# Patient Record
Sex: MALE | Race: OTHER | Employment: OTHER | URBAN - METROPOLITAN AREA
[De-identification: names, ages, dates, MRNs, and addresses within clinical notes are randomized per-mention and may not be internally consistent; named-entity substitution may affect disease eponyms.]

---

## 2024-02-25 ENCOUNTER — LAB REQUISITION (OUTPATIENT)
Dept: LAB | Facility: HOSPITAL | Age: 74
End: 2024-02-25
Payer: MEDICARE

## 2024-02-26 ENCOUNTER — TELEPHONE (OUTPATIENT)
Dept: PRIMARY CARE | Facility: CLINIC | Age: 74
End: 2024-02-26
Payer: MEDICARE

## 2024-02-26 LAB
LABORATORY COMMENT REPORT: NORMAL
PATH REPORT.GROSS SPEC: NORMAL
PATH REPORT.TOTAL CANCER: NORMAL

## 2024-02-26 NOTE — TELEPHONE ENCOUNTER
Patient discharged on 2/24/24. Patient was discharged with all medications and does not need immediate attention of the attending. Patient is scheduled for 2/27/24 for hospital follow up.

## 2024-02-27 ENCOUNTER — OFFICE VISIT (OUTPATIENT)
Dept: PRIMARY CARE | Facility: CLINIC | Age: 74
End: 2024-02-27
Payer: MEDICARE

## 2024-02-27 VITALS
SYSTOLIC BLOOD PRESSURE: 130 MMHG | WEIGHT: 150 LBS | HEART RATE: 85 BPM | HEIGHT: 66 IN | DIASTOLIC BLOOD PRESSURE: 62 MMHG | BODY MASS INDEX: 24.11 KG/M2 | OXYGEN SATURATION: 99 %

## 2024-02-27 DIAGNOSIS — Z79.4 TYPE 2 DIABETES MELLITUS WITH CHRONIC KIDNEY DISEASE, WITH LONG-TERM CURRENT USE OF INSULIN, UNSPECIFIED CKD STAGE (MULTI): ICD-10-CM

## 2024-02-27 DIAGNOSIS — Z76.89 ENCOUNTER FOR SUPPORT AND COORDINATION OF TRANSITION OF CARE: Primary | ICD-10-CM

## 2024-02-27 DIAGNOSIS — I50.42 CHRONIC COMBINED SYSTOLIC AND DIASTOLIC CONGESTIVE HEART FAILURE (MULTI): ICD-10-CM

## 2024-02-27 DIAGNOSIS — E11.22 TYPE 2 DIABETES MELLITUS WITH CHRONIC KIDNEY DISEASE, WITH LONG-TERM CURRENT USE OF INSULIN, UNSPECIFIED CKD STAGE (MULTI): ICD-10-CM

## 2024-02-27 DIAGNOSIS — G47.00 INSOMNIA, UNSPECIFIED TYPE: ICD-10-CM

## 2024-02-27 PROBLEM — N18.30 STAGE 3 CHRONIC KIDNEY DISEASE (MULTI): Status: ACTIVE | Noted: 2017-04-24

## 2024-02-27 PROCEDURE — 1160F RVW MEDS BY RX/DR IN RCRD: CPT | Performed by: STUDENT IN AN ORGANIZED HEALTH CARE EDUCATION/TRAINING PROGRAM

## 2024-02-27 PROCEDURE — 3075F SYST BP GE 130 - 139MM HG: CPT | Performed by: STUDENT IN AN ORGANIZED HEALTH CARE EDUCATION/TRAINING PROGRAM

## 2024-02-27 PROCEDURE — 4010F ACE/ARB THERAPY RXD/TAKEN: CPT | Performed by: STUDENT IN AN ORGANIZED HEALTH CARE EDUCATION/TRAINING PROGRAM

## 2024-02-27 PROCEDURE — 1036F TOBACCO NON-USER: CPT | Performed by: STUDENT IN AN ORGANIZED HEALTH CARE EDUCATION/TRAINING PROGRAM

## 2024-02-27 PROCEDURE — 1159F MED LIST DOCD IN RCRD: CPT | Performed by: STUDENT IN AN ORGANIZED HEALTH CARE EDUCATION/TRAINING PROGRAM

## 2024-02-27 PROCEDURE — 3078F DIAST BP <80 MM HG: CPT | Performed by: STUDENT IN AN ORGANIZED HEALTH CARE EDUCATION/TRAINING PROGRAM

## 2024-02-27 PROCEDURE — 99214 OFFICE O/P EST MOD 30 MIN: CPT | Performed by: STUDENT IN AN ORGANIZED HEALTH CARE EDUCATION/TRAINING PROGRAM

## 2024-02-27 PROCEDURE — G2211 COMPLEX E/M VISIT ADD ON: HCPCS | Performed by: STUDENT IN AN ORGANIZED HEALTH CARE EDUCATION/TRAINING PROGRAM

## 2024-02-27 RX ORDER — ASPIRIN 81 MG/1
81 TABLET ORAL DAILY
COMMUNITY
Start: 2019-09-17

## 2024-02-27 RX ORDER — METOPROLOL SUCCINATE 100 MG/1
100 TABLET, EXTENDED RELEASE ORAL DAILY
COMMUNITY

## 2024-02-27 RX ORDER — INSULIN GLARGINE 100 [IU]/ML
12 INJECTION, SOLUTION SUBCUTANEOUS EVERY MORNING
COMMUNITY
Start: 2023-09-12

## 2024-02-27 RX ORDER — SPIRONOLACTONE 25 MG/1
25 TABLET ORAL DAILY
COMMUNITY
Start: 2024-02-24

## 2024-02-27 RX ORDER — ISOSORBIDE MONONITRATE 60 MG/1
60 TABLET, EXTENDED RELEASE ORAL DAILY
COMMUNITY
Start: 2021-06-24

## 2024-02-27 RX ORDER — LOSARTAN POTASSIUM 25 MG/1
25 TABLET ORAL DAILY
COMMUNITY
End: 2024-04-04 | Stop reason: ALTCHOICE

## 2024-02-27 RX ORDER — ATORVASTATIN CALCIUM 20 MG/1
20 TABLET, FILM COATED ORAL DAILY
COMMUNITY
End: 2024-04-11 | Stop reason: SDUPTHER

## 2024-02-27 RX ORDER — INSULIN ASPART 100 [IU]/ML
INJECTION, SOLUTION INTRAVENOUS; SUBCUTANEOUS
COMMUNITY
End: 2024-02-27 | Stop reason: WASHOUT

## 2024-02-27 RX ORDER — EMPAGLIFLOZIN 10 MG/1
10 TABLET, FILM COATED ORAL DAILY
COMMUNITY
End: 2024-03-12 | Stop reason: WASHOUT

## 2024-02-27 RX ORDER — FUROSEMIDE 20 MG/1
20 TABLET ORAL DAILY
COMMUNITY
Start: 2023-08-08 | End: 2024-03-12 | Stop reason: ALTCHOICE

## 2024-02-27 RX ORDER — INSULIN LISPRO 100 [IU]/ML
15 INJECTION, SOLUTION INTRAVENOUS; SUBCUTANEOUS
Qty: 9 ML | Refills: 11 | Status: SHIPPED | OUTPATIENT
Start: 2024-02-27 | End: 2024-04-04 | Stop reason: SDUPTHER

## 2024-02-27 RX ORDER — CLOPIDOGREL BISULFATE 75 MG/1
75 TABLET ORAL DAILY
COMMUNITY
Start: 2023-11-14

## 2024-02-27 RX ORDER — PANTOPRAZOLE SODIUM 40 MG/1
40 TABLET, DELAYED RELEASE ORAL
COMMUNITY
Start: 2024-01-16

## 2024-02-27 RX ORDER — TORSEMIDE 20 MG/1
40 TABLET ORAL DAILY
COMMUNITY
End: 2024-04-11 | Stop reason: SDUPTHER

## 2024-02-27 RX ORDER — DAPAGLIFLOZIN 5 MG/1
5 TABLET, FILM COATED ORAL DAILY
COMMUNITY
End: 2024-03-12 | Stop reason: SDUPTHER

## 2024-02-27 RX ORDER — ZOLPIDEM TARTRATE 5 MG/1
10 TABLET ORAL
COMMUNITY
Start: 2023-12-07 | End: 2024-03-27 | Stop reason: ENTERED-IN-ERROR

## 2024-02-27 NOTE — PROGRESS NOTES
Subjective   Patient ID: Fredrick De La Paz is a 73 y.o. male who presents for follow up after his recent hospital admission with CHF exacerbation    Preventative Visit to be done next visit   AMW to be done later in the year  Assessment/Plan   Transition of Care  CHF exacerbation/Ischemic cardiomyopathy  DM  CAD s/p recent stenting to LCX, planned for another PCI  CKD  Insomnia    -Patient was educated regarding his medications, he needs to continue Torsemide 40 mg daily and stop lasix.  He was counseled regarding Jardiance/Farxiga, he needs to take one at a time  -Regarding his DM, he is sent on Basalglar 10 units in the morning, Switched novolog to Humalog 15 units with each meal (as his insurance covers humalog but not novolog).  -Follow up with cardiology as  soon as possible for 2nd stage of PCI.   -Regarding his insomnia, a prescription is sent for Belsomra 10 mg nightly. Pt typically on ambien but has run out of rx. Will try AVERY and see effect.     OARRS Report Reviewed and appropriate.  UDS reviewed.  I have personally reviewed the OARRS report.  I have considered the risks of abuse, dependence, addiction and diversion. I believe that it is clinically appropriate for this patient to be prescribed this medication based on documented diagnosis.     Controlled Substance Agreement:   I have printed this form and reviewed each line item with the patient and the patient has verbalized understanding.   Date of the last Controlled Substance Agreement: 2/27/24  Date of UDS To be obtained    I discussed patient's OARRS report as well as the potential concern for overdose on prescribed opioid, sleep aid, gabapentin/Lyrica, and/or benzodiazepines. I explained that Overdose Risk Scores >460, require a Narcan prescription and places the patient at risk for potential death.      Patient understands that the risk of death can occur when using opioid, benzodiazepines, gabapentin, Lyrica, sleep aids, and illegal drugs. The risk  of death especially increases when using the above medications and or illegal drugs in combination. I have reviewed with the patient and the patient is in agreement with the terms of the  Controlled Substance Agreement.      At this point in time, patient is in compliance with the above, and has no signs of dependence or addiction to the above prescribed medications.          HPI  A pleasant 73 yr old male, with PMH of DM, HTNm ischemic cardiomyopathy(EF of 35%), CKD(cr on diccharge was 2.6), insomnia, CAD s/p LCX stenting on 2/5, planned for LAD PCI(was supposed to be done last week but deferred given his hypoxia and fever. He has multiple recent admissions with pneumonia/CHF exacerbation, lastly discharged on 2/24. During his last admission thoracentesis was done.  Today: Patient endorses feeling a lot better, as he is able to sleep well, no orthopnea, lower limb swelling has resolved.  He denies nausea, vomiting, chest pain, cough, fever, but endorses occasional dizziness.  He lives by himself, he needs some assistance with ADLs, his ex wife and 18 yr old daughter occasionally help.  Its noted that patient has been takeing Lasix along with Torsemide, Jardiance along with Farxiga, Basalglar 10 units twice daily, he reports hypoglycemia episodes at night where he dropes to 65, he has CGM placed and alerts him, He has lost around 40 Lb recently.  He also reported that he has been on Ambient for insomnia, but has been taking extra pills during his recent sickness and is running out of it.  He was discharged on Jardiance, but he can't afford it    Social hx: Non smoker, denies Etoh drinking, no drugs, he med school lecturer, and is still working online part time      Denies fevers, chills, weight loss, lightheadedness, dizziness, vision changes, sore throat, runny nose, CP, SOB, cough, palpitations, n/v/d, abd pain, black/bloody stools, arthralgias, or new numbness/weakness/tingling in arms/legs/face.        Visit  "Vitals  /62   Pulse 85   Ht 1.676 m (5' 6\")   Wt 68 kg (150 lb)   SpO2 99%   BMI 24.21 kg/m²   Smoking Status Never   BSA 1.78 m²     PHYSICAL EXAM     General Appearance Awake, alert & oriented to time place and person, not in pain, not in distress   HEENT Normocephalic, normal conjunctiva & sclera, No JVD   Heart Normal heart sounds, no murmur, regular rate & rhythm.       Chest Left upper long zone expiratory wheezes, no crackles, equal breath sounds   Abdomen Soft, no tenderness, no guarding, no rigidity       Extremities No swelling, no pitting edema, no asymmetry, no tenderness   Skin No rash.   Neurology No focal neurological deficits     REVIEW OF SYSTEMS     ROS in HPI   No Known Allergies    Current Outpatient Medications   Medication Sig Dispense Refill    aspirin 81 mg EC tablet Take 1 tablet (81 mg) by mouth once daily.      atorvastatin (Lipitor) 20 mg tablet Take 1 tablet (20 mg) by mouth once daily.      Basaglar KwikPen U-100 Insulin 100 unit/mL (3 mL) pen Inject 10 Units under the skin once daily in the morning.      clopidogrel (Plavix) 75 mg tablet Take 1 tablet (75 mg) by mouth once daily.      dapagliflozin propanediol (Farxiga) 5 mg Take 1 tablet (5 mg) by mouth once daily.      furosemide (Lasix) 20 mg tablet Take 1 tablet (20 mg) by mouth once daily.      isosorbide mononitrate ER (Imdur) 60 mg 24 hr tablet Take 1 tablet (60 mg) by mouth once daily.      Jardiance 10 mg Take 1 tablet (10 mg) by mouth once daily.      losartan (Cozaar) 25 mg tablet Take 1 tablet (25 mg) by mouth once daily.      metoprolol succinate XL (Toprol-XL) 100 mg 24 hr tablet Take 1 tablet (100 mg) by mouth once daily.      pantoprazole (ProtoNix) 40 mg EC tablet Take 1 tablet (40 mg) by mouth once daily in the morning. Take before meals.      spironolactone (Aldactone) 25 mg tablet 1 tablet (25 mg).      torsemide (Demadex) 20 mg tablet Take 1 tablet (20 mg) by mouth 2 times a day.      zolpidem (Ambien) 5 mg " tablet Take 2 tablets (10 mg) by mouth.      insulin lispro (HumaLOG U-100 Insulin) 100 unit/mL injection Inject 0.15 mL (15 Units) under the skin 2 times a day with meals. Take as directed per insulin instructions. 9 mL 11    suvorexant (Belsomra) 10 mg tablet Take 1 tablet (10 mg) by mouth as needed at bedtime for sleep for up to 14 days. 14 tablet 0     No current facility-administered medications for this visit.       Objective     Lab Requisition on 02/22/2024   Component Date Value Ref Range Status    Case Report 02/22/2024    Final                    Value:Medical Cytology Report                           Case: [id number]                                  Authorizing Provider:  Maxwell Cunningham, Collected:           02/22/2024 1200                                     MD                                                                           Ordering Location:     [facility]       Received:            02/25/2024 1852                                     Center                                                                       Specimen:    PLEURAL FLUID RIGHT SIDE                                                                   Gross Description 02/22/2024    Final                    Value:This result contains rich text formatting which cannot be displayed here.    Specimen Processing Detail 02/22/2024    Final                    Value:This result contains rich text formatting which cannot be displayed here.       Radiology: Reviewed imaging in powerchart.  No results found.    No family history on file.  Social History     Socioeconomic History    Marital status:      Spouse name: None    Number of children: None    Years of education: None    Highest education level: None   Occupational History    None   Tobacco Use    Smoking status: Never    Smokeless tobacco: Never   Substance and Sexual Activity    Alcohol use: Not Currently    Drug use: None    Sexual activity: None   Other  Topics Concern    None   Social History Narrative    None     Social Determinants of Health     Financial Resource Strain: Not on file   Food Insecurity: Not on file   Transportation Needs: Not on file   Physical Activity: Not on file   Stress: Not on file   Social Connections: Not on file   Intimate Partner Violence: Not on file   Housing Stability: Not on file     Past Medical History:   Diagnosis Date    Acute cystitis with hematuria 12/31/2016    Acute cystitis with hematuria    Personal history of other diseases of the circulatory system     History of hypertension    Personal history of other diseases of the circulatory system     History of cardiac disorder    Personal history of other endocrine, nutritional and metabolic disease     History of diabetic neuropathy    Personal history of other endocrine, nutritional and metabolic disease     History of hyperlipidemia    Personal history of other endocrine, nutritional and metabolic disease     History of type 2 diabetes mellitus    Personal history of other specified conditions     History of insomnia     Past Surgical History:   Procedure Laterality Date    OTHER SURGICAL HISTORY  09/17/2019    Bypass    OTHER SURGICAL HISTORY  09/17/2019    Arterial stent placement       Charting was completed using voice recognition technology and may include unintended errors.

## 2024-02-27 NOTE — PROGRESS NOTES
"Subjective   Patient ID: Fredrick De La Paz is a 73 y.o. male who presents for the following    Assessment/Plan       HPI      Visit Vitals  /62   Pulse 85   Ht 1.676 m (5' 6\")   Wt 68 kg (150 lb)   SpO2 99%   BMI 24.21 kg/m²   Smoking Status Never   BSA 1.78 m²     PHYSICAL EXAM       REVIEW OF SYSTEMS       No Known Allergies    Current Outpatient Medications   Medication Sig Dispense Refill   • aspirin 81 mg EC tablet Take 1 tablet (81 mg) by mouth once daily.     • atorvastatin (Lipitor) 20 mg tablet Take 1 tablet (20 mg) by mouth once daily.     • Basaglar KwikPen U-100 Insulin 100 unit/mL (3 mL) pen Inject 15 Units under the skin once daily in the morning.     • clopidogrel (Plavix) 75 mg tablet Take 1 tablet (75 mg) by mouth once daily.     • dapagliflozin propanediol (Farxiga) 5 mg Take 1 tablet (5 mg) by mouth once daily.     • furosemide (Lasix) 20 mg tablet Take 1 tablet (20 mg) by mouth once daily.     • isosorbide mononitrate ER (Imdur) 60 mg 24 hr tablet Take 1 tablet (60 mg) by mouth once daily.     • Jardiance 10 mg Take 1 tablet (10 mg) by mouth once daily.     • losartan (Cozaar) 25 mg tablet Take 1 tablet (25 mg) by mouth once daily.     • metoprolol succinate XL (Toprol-XL) 100 mg 24 hr tablet Take 1 tablet (100 mg) by mouth once daily.     • pantoprazole (ProtoNix) 40 mg EC tablet Take 1 tablet (40 mg) by mouth once daily in the morning. Take before meals.     • spironolactone (Aldactone) 25 mg tablet 1 tablet (25 mg).     • torsemide (Demadex) 20 mg tablet Take 1 tablet (20 mg) by mouth 2 times a day.     • zolpidem (Ambien) 5 mg tablet Take 2 tablets (10 mg) by mouth.       No current facility-administered medications for this visit.       Objective     Lab Requisition on 02/22/2024   Component Date Value Ref Range Status   • Case Report 02/22/2024    Final                    Value:Medical Cytology Report                           Case: [id number]                                  Authorizing " Provider:  Maxwell Cunningham, Collected:           02/22/2024 1200                                     MD                                                                           Ordering Location:     [facility]       Received:            02/25/2024 1852                                     Center                                                                       Specimen:    PLEURAL FLUID RIGHT SIDE                                                                  • Gross Description 02/22/2024    Final                    Value:This result contains rich text formatting which cannot be displayed here.   • Specimen Processing Detail 02/22/2024    Final                    Value:This result contains rich text formatting which cannot be displayed here.       Radiology: Reviewed imaging in powerchart.  No results found.    No family history on file.  Social History     Socioeconomic History   • Marital status:      Spouse name: None   • Number of children: None   • Years of education: None   • Highest education level: None   Occupational History   • None   Tobacco Use   • Smoking status: Never   • Smokeless tobacco: Never   Substance and Sexual Activity   • Alcohol use: Not Currently   • Drug use: None   • Sexual activity: None   Other Topics Concern   • None   Social History Narrative   • None     Social Determinants of Health     Financial Resource Strain: Not on file   Food Insecurity: Not on file   Transportation Needs: Not on file   Physical Activity: Not on file   Stress: Not on file   Social Connections: Not on file   Intimate Partner Violence: Not on file   Housing Stability: Not on file     Past Medical History:   Diagnosis Date   • Acute cystitis with hematuria 12/31/2016    Acute cystitis with hematuria   • Personal history of other diseases of the circulatory system     History of hypertension   • Personal history of other diseases of the circulatory system     History of cardiac  disorder   • Personal history of other endocrine, nutritional and metabolic disease     History of diabetic neuropathy   • Personal history of other endocrine, nutritional and metabolic disease     History of hyperlipidemia   • Personal history of other endocrine, nutritional and metabolic disease     History of type 2 diabetes mellitus   • Personal history of other specified conditions     History of insomnia     Past Surgical History:   Procedure Laterality Date   • OTHER SURGICAL HISTORY  09/17/2019    Bypass   • OTHER SURGICAL HISTORY  09/17/2019    Arterial stent placement       Charting was completed using voice recognition technology and may include unintended errors.

## 2024-03-07 ENCOUNTER — TELEPHONE (OUTPATIENT)
Dept: PRIMARY CARE | Facility: CLINIC | Age: 74
End: 2024-03-07
Payer: MEDICARE

## 2024-03-07 NOTE — TELEPHONE ENCOUNTER
PATIENT CALLED AND WANTS  TO KNOW THAT HIS PULSE OX DROPPED TO 77 LAST NIGHT  HE SAID HE USUALLY RUNS AT 88

## 2024-03-07 NOTE — TELEPHONE ENCOUNTER
Talked to pt and his oxygen came back and is at 95. Told him if it doesn't come back up and stays in the 70's to go to the er

## 2024-03-12 ENCOUNTER — OFFICE VISIT (OUTPATIENT)
Dept: PRIMARY CARE | Facility: CLINIC | Age: 74
End: 2024-03-12
Payer: MEDICARE

## 2024-03-12 VITALS
HEART RATE: 95 BPM | DIASTOLIC BLOOD PRESSURE: 82 MMHG | SYSTOLIC BLOOD PRESSURE: 154 MMHG | HEIGHT: 66 IN | BODY MASS INDEX: 24.11 KG/M2 | WEIGHT: 150 LBS | OXYGEN SATURATION: 99 %

## 2024-03-12 DIAGNOSIS — Z00.00 MEDICARE ANNUAL WELLNESS VISIT, SUBSEQUENT: Primary | ICD-10-CM

## 2024-03-12 DIAGNOSIS — I25.810 ATHEROSCLEROSIS OF CORONARY ARTERY BYPASS GRAFT OF NATIVE HEART WITHOUT ANGINA PECTORIS: ICD-10-CM

## 2024-03-12 DIAGNOSIS — E11.22 TYPE 2 DIABETES MELLITUS WITH CHRONIC KIDNEY DISEASE, WITH LONG-TERM CURRENT USE OF INSULIN, UNSPECIFIED CKD STAGE (MULTI): ICD-10-CM

## 2024-03-12 DIAGNOSIS — N18.30 STAGE 3 CHRONIC KIDNEY DISEASE, UNSPECIFIED WHETHER STAGE 3A OR 3B CKD (MULTI): ICD-10-CM

## 2024-03-12 DIAGNOSIS — I50.42 CHRONIC COMBINED SYSTOLIC AND DIASTOLIC CONGESTIVE HEART FAILURE (MULTI): ICD-10-CM

## 2024-03-12 DIAGNOSIS — Z79.4 TYPE 2 DIABETES MELLITUS WITH CHRONIC KIDNEY DISEASE, WITH LONG-TERM CURRENT USE OF INSULIN, UNSPECIFIED CKD STAGE (MULTI): ICD-10-CM

## 2024-03-12 PROCEDURE — 1170F FXNL STATUS ASSESSED: CPT | Performed by: STUDENT IN AN ORGANIZED HEALTH CARE EDUCATION/TRAINING PROGRAM

## 2024-03-12 PROCEDURE — 3077F SYST BP >= 140 MM HG: CPT | Performed by: STUDENT IN AN ORGANIZED HEALTH CARE EDUCATION/TRAINING PROGRAM

## 2024-03-12 PROCEDURE — G0439 PPPS, SUBSEQ VISIT: HCPCS | Performed by: STUDENT IN AN ORGANIZED HEALTH CARE EDUCATION/TRAINING PROGRAM

## 2024-03-12 PROCEDURE — 4010F ACE/ARB THERAPY RXD/TAKEN: CPT | Performed by: STUDENT IN AN ORGANIZED HEALTH CARE EDUCATION/TRAINING PROGRAM

## 2024-03-12 PROCEDURE — 99213 OFFICE O/P EST LOW 20 MIN: CPT | Performed by: STUDENT IN AN ORGANIZED HEALTH CARE EDUCATION/TRAINING PROGRAM

## 2024-03-12 PROCEDURE — 1159F MED LIST DOCD IN RCRD: CPT | Performed by: STUDENT IN AN ORGANIZED HEALTH CARE EDUCATION/TRAINING PROGRAM

## 2024-03-12 PROCEDURE — 3079F DIAST BP 80-89 MM HG: CPT | Performed by: STUDENT IN AN ORGANIZED HEALTH CARE EDUCATION/TRAINING PROGRAM

## 2024-03-12 PROCEDURE — G0442 ANNUAL ALCOHOL SCREEN 15 MIN: HCPCS | Performed by: STUDENT IN AN ORGANIZED HEALTH CARE EDUCATION/TRAINING PROGRAM

## 2024-03-12 PROCEDURE — 1036F TOBACCO NON-USER: CPT | Performed by: STUDENT IN AN ORGANIZED HEALTH CARE EDUCATION/TRAINING PROGRAM

## 2024-03-12 PROCEDURE — 1160F RVW MEDS BY RX/DR IN RCRD: CPT | Performed by: STUDENT IN AN ORGANIZED HEALTH CARE EDUCATION/TRAINING PROGRAM

## 2024-03-12 RX ORDER — AMLODIPINE BESYLATE 10 MG/1
10 TABLET ORAL DAILY
COMMUNITY
Start: 2023-12-07 | End: 2024-03-27 | Stop reason: ENTERED-IN-ERROR

## 2024-03-12 RX ORDER — BISMUTH SUBSALICYLATE 262 MG
1 TABLET,CHEWABLE ORAL DAILY
COMMUNITY

## 2024-03-12 ASSESSMENT — PATIENT HEALTH QUESTIONNAIRE - PHQ9
1. LITTLE INTEREST OR PLEASURE IN DOING THINGS: NOT AT ALL
SUM OF ALL RESPONSES TO PHQ9 QUESTIONS 1 AND 2: 0
2. FEELING DOWN, DEPRESSED OR HOPELESS: NOT AT ALL

## 2024-03-12 ASSESSMENT — ENCOUNTER SYMPTOMS
OCCASIONAL FEELINGS OF UNSTEADINESS: 0
DEPRESSION: 0
LOSS OF SENSATION IN FEET: 0

## 2024-03-12 ASSESSMENT — ACTIVITIES OF DAILY LIVING (ADL)
DRESSING: INDEPENDENT
GROCERY_SHOPPING: INDEPENDENT
DOING_HOUSEWORK: INDEPENDENT
BATHING: INDEPENDENT
MANAGING_FINANCES: INDEPENDENT
TAKING_MEDICATION: INDEPENDENT

## 2024-03-12 NOTE — PROGRESS NOTES
Subjective   Patient ID: Fredrick De La Paz is a 73 y.o. male who presents for follow up after his recent hospital admission with CHF exacerbation    AMW: 3/12/24; see chart   Annual Physical to be done in 3 months     Assessment/Plan   Preventative Medicine  -UTD on vaccines   -Had colonoscopy done recently per patient. Results to be brought  -PSA checked by previous PCP. States that it was normal but has a hx of BPH.   -Does not want labs today as he recently had labs done by his cardiology team.   -PHQ2: 0  -Alcohol screening done    CHF  CAD  T2DM  CKD-III  Insomnia   HTN  -BP improving. Has not taken BP meds yet today.   -PT still taking both jardiance and farxiga. Further stressed to only take Jardiance (Cheaper than farxiga). Patient is agreeable and will stop.   -Has cardiac cath scheduled this week  -Still has intermittent SOB and hypoxia which resolves with rest.   -Patient was educated regarding his medications, he needs to continue Torsemide 40 mg daily  -Regarding his DM, he is on Basalglar 10 units in the morning, Humalog 15 units with each meal (as his insurance covers humalog but not novolog).  -Ambien refilled by outside provider on 3/10. Belsomra discontinued.      OARRS Report Reviewed and appropriate.  UDS reviewed.  I have personally reviewed the OARRS report.  I have considered the risks of abuse, dependence, addiction and diversion. I believe that it is clinically appropriate for this patient to be prescribed this medication based on documented diagnosis.     Controlled Substance Agreement:   I have printed this form and reviewed each line item with the patient and the patient has verbalized understanding.   Date of the last Controlled Substance Agreement: 2/27/24  Date of UDS To be obtained    I discussed patient's OARRS report as well as the potential concern for overdose on prescribed opioid, sleep aid, gabapentin/Lyrica, and/or benzodiazepines. I explained that Overdose Risk Scores >460,  "require a Narcan prescription and places the patient at risk for potential death.      Patient understands that the risk of death can occur when using opioid, benzodiazepines, gabapentin, Lyrica, sleep aids, and illegal drugs. The risk of death especially increases when using the above medications and or illegal drugs in combination. I have reviewed with the patient and the patient is in agreement with the terms of the  Controlled Substance Agreement.      At this point in time, patient is in compliance with the above, and has no signs of dependence or addiction to the above prescribed medications.          HPI  73M presents for follow up visit.     PMH of DM, HTNm ischemic cardiomyopathy(EF of 35%), CKD(cr on discharge was 2.6), insomnia, CAD s/p LCX stenting on 2/5, planned for LAD PCI(was supposed to be done last week but deferred given his hypoxia and fever. He has multiple recent admissions with pneumonia/CHF exacerbation, lastly discharged on 2/24. During his last admission thoracentesis was done.    Currently doing well. SOB is minimal but he still has exacerbations with associated hypoxia into the 80s that corrects quickly. He is scheduled for cardiac cath this week and is hoping this will help with symptoms. Denies any CP, dizziness, syncope.     Counseling given in regards to medications. Patient continues to take jardiance and farxiga. Advised to stop one. He is agreeable.     No other complaints at this time.     Social hx: Non smoker, denies Etoh drinking, no drugs, he med school lecturer, and is still working online part time      Denies fevers, chills, weight loss, lightheadedness, dizziness, vision changes, sore throat, runny nose, CP, cough, palpitations, n/v/d, abd pain, black/bloody stools, arthralgias, or new numbness/weakness/tingling in arms/legs/face.        Visit Vitals  /82   Pulse 95   Ht 1.676 m (5' 6\")   Wt 68 kg (150 lb)   SpO2 99%   BMI 24.21 kg/m²   Smoking Status Never   BSA " 1.78 m²     PHYSICAL EXAM     General Appearance Awake, alert & oriented to time place and person, not in pain, not in distress   HEENT Normocephalic, normal conjunctiva & sclera, No JVD   Heart Normal heart sounds, no murmur, regular rate & rhythm.       Chest Left upper long zone expiratory wheezes, no crackles, equal breath sounds   Abdomen Soft, no tenderness, no guarding, no rigidity       Extremities No swelling, no pitting edema, no asymmetry, no tenderness   Skin No rash.   Neurology No focal neurological deficits     REVIEW OF SYSTEMS     ROS in HPI   No Known Allergies    Current Outpatient Medications   Medication Sig Dispense Refill    amLODIPine (Norvasc) 10 mg tablet Take 1 tablet (10 mg) by mouth once daily.      aspirin 81 mg EC tablet Take 1 tablet (81 mg) by mouth once daily.      atorvastatin (Lipitor) 20 mg tablet Take 1 tablet (20 mg) by mouth once daily.      Basaglar KwikPen U-100 Insulin 100 unit/mL (3 mL) pen Inject 10 Units under the skin once daily in the morning.      clopidogrel (Plavix) 75 mg tablet Take 1 tablet (75 mg) by mouth once daily.      dapagliflozin propanediol (Farxiga) 5 mg Take 1 tablet (5 mg) by mouth once daily.      insulin lispro (HumaLOG U-100 Insulin) 100 unit/mL injection Inject 0.15 mL (15 Units) under the skin 2 times a day with meals. Take as directed per insulin instructions. 9 mL 11    isosorbide mononitrate ER (Imdur) 60 mg 24 hr tablet Take 1 tablet (60 mg) by mouth once daily.      Jardiance 10 mg Take 1 tablet (10 mg) by mouth once daily.      losartan (Cozaar) 25 mg tablet Take 1 tablet (25 mg) by mouth once daily.      metoprolol succinate XL (Toprol-XL) 100 mg 24 hr tablet Take 1 tablet (100 mg) by mouth once daily.      multivitamin tablet Take 1 tablet by mouth once daily.      pantoprazole (ProtoNix) 40 mg EC tablet Take 1 tablet (40 mg) by mouth once daily in the morning. Take before meals.      spironolactone (Aldactone) 25 mg tablet 1 tablet (25  mg).      torsemide (Demadex) 20 mg tablet Take 1 tablet (20 mg) by mouth 2 times a day.      furosemide (Lasix) 20 mg tablet Take 1 tablet (20 mg) by mouth once daily.      suvorexant (Belsomra) 10 mg tablet Take 1 tablet (10 mg) by mouth as needed at bedtime for sleep for up to 14 days. 14 tablet 0    zolpidem (Ambien) 5 mg tablet Take 2 tablets (10 mg) by mouth.       No current facility-administered medications for this visit.       Objective     Lab Requisition on 02/22/2024   Component Date Value Ref Range Status    Case Report 02/22/2024    Final                    Value:Medical Cytology Report                           Case: [id number]                                  Authorizing Provider:  Maxwell Cunningham, Collected:           02/22/2024 1200                                     MD                                                                           Ordering Location:     [facility]       Received:            02/25/2024 1852                                     Center                                                                       Specimen:    PLEURAL FLUID RIGHT SIDE                                                                   Gross Description 02/22/2024    Final                    Value:This result contains rich text formatting which cannot be displayed here.    Specimen Processing Detail 02/22/2024    Final                    Value:This result contains rich text formatting which cannot be displayed here.       Radiology: Reviewed imaging in powerchart.  No results found.    No family history on file.  Social History     Socioeconomic History    Marital status:      Spouse name: None    Number of children: None    Years of education: None    Highest education level: None   Occupational History    None   Tobacco Use    Smoking status: Never    Smokeless tobacco: Never   Substance and Sexual Activity    Alcohol use: Not Currently    Drug use: None    Sexual  activity: None   Other Topics Concern    None   Social History Narrative    None     Social Determinants of Health     Financial Resource Strain: Not on file   Food Insecurity: Not on file   Transportation Needs: Not on file   Physical Activity: Not on file   Stress: Not on file   Social Connections: Not on file   Intimate Partner Violence: Not on file   Housing Stability: Not on file     Past Medical History:   Diagnosis Date    Acute cystitis with hematuria 12/31/2016    Acute cystitis with hematuria    Personal history of other diseases of the circulatory system     History of hypertension    Personal history of other diseases of the circulatory system     History of cardiac disorder    Personal history of other endocrine, nutritional and metabolic disease     History of diabetic neuropathy    Personal history of other endocrine, nutritional and metabolic disease     History of hyperlipidemia    Personal history of other endocrine, nutritional and metabolic disease     History of type 2 diabetes mellitus    Personal history of other specified conditions     History of insomnia     Past Surgical History:   Procedure Laterality Date    OTHER SURGICAL HISTORY  09/17/2019    Bypass    OTHER SURGICAL HISTORY  09/17/2019    Arterial stent placement       Charting was completed using voice recognition technology and may include unintended errors.

## 2024-03-20 ENCOUNTER — OFFICE VISIT (OUTPATIENT)
Dept: URGENT CARE | Facility: CLINIC | Age: 74
End: 2024-03-20
Payer: MEDICARE

## 2024-03-20 VITALS
DIASTOLIC BLOOD PRESSURE: 70 MMHG | OXYGEN SATURATION: 96 % | TEMPERATURE: 98.2 F | SYSTOLIC BLOOD PRESSURE: 115 MMHG | HEART RATE: 85 BPM | RESPIRATION RATE: 16 BRPM

## 2024-03-20 DIAGNOSIS — J06.9 VIRAL URI WITH COUGH: Primary | ICD-10-CM

## 2024-03-20 DIAGNOSIS — R50.9 FEVER, UNSPECIFIED FEVER CAUSE: ICD-10-CM

## 2024-03-20 LAB
POC TRIPLEX FLU A-AG: NORMAL
POC TRIPLEX FLU B-AG: NORMAL
POC TRIPLEX SARSCOV-2 AG: NORMAL

## 2024-03-20 PROCEDURE — 1160F RVW MEDS BY RX/DR IN RCRD: CPT | Performed by: PHYSICIAN ASSISTANT

## 2024-03-20 PROCEDURE — 4010F ACE/ARB THERAPY RXD/TAKEN: CPT | Performed by: PHYSICIAN ASSISTANT

## 2024-03-20 PROCEDURE — 1126F AMNT PAIN NOTED NONE PRSNT: CPT | Performed by: PHYSICIAN ASSISTANT

## 2024-03-20 PROCEDURE — 99213 OFFICE O/P EST LOW 20 MIN: CPT | Performed by: PHYSICIAN ASSISTANT

## 2024-03-20 PROCEDURE — 3078F DIAST BP <80 MM HG: CPT | Performed by: PHYSICIAN ASSISTANT

## 2024-03-20 PROCEDURE — 1159F MED LIST DOCD IN RCRD: CPT | Performed by: PHYSICIAN ASSISTANT

## 2024-03-20 PROCEDURE — 3074F SYST BP LT 130 MM HG: CPT | Performed by: PHYSICIAN ASSISTANT

## 2024-03-20 PROCEDURE — 87428 SARSCOV & INF VIR A&B AG IA: CPT | Performed by: PHYSICIAN ASSISTANT

## 2024-03-20 PROCEDURE — 1036F TOBACCO NON-USER: CPT | Performed by: PHYSICIAN ASSISTANT

## 2024-03-20 ASSESSMENT — ENCOUNTER SYMPTOMS
NEUROLOGICAL NEGATIVE: 1
ENDOCRINE NEGATIVE: 1
SHORTNESS OF BREATH: 1
HEMATOLOGIC/LYMPHATIC NEGATIVE: 1
PSYCHIATRIC NEGATIVE: 1
FEVER: 1
ALLERGIC/IMMUNOLOGIC NEGATIVE: 1
EYES NEGATIVE: 1
CARDIOVASCULAR NEGATIVE: 1
MYALGIAS: 1
SORE THROAT: 0
GASTROINTESTINAL NEGATIVE: 1
COUGH: 1

## 2024-03-20 ASSESSMENT — PAIN SCALES - GENERAL: PAINLEVEL: 0-NO PAIN

## 2024-03-20 NOTE — PROGRESS NOTES
Subjective   Patient ID: Fredrick De La Paz is a 73 y.o. male.      History provided by:  Patient   used: No    Fever   Associated symptoms include coughing. Pertinent negatives include no congestion, ear pain or sore throat.   Cough  Associated symptoms include a fever, myalgias and shortness of breath. Pertinent negatives include no ear pain or sore throat.   This is a 73 yr old male here for dry cough, myalgias, feeling feverish and dyspnea x 1 day.No URI sxs, sore throat or ear pain. COVID exposure.    Review of Systems   Constitutional:  Positive for fever.   HENT:  Negative for congestion, ear pain and sore throat.    Eyes: Negative.    Respiratory:  Positive for cough and shortness of breath.    Cardiovascular: Negative.    Gastrointestinal: Negative.    Endocrine: Negative.    Genitourinary: Negative.    Musculoskeletal:  Positive for myalgias.   Allergic/Immunologic: Negative.    Neurological: Negative.    Hematological: Negative.    Psychiatric/Behavioral: Negative.     All other systems reviewed and are negative.  /70 (BP Location: Left arm, Patient Position: Sitting, BP Cuff Size: Large adult)   Pulse 85   Temp 36.8 °C (98.2 °F) (Temporal)   Resp 16   SpO2 96%     Objective   Physical Exam  Vitals and nursing note reviewed.   Constitutional:       Appearance: Normal appearance.   HENT:      Head: Normocephalic and atraumatic.      Right Ear: Tympanic membrane and ear canal normal.      Left Ear: Tympanic membrane and ear canal normal.      Mouth/Throat:      Mouth: Mucous membranes are moist.      Pharynx: Oropharynx is clear.   Cardiovascular:      Rate and Rhythm: Normal rate and regular rhythm.   Pulmonary:      Effort: Pulmonary effort is normal.      Breath sounds: Normal breath sounds.   Musculoskeletal:      Cervical back: Neck supple.   Lymphadenopathy:      Cervical: No cervical adenopathy.   Skin:     General: Skin is warm and dry.   Neurological:      General: No  focal deficit present.      Mental Status: He is alert and oriented to person, place, and time.   Psychiatric:         Mood and Affect: Mood normal.         Behavior: Behavior normal.     Rapid COVID/flu-both negative    Assessment:  Viral URI with cough    Plan:  Sx tx, OTC meds as directed  Pcp follow up this week if not improving or worsening  ER visit anytime 24/7 for acute worsening or changing condition

## 2024-03-20 NOTE — PATIENT INSTRUCTIONS
OTC meds as directed for cough or fever  Fluids and rest  Pcp follow up this week if not improving or worsening  ER visit anytime 24/7 for acute worsening or changing condition

## 2024-03-26 ENCOUNTER — TELEPHONE (OUTPATIENT)
Dept: PRIMARY CARE | Facility: CLINIC | Age: 74
End: 2024-03-26
Payer: MEDICARE

## 2024-03-26 DIAGNOSIS — G47.00 INSOMNIA, UNSPECIFIED TYPE: Primary | ICD-10-CM

## 2024-03-26 RX ORDER — TRAZODONE HYDROCHLORIDE 50 MG/1
50 TABLET ORAL NIGHTLY PRN
Qty: 7 TABLET | Refills: 0 | Status: SHIPPED | OUTPATIENT
Start: 2024-03-26 | End: 2024-04-11

## 2024-03-26 NOTE — TELEPHONE ENCOUNTER
Patient called stated he cannot sleep, he stated Ambien is not helping. Patient requesting a different medication. If he needs to be seen he is aware and I will return call to schedule if needed. Please advise.

## 2024-03-26 NOTE — TELEPHONE ENCOUNTER
Spoke to pt - informed him of message. States he takes melatonin but doesn't help. Agreeable to taking trazodone and for it to be sent to pharmacy.

## 2024-03-26 NOTE — PROGRESS NOTES
Continues to have insomnia on ambien and melatonin. Will rx trazodone 50mg PO at bedtime PRN for sleep x 7 days.

## 2024-03-27 ENCOUNTER — APPOINTMENT (OUTPATIENT)
Dept: RADIOLOGY | Facility: HOSPITAL | Age: 74
DRG: 280 | End: 2024-03-27
Payer: MEDICARE

## 2024-03-27 ENCOUNTER — APPOINTMENT (OUTPATIENT)
Dept: CARDIOLOGY | Facility: HOSPITAL | Age: 74
DRG: 280 | End: 2024-03-27
Payer: MEDICARE

## 2024-03-27 ENCOUNTER — HOSPITAL ENCOUNTER (INPATIENT)
Facility: HOSPITAL | Age: 74
LOS: 3 days | Discharge: HOME | DRG: 280 | End: 2024-03-30
Attending: INTERNAL MEDICINE | Admitting: PHYSICIAN ASSISTANT
Payer: MEDICARE

## 2024-03-27 DIAGNOSIS — I21.4 NSTEMI (NON-ST ELEVATED MYOCARDIAL INFARCTION) (MULTI): ICD-10-CM

## 2024-03-27 DIAGNOSIS — R06.02 SHORTNESS OF BREATH: ICD-10-CM

## 2024-03-27 DIAGNOSIS — R79.89 ELEVATED TROPONIN: ICD-10-CM

## 2024-03-27 DIAGNOSIS — I50.9 ACUTE CONGESTIVE HEART FAILURE, UNSPECIFIED HEART FAILURE TYPE (MULTI): Primary | ICD-10-CM

## 2024-03-27 DIAGNOSIS — R09.02 HYPOXIA: ICD-10-CM

## 2024-03-27 LAB
ABO GROUP (TYPE) IN BLOOD: NORMAL
ALBUMIN SERPL BCP-MCNC: 4.2 G/DL (ref 3.4–5)
ALP SERPL-CCNC: 58 U/L (ref 33–136)
ALT SERPL W P-5'-P-CCNC: 14 U/L (ref 10–52)
ANION GAP SERPL CALC-SCNC: 18 MMOL/L (ref 10–20)
ANTIBODY SCREEN: NORMAL
AORTIC VALVE MEAN GRADIENT: 4 MMHG
AORTIC VALVE PEAK VELOCITY: 1.25 M/S
APPEARANCE UR: CLEAR
APTT PPP: 28 SECONDS (ref 27–38)
AST SERPL W P-5'-P-CCNC: 19 U/L (ref 9–39)
AV PEAK GRADIENT: 6.3 MMHG
AVA (PEAK VEL): 2 CM2
AVA (VTI): 2.11 CM2
BACTERIA #/AREA URNS AUTO: ABNORMAL /HPF
BASOPHILS # BLD AUTO: 0.07 X10*3/UL (ref 0–0.1)
BASOPHILS NFR BLD AUTO: 0.7 %
BILIRUB SERPL-MCNC: 0.7 MG/DL (ref 0–1.2)
BILIRUB UR STRIP.AUTO-MCNC: NEGATIVE MG/DL
BNP SERPL-MCNC: 2456 PG/ML (ref 0–99)
BUN SERPL-MCNC: 36 MG/DL (ref 6–23)
CALCIUM SERPL-MCNC: 9.7 MG/DL (ref 8.6–10.3)
CARDIAC TROPONIN I PNL SERPL HS: 1075 NG/L (ref 0–20)
CARDIAC TROPONIN I PNL SERPL HS: 134 NG/L (ref 0–20)
CARDIAC TROPONIN I PNL SERPL HS: 44 NG/L (ref 0–20)
CHLORIDE SERPL-SCNC: 103 MMOL/L (ref 98–107)
CO2 SERPL-SCNC: 21 MMOL/L (ref 21–32)
COLOR UR: ABNORMAL
CREAT SERPL-MCNC: 2.61 MG/DL (ref 0.5–1.3)
EGFRCR SERPLBLD CKD-EPI 2021: 25 ML/MIN/1.73M*2
EJECTION FRACTION APICAL 4 CHAMBER: 36
EJECTION FRACTION: 34 %
EOSINOPHIL # BLD AUTO: 0.2 X10*3/UL (ref 0–0.4)
EOSINOPHIL NFR BLD AUTO: 2 %
ERYTHROCYTE [DISTWIDTH] IN BLOOD BY AUTOMATED COUNT: 16.6 % (ref 11.5–14.5)
GLUCOSE BLD MANUAL STRIP-MCNC: 169 MG/DL (ref 74–99)
GLUCOSE BLD MANUAL STRIP-MCNC: 173 MG/DL (ref 74–99)
GLUCOSE BLD MANUAL STRIP-MCNC: 184 MG/DL (ref 74–99)
GLUCOSE SERPL-MCNC: 262 MG/DL (ref 74–99)
GLUCOSE UR STRIP.AUTO-MCNC: ABNORMAL MG/DL
HCT VFR BLD AUTO: 40.7 % (ref 41–52)
HGB BLD-MCNC: 12.7 G/DL (ref 13.5–17.5)
HYALINE CASTS #/AREA URNS AUTO: ABNORMAL /LPF
IMM GRANULOCYTES # BLD AUTO: 0.03 X10*3/UL (ref 0–0.5)
IMM GRANULOCYTES NFR BLD AUTO: 0.3 % (ref 0–0.9)
INR PPP: 1.1 (ref 0.9–1.1)
KETONES UR STRIP.AUTO-MCNC: NEGATIVE MG/DL
LEFT ATRIUM VOLUME AREA LENGTH INDEX BSA: 34.2 ML/M2
LEFT VENTRICLE INTERNAL DIMENSION DIASTOLE: 5.08 CM (ref 3.5–6)
LEFT VENTRICULAR OUTFLOW TRACT DIAMETER: 1.8 CM
LEUKOCYTE ESTERASE UR QL STRIP.AUTO: NEGATIVE
LYMPHOCYTES # BLD AUTO: 3.1 X10*3/UL (ref 0.8–3)
LYMPHOCYTES NFR BLD AUTO: 31.2 %
MAGNESIUM SERPL-MCNC: 1.81 MG/DL (ref 1.6–2.4)
MCH RBC QN AUTO: 30 PG (ref 26–34)
MCHC RBC AUTO-ENTMCNC: 31.2 G/DL (ref 32–36)
MCV RBC AUTO: 96 FL (ref 80–100)
MITRAL VALVE E/A RATIO: 1.63
MONOCYTES # BLD AUTO: 0.51 X10*3/UL (ref 0.05–0.8)
MONOCYTES NFR BLD AUTO: 5.1 %
NEUTROPHILS # BLD AUTO: 6.03 X10*3/UL (ref 1.6–5.5)
NEUTROPHILS NFR BLD AUTO: 60.7 %
NITRITE UR QL STRIP.AUTO: NEGATIVE
NRBC BLD-RTO: 0 /100 WBCS (ref 0–0)
PH UR STRIP.AUTO: 5 [PH]
PHOSPHATE SERPL-MCNC: 4.3 MG/DL (ref 2.5–4.9)
PLATELET # BLD AUTO: 246 X10*3/UL (ref 150–450)
POTASSIUM SERPL-SCNC: 4.8 MMOL/L (ref 3.5–5.3)
PROT SERPL-MCNC: 7.8 G/DL (ref 6.4–8.2)
PROT UR STRIP.AUTO-MCNC: ABNORMAL MG/DL
PROTHROMBIN TIME: 12.1 SECONDS (ref 9.8–12.8)
RBC # BLD AUTO: 4.23 X10*6/UL (ref 4.5–5.9)
RBC # UR STRIP.AUTO: ABNORMAL /UL
RBC #/AREA URNS AUTO: ABNORMAL /HPF
RH FACTOR (ANTIGEN D): NORMAL
RIGHT VENTRICLE FREE WALL PEAK S': 9.25 CM/S
RIGHT VENTRICLE PEAK SYSTOLIC PRESSURE: 41.4 MMHG
SODIUM SERPL-SCNC: 137 MMOL/L (ref 136–145)
SP GR UR STRIP.AUTO: 1.01
TRICUSPID ANNULAR PLANE SYSTOLIC EXCURSION: 1 CM
UROBILINOGEN UR STRIP.AUTO-MCNC: <2 MG/DL
WBC # BLD AUTO: 9.9 X10*3/UL (ref 4.4–11.3)
WBC #/AREA URNS AUTO: ABNORMAL /HPF

## 2024-03-27 PROCEDURE — 85025 COMPLETE CBC W/AUTO DIFF WBC: CPT | Performed by: INTERNAL MEDICINE

## 2024-03-27 PROCEDURE — 80053 COMPREHEN METABOLIC PANEL: CPT | Performed by: INTERNAL MEDICINE

## 2024-03-27 PROCEDURE — 2500000005 HC RX 250 GENERAL PHARMACY W/O HCPCS: Performed by: INTERNAL MEDICINE

## 2024-03-27 PROCEDURE — 81001 URINALYSIS AUTO W/SCOPE: CPT | Performed by: PHYSICIAN ASSISTANT

## 2024-03-27 PROCEDURE — 93306 TTE W/DOPPLER COMPLETE: CPT

## 2024-03-27 PROCEDURE — 99291 CRITICAL CARE FIRST HOUR: CPT | Performed by: INTERNAL MEDICINE

## 2024-03-27 PROCEDURE — 94660 CPAP INITIATION&MGMT: CPT

## 2024-03-27 PROCEDURE — 84484 ASSAY OF TROPONIN QUANT: CPT | Performed by: INTERNAL MEDICINE

## 2024-03-27 PROCEDURE — 2500000001 HC RX 250 WO HCPCS SELF ADMINISTERED DRUGS (ALT 637 FOR MEDICARE OP): Performed by: INTERNAL MEDICINE

## 2024-03-27 PROCEDURE — 2500000004 HC RX 250 GENERAL PHARMACY W/ HCPCS (ALT 636 FOR OP/ED): Performed by: INTERNAL MEDICINE

## 2024-03-27 PROCEDURE — 86901 BLOOD TYPING SEROLOGIC RH(D): CPT | Performed by: INTERNAL MEDICINE

## 2024-03-27 PROCEDURE — 71045 X-RAY EXAM CHEST 1 VIEW: CPT | Performed by: RADIOLOGY

## 2024-03-27 PROCEDURE — 93010 ELECTROCARDIOGRAM REPORT: CPT | Performed by: INTERNAL MEDICINE

## 2024-03-27 PROCEDURE — 93005 ELECTROCARDIOGRAM TRACING: CPT

## 2024-03-27 PROCEDURE — 82947 ASSAY GLUCOSE BLOOD QUANT: CPT

## 2024-03-27 PROCEDURE — 96374 THER/PROPH/DIAG INJ IV PUSH: CPT

## 2024-03-27 PROCEDURE — 2060000001 HC INTERMEDIATE ICU ROOM DAILY

## 2024-03-27 PROCEDURE — 2500000004 HC RX 250 GENERAL PHARMACY W/ HCPCS (ALT 636 FOR OP/ED): Performed by: PHYSICIAN ASSISTANT

## 2024-03-27 PROCEDURE — 2500000001 HC RX 250 WO HCPCS SELF ADMINISTERED DRUGS (ALT 637 FOR MEDICARE OP): Performed by: PHYSICIAN ASSISTANT

## 2024-03-27 PROCEDURE — 84100 ASSAY OF PHOSPHORUS: CPT | Performed by: INTERNAL MEDICINE

## 2024-03-27 PROCEDURE — 71045 X-RAY EXAM CHEST 1 VIEW: CPT

## 2024-03-27 PROCEDURE — 2500000002 HC RX 250 W HCPCS SELF ADMINISTERED DRUGS (ALT 637 FOR MEDICARE OP, ALT 636 FOR OP/ED): Performed by: PHYSICIAN ASSISTANT

## 2024-03-27 PROCEDURE — 84484 ASSAY OF TROPONIN QUANT: CPT | Performed by: PHYSICIAN ASSISTANT

## 2024-03-27 PROCEDURE — 83735 ASSAY OF MAGNESIUM: CPT | Performed by: INTERNAL MEDICINE

## 2024-03-27 PROCEDURE — 36415 COLL VENOUS BLD VENIPUNCTURE: CPT | Performed by: INTERNAL MEDICINE

## 2024-03-27 PROCEDURE — 85730 THROMBOPLASTIN TIME PARTIAL: CPT | Performed by: INTERNAL MEDICINE

## 2024-03-27 PROCEDURE — 5A09357 ASSISTANCE WITH RESPIRATORY VENTILATION, LESS THAN 24 CONSECUTIVE HOURS, CONTINUOUS POSITIVE AIRWAY PRESSURE: ICD-10-PCS | Performed by: INTERNAL MEDICINE

## 2024-03-27 PROCEDURE — 85610 PROTHROMBIN TIME: CPT | Performed by: INTERNAL MEDICINE

## 2024-03-27 PROCEDURE — 83880 ASSAY OF NATRIURETIC PEPTIDE: CPT | Performed by: INTERNAL MEDICINE

## 2024-03-27 PROCEDURE — 99223 1ST HOSP IP/OBS HIGH 75: CPT | Performed by: PHYSICIAN ASSISTANT

## 2024-03-27 RX ORDER — LOSARTAN POTASSIUM 25 MG/1
25 TABLET ORAL DAILY
Status: DISCONTINUED | OUTPATIENT
Start: 2024-03-27 | End: 2024-03-30 | Stop reason: HOSPADM

## 2024-03-27 RX ORDER — ISOSORBIDE MONONITRATE 60 MG/1
60 TABLET, EXTENDED RELEASE ORAL DAILY
Status: DISCONTINUED | OUTPATIENT
Start: 2024-03-27 | End: 2024-03-30 | Stop reason: HOSPADM

## 2024-03-27 RX ORDER — ATORVASTATIN CALCIUM 20 MG/1
20 TABLET, FILM COATED ORAL DAILY
Status: DISCONTINUED | OUTPATIENT
Start: 2024-03-27 | End: 2024-03-30 | Stop reason: HOSPADM

## 2024-03-27 RX ORDER — ALLOPURINOL 100 MG/1
100 TABLET ORAL DAILY
Status: DISCONTINUED | OUTPATIENT
Start: 2024-03-27 | End: 2024-03-30 | Stop reason: HOSPADM

## 2024-03-27 RX ORDER — NAPROXEN SODIUM 220 MG/1
324 TABLET, FILM COATED ORAL ONCE
Status: COMPLETED | OUTPATIENT
Start: 2024-03-27 | End: 2024-03-27

## 2024-03-27 RX ORDER — ACETAMINOPHEN 325 MG/1
650 TABLET ORAL EVERY 4 HOURS PRN
Status: DISCONTINUED | OUTPATIENT
Start: 2024-03-27 | End: 2024-03-30 | Stop reason: HOSPADM

## 2024-03-27 RX ORDER — ONDANSETRON 4 MG/1
4 TABLET, FILM COATED ORAL EVERY 6 HOURS PRN
Status: DISCONTINUED | OUTPATIENT
Start: 2024-03-27 | End: 2024-03-30 | Stop reason: HOSPADM

## 2024-03-27 RX ORDER — ZOLPIDEM TARTRATE 5 MG/1
10 TABLET ORAL NIGHTLY
Status: DISCONTINUED | OUTPATIENT
Start: 2024-03-27 | End: 2024-03-30 | Stop reason: HOSPADM

## 2024-03-27 RX ORDER — ONDANSETRON HYDROCHLORIDE 2 MG/ML
4 INJECTION, SOLUTION INTRAVENOUS EVERY 6 HOURS PRN
Status: DISCONTINUED | OUTPATIENT
Start: 2024-03-27 | End: 2024-03-30 | Stop reason: HOSPADM

## 2024-03-27 RX ORDER — ACETAMINOPHEN 160 MG/5ML
650 SOLUTION ORAL EVERY 4 HOURS PRN
Status: DISCONTINUED | OUTPATIENT
Start: 2024-03-27 | End: 2024-03-30 | Stop reason: HOSPADM

## 2024-03-27 RX ORDER — ASPIRIN 81 MG/1
81 TABLET ORAL DAILY
Status: DISCONTINUED | OUTPATIENT
Start: 2024-03-27 | End: 2024-03-27

## 2024-03-27 RX ORDER — ALLOPURINOL 100 MG/1
100 TABLET ORAL DAILY
COMMUNITY
Start: 2023-12-07

## 2024-03-27 RX ORDER — INSULIN GLARGINE 100 [IU]/ML
10 INJECTION, SOLUTION SUBCUTANEOUS EVERY MORNING
Status: DISCONTINUED | OUTPATIENT
Start: 2024-03-27 | End: 2024-03-30 | Stop reason: HOSPADM

## 2024-03-27 RX ORDER — CLOPIDOGREL BISULFATE 75 MG/1
75 TABLET ORAL DAILY
Status: DISCONTINUED | OUTPATIENT
Start: 2024-03-27 | End: 2024-03-30 | Stop reason: HOSPADM

## 2024-03-27 RX ORDER — FUROSEMIDE 10 MG/ML
40 INJECTION INTRAMUSCULAR; INTRAVENOUS ONCE
Status: COMPLETED | OUTPATIENT
Start: 2024-03-27 | End: 2024-03-27

## 2024-03-27 RX ORDER — HEPARIN SODIUM 5000 [USP'U]/ML
INJECTION, SOLUTION INTRAVENOUS; SUBCUTANEOUS EVERY 4 HOURS PRN
Status: DISCONTINUED | OUTPATIENT
Start: 2024-03-27 | End: 2024-03-27

## 2024-03-27 RX ORDER — DEXTROSE 50 % IN WATER (D50W) INTRAVENOUS SYRINGE
25
Status: DISCONTINUED | OUTPATIENT
Start: 2024-03-27 | End: 2024-03-30 | Stop reason: HOSPADM

## 2024-03-27 RX ORDER — ASPIRIN 81 MG/1
81 TABLET ORAL DAILY
Status: DISCONTINUED | OUTPATIENT
Start: 2024-03-28 | End: 2024-03-30 | Stop reason: HOSPADM

## 2024-03-27 RX ORDER — HEPARIN SODIUM 5000 [USP'U]/ML
4000 INJECTION, SOLUTION INTRAVENOUS; SUBCUTANEOUS ONCE
Status: COMPLETED | OUTPATIENT
Start: 2024-03-27 | End: 2024-03-27

## 2024-03-27 RX ORDER — HEPARIN SODIUM 10000 [USP'U]/100ML
0-4000 INJECTION, SOLUTION INTRAVENOUS CONTINUOUS
Status: DISCONTINUED | OUTPATIENT
Start: 2024-03-27 | End: 2024-03-27

## 2024-03-27 RX ORDER — SPIRONOLACTONE 25 MG/1
25 TABLET ORAL DAILY
Status: DISCONTINUED | OUTPATIENT
Start: 2024-03-27 | End: 2024-03-30 | Stop reason: HOSPADM

## 2024-03-27 RX ORDER — METOPROLOL SUCCINATE 50 MG/1
100 TABLET, EXTENDED RELEASE ORAL DAILY
Status: DISCONTINUED | OUTPATIENT
Start: 2024-03-27 | End: 2024-03-30 | Stop reason: HOSPADM

## 2024-03-27 RX ORDER — FUROSEMIDE 10 MG/ML
40 INJECTION INTRAMUSCULAR; INTRAVENOUS DAILY
Status: DISCONTINUED | OUTPATIENT
Start: 2024-03-28 | End: 2024-03-27

## 2024-03-27 RX ORDER — FUROSEMIDE 10 MG/ML
40 INJECTION INTRAMUSCULAR; INTRAVENOUS EVERY 12 HOURS
Status: DISCONTINUED | OUTPATIENT
Start: 2024-03-27 | End: 2024-03-28

## 2024-03-27 RX ORDER — DEXTROSE 50 % IN WATER (D50W) INTRAVENOUS SYRINGE
12.5
Status: DISCONTINUED | OUTPATIENT
Start: 2024-03-27 | End: 2024-03-30 | Stop reason: HOSPADM

## 2024-03-27 RX ORDER — MULTIVIT-MIN/IRON FUM/FOLIC AC 7.5 MG-4
1 TABLET ORAL DAILY
Status: DISCONTINUED | OUTPATIENT
Start: 2024-03-27 | End: 2024-03-30 | Stop reason: HOSPADM

## 2024-03-27 RX ORDER — INSULIN LISPRO 100 [IU]/ML
0-10 INJECTION, SOLUTION INTRAVENOUS; SUBCUTANEOUS
Status: DISCONTINUED | OUTPATIENT
Start: 2024-03-27 | End: 2024-03-30 | Stop reason: HOSPADM

## 2024-03-27 RX ORDER — NITROGLYCERIN 0.4 MG/1
0.4 TABLET SUBLINGUAL EVERY 5 MIN PRN
Status: DISCONTINUED | OUTPATIENT
Start: 2024-03-27 | End: 2024-03-30 | Stop reason: HOSPADM

## 2024-03-27 RX ORDER — HEPARIN SODIUM 5000 [USP'U]/ML
5000 INJECTION, SOLUTION INTRAVENOUS; SUBCUTANEOUS EVERY 8 HOURS SCHEDULED
Status: DISCONTINUED | OUTPATIENT
Start: 2024-03-27 | End: 2024-03-27

## 2024-03-27 RX ORDER — ZOLPIDEM TARTRATE 10 MG/1
10 TABLET ORAL NIGHTLY
COMMUNITY
Start: 2024-03-10 | End: 2024-04-11

## 2024-03-27 RX ORDER — ACETAMINOPHEN 650 MG/1
650 SUPPOSITORY RECTAL EVERY 4 HOURS PRN
Status: DISCONTINUED | OUTPATIENT
Start: 2024-03-27 | End: 2024-03-30 | Stop reason: HOSPADM

## 2024-03-27 RX ORDER — PANTOPRAZOLE SODIUM 40 MG/1
40 TABLET, DELAYED RELEASE ORAL
Status: DISCONTINUED | OUTPATIENT
Start: 2024-03-28 | End: 2024-03-30 | Stop reason: HOSPADM

## 2024-03-27 RX ADMIN — ISOSORBIDE MONONITRATE 60 MG: 60 TABLET, EXTENDED RELEASE ORAL at 12:49

## 2024-03-27 RX ADMIN — PERFLUTREN 2 ML OF DILUTION: 6.52 INJECTION, SUSPENSION INTRAVENOUS at 15:04

## 2024-03-27 RX ADMIN — ZOLPIDEM TARTRATE 10 MG: 5 TABLET, COATED ORAL at 21:12

## 2024-03-27 RX ADMIN — HEPARIN SODIUM 1200 UNITS/HR: 10000 INJECTION, SOLUTION INTRAVENOUS at 13:19

## 2024-03-27 RX ADMIN — INSULIN GLARGINE 10 UNITS: 100 INJECTION, SOLUTION SUBCUTANEOUS at 12:52

## 2024-03-27 RX ADMIN — LOSARTAN POTASSIUM 25 MG: 25 TABLET, FILM COATED ORAL at 12:50

## 2024-03-27 RX ADMIN — ATORVASTATIN CALCIUM 20 MG: 20 TABLET, FILM COATED ORAL at 12:50

## 2024-03-27 RX ADMIN — METOPROLOL SUCCINATE 100 MG: 50 TABLET, EXTENDED RELEASE ORAL at 12:50

## 2024-03-27 RX ADMIN — ASPIRIN 81 MG CHEWABLE TABLET 324 MG: 81 TABLET CHEWABLE at 12:50

## 2024-03-27 RX ADMIN — FUROSEMIDE 40 MG: 10 INJECTION, SOLUTION INTRAMUSCULAR; INTRAVENOUS at 10:21

## 2024-03-27 RX ADMIN — SPIRONOLACTONE 25 MG: 25 TABLET, FILM COATED ORAL at 12:50

## 2024-03-27 RX ADMIN — Medication 1 TABLET: at 12:50

## 2024-03-27 RX ADMIN — INSULIN LISPRO 2 UNITS: 100 INJECTION, SOLUTION INTRAVENOUS; SUBCUTANEOUS at 16:28

## 2024-03-27 RX ADMIN — NITROGLYCERIN 1 INCH: 20 OINTMENT TOPICAL at 09:12

## 2024-03-27 RX ADMIN — HEPARIN SODIUM 4000 UNITS: 5000 INJECTION INTRAVENOUS; SUBCUTANEOUS at 13:15

## 2024-03-27 RX ADMIN — Medication: at 08:18

## 2024-03-27 RX ADMIN — CLOPIDOGREL BISULFATE 75 MG: 75 TABLET ORAL at 12:50

## 2024-03-27 RX ADMIN — FUROSEMIDE 40 MG: 10 INJECTION, SOLUTION INTRAMUSCULAR; INTRAVENOUS at 22:30

## 2024-03-27 RX ADMIN — Medication: at 20:00

## 2024-03-27 SDOH — SOCIAL STABILITY: SOCIAL INSECURITY: DOES ANYONE TRY TO KEEP YOU FROM HAVING/CONTACTING OTHER FRIENDS OR DOING THINGS OUTSIDE YOUR HOME?: NO

## 2024-03-27 SDOH — SOCIAL STABILITY: SOCIAL INSECURITY: DO YOU FEEL ANYONE HAS EXPLOITED OR TAKEN ADVANTAGE OF YOU FINANCIALLY OR OF YOUR PERSONAL PROPERTY?: NO

## 2024-03-27 SDOH — SOCIAL STABILITY: SOCIAL INSECURITY: ARE THERE ANY APPARENT SIGNS OF INJURIES/BEHAVIORS THAT COULD BE RELATED TO ABUSE/NEGLECT?: NO

## 2024-03-27 SDOH — SOCIAL STABILITY: SOCIAL INSECURITY: HAS ANYONE EVER THREATENED TO HURT YOUR FAMILY OR YOUR PETS?: NO

## 2024-03-27 SDOH — SOCIAL STABILITY: SOCIAL INSECURITY: WERE YOU ABLE TO COMPLETE ALL THE BEHAVIORAL HEALTH SCREENINGS?: YES

## 2024-03-27 SDOH — SOCIAL STABILITY: SOCIAL INSECURITY: HAVE YOU HAD THOUGHTS OF HARMING ANYONE ELSE?: NO

## 2024-03-27 SDOH — SOCIAL STABILITY: SOCIAL INSECURITY: ARE YOU OR HAVE YOU BEEN THREATENED OR ABUSED PHYSICALLY, EMOTIONALLY, OR SEXUALLY BY ANYONE?: NO

## 2024-03-27 SDOH — SOCIAL STABILITY: SOCIAL INSECURITY: ABUSE: ADULT

## 2024-03-27 SDOH — SOCIAL STABILITY: SOCIAL INSECURITY: DO YOU FEEL UNSAFE GOING BACK TO THE PLACE WHERE YOU ARE LIVING?: NO

## 2024-03-27 ASSESSMENT — COGNITIVE AND FUNCTIONAL STATUS - GENERAL
STANDING UP FROM CHAIR USING ARMS: A LITTLE
MOVING TO AND FROM BED TO CHAIR: A LITTLE
WALKING IN HOSPITAL ROOM: A LITTLE
DAILY ACTIVITIY SCORE: 23
PATIENT BASELINE BEDBOUND: NO
TOILETING: A LITTLE
MOBILITY SCORE: 20
CLIMB 3 TO 5 STEPS WITH RAILING: A LITTLE
DAILY ACTIVITIY SCORE: 23
TOILETING: A LITTLE
CLIMB 3 TO 5 STEPS WITH RAILING: A LITTLE
WALKING IN HOSPITAL ROOM: A LITTLE
STANDING UP FROM CHAIR USING ARMS: A LITTLE
MOBILITY SCORE: 20
MOVING TO AND FROM BED TO CHAIR: A LITTLE

## 2024-03-27 ASSESSMENT — LIFESTYLE VARIABLES
TOTAL SCORE: 0
SKIP TO QUESTIONS 9-10: 1
HOW MANY STANDARD DRINKS CONTAINING ALCOHOL DO YOU HAVE ON A TYPICAL DAY: PATIENT DOES NOT DRINK
EVER HAD A DRINK FIRST THING IN THE MORNING TO STEADY YOUR NERVES TO GET RID OF A HANGOVER: NO
HOW OFTEN DO YOU HAVE A DRINK CONTAINING ALCOHOL: NEVER
HAVE PEOPLE ANNOYED YOU BY CRITICIZING YOUR DRINKING: NO
SUBSTANCE_ABUSE_PAST_12_MONTHS: NO
AUDIT-C TOTAL SCORE: 0
HOW OFTEN DO YOU HAVE 6 OR MORE DRINKS ON ONE OCCASION: NEVER
EVER FELT BAD OR GUILTY ABOUT YOUR DRINKING: NO
AUDIT-C TOTAL SCORE: 0
HAVE YOU EVER FELT YOU SHOULD CUT DOWN ON YOUR DRINKING: NO
PRESCIPTION_ABUSE_PAST_12_MONTHS: NO

## 2024-03-27 ASSESSMENT — COLUMBIA-SUICIDE SEVERITY RATING SCALE - C-SSRS
6. HAVE YOU EVER DONE ANYTHING, STARTED TO DO ANYTHING, OR PREPARED TO DO ANYTHING TO END YOUR LIFE?: NO
1. IN THE PAST MONTH, HAVE YOU WISHED YOU WERE DEAD OR WISHED YOU COULD GO TO SLEEP AND NOT WAKE UP?: NO
2. HAVE YOU ACTUALLY HAD ANY THOUGHTS OF KILLING YOURSELF?: NO
2. HAVE YOU ACTUALLY HAD ANY THOUGHTS OF KILLING YOURSELF?: NO
1. IN THE PAST MONTH, HAVE YOU WISHED YOU WERE DEAD OR WISHED YOU COULD GO TO SLEEP AND NOT WAKE UP?: NO
6. HAVE YOU EVER DONE ANYTHING, STARTED TO DO ANYTHING, OR PREPARED TO DO ANYTHING TO END YOUR LIFE?: NO

## 2024-03-27 ASSESSMENT — ACTIVITIES OF DAILY LIVING (ADL)
HEARING - LEFT EAR: FUNCTIONAL
HEARING - RIGHT EAR: FUNCTIONAL
GROOMING: INDEPENDENT
DRESSING YOURSELF: INDEPENDENT
ADEQUATE_TO_COMPLETE_ADL: YES
WALKS IN HOME: NEEDS ASSISTANCE
BATHING: INDEPENDENT
LACK_OF_TRANSPORTATION: NO
FEEDING YOURSELF: INDEPENDENT
PATIENT'S MEMORY ADEQUATE TO SAFELY COMPLETE DAILY ACTIVITIES?: YES
TOILETING: INDEPENDENT
JUDGMENT_ADEQUATE_SAFELY_COMPLETE_DAILY_ACTIVITIES: YES

## 2024-03-27 ASSESSMENT — PAIN SCALES - GENERAL
PAINLEVEL_OUTOF10: 0 - NO PAIN

## 2024-03-27 ASSESSMENT — PAIN - FUNCTIONAL ASSESSMENT
PAIN_FUNCTIONAL_ASSESSMENT: 0-10
PAIN_FUNCTIONAL_ASSESSMENT: 0-10

## 2024-03-27 ASSESSMENT — PATIENT HEALTH QUESTIONNAIRE - PHQ9
1. LITTLE INTEREST OR PLEASURE IN DOING THINGS: NOT AT ALL
2. FEELING DOWN, DEPRESSED OR HOPELESS: NOT AT ALL
SUM OF ALL RESPONSES TO PHQ9 QUESTIONS 1 & 2: 0

## 2024-03-27 NOTE — PROGRESS NOTES
Pharmacy Medication History Review    Fredrick De La Paz is a 73 y.o. male admitted for No Principal Problem: There is no principal problem currently on the Problem List. Please update the Problem List and refresh.. Pharmacy reviewed the patient's [id number] medications and allergies for accuracy.    The list below reflectives the updated PTA list. Please review each medication in order reconciliation for additional clarification and justification.  Prior to Admission medications    Medication Sig Start Date End Date Taking? Authorizing Provider   allopurinol (Zyloprim) 100 mg tablet Take 1 tablet (100 mg) by mouth once daily. 12/7/23  Yes Historical Provider, MD   zolpidem (Ambien) 10 mg tablet Take 1 tablet (10 mg) by mouth once daily at bedtime. 3/10/24 4/9/24 Yes Historical Provider, MD   aspirin 81 mg EC tablet Take 1 tablet (81 mg) by mouth once daily. 9/17/19   Historical Provider, MD   atorvastatin (Lipitor) 20 mg tablet Take 1 tablet (20 mg) by mouth once daily.    Historical Provider, MD Cr Garrett U-100 Insulin 100 unit/mL (3 mL) pen Inject 10 Units under the skin once daily in the morning. 9/12/23   Historical Provider, MD   clopidogrel (Plavix) 75 mg tablet Take 1 tablet (75 mg) by mouth once daily. 11/14/23   Historical Provider, MD   empagliflozin (Jardiance) 10 mg Take 1 tablet (10 mg) by mouth once daily. 3/12/24 3/12/25  Nydia De La Paz MD   insulin lispro (HumaLOG U-100 Insulin) 100 unit/mL injection Inject 0.15 mL (15 Units) under the skin 2 times a day with meals. Take as directed per insulin instructions. 2/27/24 2/26/25  Nydia De La Paz MD   isosorbide mononitrate ER (Imdur) 60 mg 24 hr tablet Take 1 tablet (60 mg) by mouth once daily. 6/24/21   Historical Provider, MD   losartan (Cozaar) 25 mg tablet Take 1 tablet (25 mg) by mouth once daily.    Historical Provider, MD   metoprolol succinate XL (Toprol-XL) 100 mg 24 hr tablet Take 1 tablet (100 mg) by mouth once daily.     Historical Provider, MD   multivitamin tablet Take 1 tablet by mouth once daily.    Historical Provider, MD   pantoprazole (ProtoNix) 40 mg EC tablet Take 1 tablet (40 mg) by mouth once daily in the morning. Take before meals. 1/16/24   Historical Provider, MD   spironolactone (Aldactone) 25 mg tablet Take 1 tablet (25 mg) by mouth once daily. 2/24/24   Historical Provider, MD   torsemide (Demadex) 20 mg tablet Take 1 tablet (20 mg) by mouth 2 times a day.    Historical Provider, MD   traZODone (Desyrel) 50 mg tablet Take 1 tablet (50 mg) by mouth as needed at bedtime for sleep for up to 7 days. NEW - did not start yet 3/26/24 4/2/24  Nydia De La Paz MD        The list below reflectives the updated allergy list. Please review each documented allergy for additional clarification and justification.  Allergies  Reviewed by Tomeka Ely RN on 3/27/2024   No Known Allergies         Below are additional concerns with the patient's PTA list.      Roula Rodriguez

## 2024-03-27 NOTE — ED TRIAGE NOTES
Pt presents to ED for report of difficulty breathing. Pt has hx of fluid in the lungs. Pt SPO2 on arrival  76% on RA with increased work of breathing. Pt repots mild CP.

## 2024-03-27 NOTE — CARE PLAN
The patient's goals for the shift include Maintain pulse ox > 92%    The clinical goals for the shift include maintain pulse ox >92%

## 2024-03-27 NOTE — CONSULTS
Pulmonology Consult Note:      Fredrick De La Paz is a 73 y.o. male with a PMHx of complex CAD with ischemic cardiomyopathy (remote CABG, PCI left main to LCx 2/5/2024, PCI VERONIQUE to LAD 3/13/2024), insomnia, HTN, HLD, advanced CKD, type 2 diabetes mellitus, and diabetic neuropathy who presents to the ED today with complaints of shortness of breath.    Subjective   Patient states that he started feeling worsening shortness of breath yesterday compared to his baseline.  States he had to sleep sitting up last night due to difficulty breathing.  Says the shortness of breath has been worse when he is laying down and also when he is exerting himself.  States the shortness of breath became progressively worse this morning which is why he went to come to the ED for evaluation.  Also reports mild chest pain and nausea, denies any vomiting.  Patient states he was recently at [facility] with the same issues and required a cardiac catheterization, stenting, and bilateral pleurocentesis.  Patient was placed on BiPAP in the ED, states his breathing feels much better now and his chest pain has almost resolved.  Admits to occasional dizziness when he becomes very short of breath.  Denies headaches, abdominal pain, vomiting, urinary changes, fevers or chills, or new weakness.  Does not wear oxygen at home.  Uses a cane to help with ambulation.     ED course: On arrival to the ED, patient was afebrile, heart rate 86, tachypneic with heart rate 24, blood pressure 151/75, and hypoxic with SpO2 78% on room air.  Patient was placed on BiPAP and most recent SpO2 at 92%.  Glucose 262, BUN 36/creatinine 2.61, BNP 2456, troponin at 0821 was 44, repeat at 0931 was 134.  WBC 9.9 with slight left shift.  Hemoglobin 12.7/hematocrit 40.7.  Chest x-ray shows cardiomegaly with perihilar vascular congestion (R>L) infiltrates and/or pleural effusions.  ECG demonstrated sinus tachycardia with old anterior MI, unchanged from previous ECG..   "Echocardiogram, 12/3/23 ([facility]): Focally abnormal LV systolic function, LVEF is 55%.  Since then, echo today demonstrated EF of 25%, moderately dilated left atrium, mild to moderate mitral valve regurgitation, mild aortic valve regurgitation, mildly elevated RVSP.    ED Course as of 03/27/24 1019   Wed Mar 27, 2024   1009 Reevaluated patient.  Patient is feeling much better on BiPAP.  Discussed plan of admission.  Patient agrees with plan.  Discussed with Dr. Kuo and accepts patient to his service.  Discussed with admitting NP.  Will admit patient to stepdown. [JA]       ED Course User Index  [JA] Thomas HASKINS Heaton,            Diagnoses as of 03/27/24 1019   Shortness of breath   Hypoxia   Acute congestive heart failure, unspecified heart failure type (CMS/HCC)        Past pulmonology history:  -PCP Nydia De La Paz MD  -PFTs: No results found for: \"FEV1\", \"FVC\", \"BXR8TVF\", \"TLC\", \"DLCO\"    A 10 point ROS was performed with the patient denying any complaint at this time aside from those listed in the HPI above.     Current Outpatient Medications   Medication Instructions    allopurinol (ZYLOPRIM) 100 mg, oral, Daily    aspirin 81 mg, oral, Daily    atorvastatin (LIPITOR) 20 mg, oral, Daily    Basaglar KwikPen U-100 Insulin 100 unit/mL (3 mL) pen Inject 10 Units under the skin once daily in the morning.    clopidogrel (PLAVIX) 75 mg, oral, Daily    empagliflozin (JARDIANCE) 10 mg, oral, Daily    insulin lispro (HUMALOG U-100 INSULIN) 15 Units, subcutaneous, 2 times daily with meals, Take as directed per insulin instructions.    isosorbide mononitrate ER (IMDUR) 60 mg, oral, Daily    losartan (COZAAR) 25 mg, oral, Daily    metoprolol succinate XL (TOPROL-XL) 100 mg, oral, Daily    multivitamin tablet 1 tablet, oral, Daily    pantoprazole (PROTONIX) 40 mg, oral, Daily before breakfast    spironolactone (ALDACTONE) 25 mg, oral, Daily    torsemide (DEMADEX) 20 mg, oral, 2 times daily    traZODone (DESYREL) 50 mg, " oral, Nightly PRN    zolpidem (AMBIEN) 10 mg, oral, Nightly     Past Medical History:   Diagnosis Date    Acute cystitis with hematuria 12/31/2016    Acute cystitis with hematuria    Personal history of other diseases of the circulatory system     History of hypertension    Personal history of other diseases of the circulatory system     History of cardiac disorder    Personal history of other endocrine, nutritional and metabolic disease     History of diabetic neuropathy    Personal history of other endocrine, nutritional and metabolic disease     History of hyperlipidemia    Personal history of other endocrine, nutritional and metabolic disease     History of type 2 diabetes mellitus    Personal history of other specified conditions     History of insomnia      Past Surgical History:   Procedure Laterality Date    OTHER SURGICAL HISTORY  09/17/2019    Bypass    OTHER SURGICAL HISTORY  09/17/2019    Arterial stent placement     No family history on file.  Social History     Socioeconomic History    Marital status:      Spouse name: Not on file    Number of children: Not on file    Years of education: Not on file    Highest education level: Not on file   Occupational History    Not on file   Tobacco Use    Smoking status: Never    Smokeless tobacco: Never   Substance and Sexual Activity    Alcohol use: Not Currently    Drug use: Not on file    Sexual activity: Not on file   Other Topics Concern    Not on file   Social History Narrative    Not on file     Social Determinants of Health     Financial Resource Strain: Low Risk  (3/27/2024)    Overall Financial Resource Strain (CARDIA)     Difficulty of Paying Living Expenses: Not hard at all   Food Insecurity: Not on file   Transportation Needs: No Transportation Needs (3/27/2024)    PRAPARE - Transportation     Lack of Transportation (Medical): No     Lack of Transportation (Non-Medical): No   Physical Activity: Not on file   Stress: Not on file   Social  "Connections: Not on file   Intimate Partner Violence: Not on file   Housing Stability: Low Risk  (3/27/2024)    Housing Stability Vital Sign     Unable to Pay for Housing in the Last Year: No     Number of Places Lived in the Last Year: 1     Unstable Housing in the Last Year: No      No Known Allergies   Code Status: Full Code    Objective   /71   Pulse 88   Temp 35.9 °C (96.6 °F)   Resp (!) 28   Ht 1.676 m (5' 5.98\")   Wt 68.5 kg (151 lb 0.2 oz)   SpO2 95%   BMI 24.39 kg/m²     Labs, radiological imaging and cardiac work up were personally reviewed    Input/Output:  No intake or output data in the 24 hours ending 03/27/24 1926    Ventilator/Oxygen Supply:  FiO2 (%):  [35 %-50 %] 50 %  Oxygen Therapy/Pulse Ox  Medical Gas Therapy: Supplemental oxygen  O2 Delivery Method: CPAP/Bi-PAP mask  FiO2 (%): 50 %  SpO2: 95 %  Patient Activity During SpO2 Measurement: At rest  Temp: 35.9 °C (96.6 °F)    Labs:   Results from last 7 days   Lab Units 03/27/24  0821   SODIUM mmol/L 137   POTASSIUM mmol/L 4.8   CHLORIDE mmol/L 103   CO2 mmol/L 21   BUN mg/dL 36*   CREATININE mg/dL 2.61*   GLUCOSE mg/dL 262*   CALCIUM mg/dL 9.7     Results from last 7 days   Lab Units 03/27/24  0821   WBC AUTO x10*3/uL 9.9   HEMOGLOBIN g/dL 12.7*   HEMATOCRIT % 40.7*   PLATELETS AUTO x10*3/uL 246     Imaging:  Transthoracic Echo (TTE) Complete    Result Date: 3/27/2024    [facility], [address] [place] [address] [id number] [phone] and                                 Fax [phone] TRANSTHORACIC ECHOCARDIOGRAM REPORT  Patient Name:      CACHORRO Pickett Physician:    [id number] Efrain Dempsey MD Study Date:        3/27/2024            Ordering Provider:    [id number] LARY BURK MRN/PID:           [id number]             Fellow: Accession#:        [id number]         Nurse: Date of Birth/Age: " 1950 / 73      Sonographer:          Julio Glass ACS,                    years                                      RDCS, FASE Gender:            M                    Additional Staff: Height:            167.64 cm            Admit Date:           3/27/2024 Weight:            68.49 kg             Admission Status:     Inpatient - STAT BSA / BMI:         1.77 m2 / 24.37      Encounter#:           [id number]                    kg/m2                                         Department Location:  [facility] Blood Pressure: 130 /73 mmHg Study Type:    TRANSTHORACIC ECHO (TTE) COMPLETE Diagnosis/ICD: Shortness of breath-R06.02 Indication:    Dyspnea CPT Code:      Echo Complete w Full Doppler-[id number] Patient History: Pertinent History: Dyspnea, CAD and CHF. Hx of CABG, CKD, DM. Study Detail: The following Echo studies were performed: 2D, M-Mode, Doppler and               color flow. Technically challenging study due to body habitus,               Very SOB and coughing throughout exam. and prominent lung               artifact. Definity used as a contrast agent for endocardial border               definition. Total contrast used for this procedure was 2 mL via IV               push.  Critical Event Critical Event: Test was completed as per department protocol. Critical Finding: Severe LV dysfunction. Time Test was Completed: 3:22:00 PM Notified: Dr. Dempsey. Attending notification time: 3:14:22 PM  PHYSICIAN INTERPRETATION: Left Ventricle: The left ventricular systolic function is severely decreased, with an estimated ejection fraction of 25%. There are multiple wall motion abnormalities. The left ventricular cavity size is normal. Left ventricular diastolic filling was not assessed. Left Atrium: The left atrium is moderately dilated. Right Ventricle: The right ventricle is normal in size. There is normal right ventricular global systolic function. Right Atrium: The right atrium is normal in size. Aortic Valve: The  aortic valve is trileaflet. There is mild aortic valve cusp calcification. There is mild aortic valve regurgitation. The peak instantaneous gradient of the aortic valve is 6.2 mmHg. The mean gradient of the aortic valve is 4.0 mmHg. Mitral Valve: The mitral valve is mildly thickened. There is mild to moderate mitral annular calcification. There is mild to moderate mitral valve regurgitation. Tricuspid Valve: The tricuspid valve is structurally normal. There is mild tricuspid regurgitation. The Doppler estimated RVSP is mildly elevated at 41.4 mmHg. Pulmonic Valve: The pulmonic valve is not well visualized. The pulmonic valve regurgitation was not well visualized. Pericardium: There is no pericardial effusion noted. Aorta: The aortic root is normal.  CONCLUSIONS:  1. Left ventricular systolic function is severely decreased with a 25% estimated ejection fraction.  2. The left atrium is moderately dilated.  3. Mild to moderate mitral valve regurgitation.  4. Mild aortic valve regurgitation.  5. Mildly elevated RVSP. QUANTITATIVE DATA SUMMARY: 2D MEASUREMENTS:                          Normal Ranges: Ao Root d:     3.00 cm   (2.0-3.7cm) LAs:           4.90 cm   (2.7-4.0cm) IVSd:          0.76 cm   (0.6-1.1cm) LVPWd:         0.84 cm   (0.6-1.1cm) LVIDd:         5.08 cm   (3.9-5.9cm) LVIDs:         3.86 cm LV Mass Index: 78.5 g/m2 LV % FS        24.0 % LA VOLUME:                               Normal Ranges: LA Vol A4C:        49.2 ml    (22+/-6mL/m2) LA Vol A2C:        70.7 ml LA Vol BP:         60.6 ml LA Vol Index A4C:  27.7ml/m2 LA Vol Index A2C:  39.8 ml/m2 LA Vol Index BP:   34.2 ml/m2 LA Area A4C:       18.0 cm2 LA Area A2C:       21.0 cm2 LA Major Axis A4C: 5.6 cm LA Major Axis A2C: 5.3 cm LA Volume Index:   33.0 ml/m2 RA VOLUME BY A/L METHOD:                      Normal Ranges: RA Area A4C: 8.3 cm2 M-MODE MEASUREMENTS:                  Normal Ranges: Ao Root: 3.20 cm (2.0-3.7cm) LAs:     4.70 cm (2.7-4.0cm) AORTA  MEASUREMENTS:                    Normal Ranges: Asc Ao, d: 3.20 cm (2.1-3.4cm) LV SYSTOLIC FUNCTION BY 2D PLANIMETRY (MOD):                     Normal Ranges: EF-A4C View: 36.0 % (>=55%) EF-A2C View: 36.6 % EF-Biplane:  33.9 % LV DIASTOLIC FUNCTION:                        Normal Ranges: MV Peak E:    1.24 m/s (0.7-1.2 m/s) MV Peak A:    0.76 m/s (0.42-0.7 m/s) E/A Ratio:    1.63     (1.0-2.2) MV lateral e' 0.10 m/s MV medial e'  0.07 m/s MITRAL VALVE:                Normal Ranges: MV DT: 90 msec (150-240msec) MITRAL INSUFFICIENCY:                          Normal Ranges: PISA Radius:  0.3 cm MR Alias Jonathan: 38.5 cm/s MR Flow Rt:   21.77 ml/s AORTIC VALVE:                                   Normal Ranges: AoV Vmax:                1.25 m/s (<=1.7m/s) AoV Peak P.2 mmHg (<20mmHg) AoV Mean P.0 mmHg (1.7-11.5mmHg) LVOT Max Jonathan:            0.98 m/s (<=1.1m/s) AoV VTI:                 23.40 cm (18-25cm) LVOT VTI:                19.40 cm LVOT Diameter:           1.80 cm  (1.8-2.4cm) AoV Area, VTI:           2.11 cm2 (2.5-5.5cm2) AoV Area,Vmax:           2.00 cm2 (2.5-4.5cm2) AoV Dimensionless Index: 0.83  RIGHT VENTRICLE: RV Basal 2.42 cm TAPSE:   9.6 mm RV s'    0.09 m/s TRICUSPID VALVE/RVSP:                             Normal Ranges: Peak TR Velocity: 3.10 m/s RV Syst Pressure: 41.4 mmHg (< 30mmHg) IVC Diam:         1.60 cm PULMONIC VALVE:                      Normal Ranges: PV Max Jonathan: 0.8 m/s  (0.6-0.9m/s) PV Max P.9 mmHg  [id number] Efrain Dempsey MD Electronically signed on 3/27/2024 at 3:46:36 PM  ** Final **     XR chest 1 view    Result Date: 3/27/2024  Interpreted By:  Gerda Falcon, STUDY: XR CHEST 1 VIEW;  3/27/2024 8:35 am   INDICATION: Signs/Symptoms:sob.   COMPARISON: None.   ACCESSION NUMBER(S): [id number]   ORDERING CLINICIAN: NAYAN FAN   FINDINGS: Artifact from overlying monitoring leads noted. Perihilar vascular congestion. Bibasilar opacities with blunting of the  right-greater-than-left costophrenic angles. Cardiac silhouette is partially obscured but likely enlarged, status post sternotomy.       Cardiomegaly with perihilar vascular congestion and right-greater-than-left infiltrates and/or pleural effusions. Follow-up to ensure resolution after appropriate treatment recommended.   MACRO: None.   Signed by: Gerda Falcon 3/27/2024 8:39 AM Dictation workstation:   [id number]    Medications:  Inpatient scheduled medications:  [Held by provider] allopurinol, 100 mg, oral, Daily  [START ON 3/28/2024] aspirin, 81 mg, oral, Daily  atorvastatin, 20 mg, oral, Daily  clopidogrel, 75 mg, oral, Daily  furosemide, 40 mg, intravenous, q12h  insulin glargine, 10 Units, subcutaneous, q AM  insulin lispro, 0-10 Units, subcutaneous, TID with meals  isosorbide mononitrate ER, 60 mg, oral, Daily  losartan, 25 mg, oral, Daily  metoprolol succinate XL, 100 mg, oral, Daily  multivitamin with minerals, 1 tablet, oral, Daily  oxygen, , inhalation, Continuous - Inhalation  [START ON 3/28/2024] pantoprazole, 40 mg, oral, Daily before breakfast  spironolactone, 25 mg, oral, Daily  zolpidem, 10 mg, oral, Nightly    Inpatient PRN medications:  PRN medications: acetaminophen **OR** acetaminophen **OR** acetaminophen, dextrose, dextrose, glucagon, glucagon, nitroglycerin, ondansetron **OR** ondansetron    Physical Exam:   GENERAL: Awake/alert, cooperative, ill-appearing, poor insight  HEAD:  Normocephalic, atraumatic.  ENT:  No nasal discharge, no coryza. Mucous membranes moist and pink.  NECK:  Atraumatic, no cervical lymphadenopathy bilaterally.  Apparent JVP bilaterally.  CARDIOVASCULAR: RRR, positive S1 & S2.  Soft apical systolic murmur auscultated.  RESPIRATORY: Tachypneic, Rales auscultated in the middle lobes bilaterally, diminished breath sounds in the bases bilaterally.  ABDOMEN:  Hypoactive bowel sounds.  EXTREMITIES:  No peripheral edema, no calf tenderness. Peripheral pulses  intact.    Assessment/Plan   #HFrEF decompensation  #Pulmonary edema  #Moderate right pleural effusion  #Small left pleural effusion  #Acute hypoxic respiratory failure 2/2 above  -Diuresis and GDMT optimization per primary and cardiology  -CHF protocol with salt and fluid restrictions  -Continue BiPAP to maintain SpO2 >94%, wean as tolerated.  Anticipating home-going O2 needs  -Symptomatic respiratory treatments as needed  -Repeat CXR after some diuresis to assess for thoracentesis        Bill Shaikh, DO   Internal Medicine PGY-1     This is a preliminary note written by the resident. Please wait for attending addendum for finalization of note and recommendations.

## 2024-03-27 NOTE — NURSING NOTE
HF Clinical Nurse Navigator Documentation     Congestive Heart Failure disease education was attempted by the Clinical Nurse Navigator. Patient is not appropriate for education at this time.  asleep

## 2024-03-27 NOTE — ED PROVIDER NOTES
HPI   Chief Complaint   Patient presents with    Shortness of Breath       Patient presenting for evaluation of shortness of breath.  Patient states he started feeling worsening shortness of breath over his baseline yesterday.  Patient states he could not sleep all night.  Patient states he had to sleep sitting up.  Patient notes mild chest pain.  Patient is having nausea but not vomiting.  Patient denies diarrhea.  Denies fever or chills.  Patient notes he was recently in [facility] for the same issues and he required a cardiac catheterization and stenting at that time.  Patient also underwent pleurocentesis bilaterally.      History provided by:  Patient                      [place] Coma Scale Score: 15                     Patient History   Past Medical History:   Diagnosis Date    Acute cystitis with hematuria 12/31/2016    Acute cystitis with hematuria    Personal history of other diseases of the circulatory system     History of hypertension    Personal history of other diseases of the circulatory system     History of cardiac disorder    Personal history of other endocrine, nutritional and metabolic disease     History of diabetic neuropathy    Personal history of other endocrine, nutritional and metabolic disease     History of hyperlipidemia    Personal history of other endocrine, nutritional and metabolic disease     History of type 2 diabetes mellitus    Personal history of other specified conditions     History of insomnia     Past Surgical History:   Procedure Laterality Date    OTHER SURGICAL HISTORY  09/17/2019    Bypass    OTHER SURGICAL HISTORY  09/17/2019    Arterial stent placement     No family history on file.  Social History     Tobacco Use    Smoking status: Never    Smokeless tobacco: Never   Substance Use Topics    Alcohol use: Not Currently    Drug use: Not on file       Physical Exam   ED Triage Vitals   Temp Pulse Resp BP   -- -- -- --      SpO2 Temp src Heart Rate Source Patient  Position   -- -- -- --      BP Location FiO2 (%)     -- --       Physical Exam  Vitals and nursing note reviewed.   Constitutional:       Appearance: Normal appearance. He is ill-appearing.      Interventions: Nasal cannula in place.   HENT:      Head: Atraumatic.      Right Ear: External ear normal.      Left Ear: External ear normal.      Nose: Nose normal.      Mouth/Throat:      Mouth: Mucous membranes are moist.      Pharynx: Oropharynx is clear.   Eyes:      Extraocular Movements: Extraocular movements intact.      Pupils: Pupils are equal, round, and reactive to light.   Cardiovascular:      Rate and Rhythm: Normal rate and regular rhythm.      Pulses: Normal pulses.   Pulmonary:      Effort: Tachypnea and accessory muscle usage present.      Breath sounds: Examination of the right-middle field reveals rales. Examination of the left-middle field reveals rales. Examination of the right-lower field reveals decreased breath sounds. Examination of the left-lower field reveals decreased breath sounds. Decreased breath sounds and rales present.   Abdominal:      Palpations: Abdomen is soft.      Tenderness: There is no abdominal tenderness.   Musculoskeletal:         General: No signs of injury. Normal range of motion.      Cervical back: Normal range of motion and neck supple. No rigidity or tenderness.      Right lower leg: Tenderness present.      Left lower leg: Tenderness present.   Skin:     General: Skin is warm and dry.   Neurological:      General: No focal deficit present.      Mental Status: He is alert and oriented to person, place, and time. Mental status is at baseline.   Psychiatric:         Mood and Affect: Mood normal.         Behavior: Behavior normal.         ED Course & MDM   ED Course as of 03/27/24 1019   Wed Mar 27, 2024   1009 Reevaluated patient.  Patient is feeling much better on BiPAP.  Discussed plan of admission.  Patient agrees with plan.  Discussed with Dr. Kuo and accepts patient  to his service.  Discussed with admitting NP.  Will admit patient to stepdown. [JA]      ED Course User Index  [JA] Thomas Heaton DO         Diagnoses as of 03/27/24 1019   Shortness of breath   Hypoxia   Acute congestive heart failure, unspecified heart failure type (CMS/HCC)       Medical Decision Making  Differential diagnosis: Acute CHF exacerbation, pulmonary edema, pneumonia, MI, PE, other    Patient presented for shortness of breath.  Patient notes similar to previous episodes.  Rales on exam.  Patient does have a history of CHF.  JVD present on exam.  Given hypoxia and work of breathing patient started on BiPAP.  Chest x-ray showing congestive changes.  Elevated BNP.  Patient does have renal dysfunction as well.  Mild elevation of troponin.  EKG does not show STEMI.  Pulses equal.  Reevaluated patient is now resting comfortably.  Discussed with plan of admission.  Will start diuresis.  Discussed with admitting team.        Procedure  ECG 12 lead    Performed by: Thomas Heaton DO  Authorized by: Thomas Heaton DO    ECG interpreted by ED Physician in the absence of a cardiologist: yes    Previous ECG:     Previous ECG:  Compared to current    Similarity:  Changes noted    Comparison ECG info:  10/27/1997 no motion artifact  Comments:      3/27/2024 07: 59 sinus rhythm with first-degree AV block, rate 98, normal axis, ST elevation V1, T wave flattening in inferior lateral leads, motion artifact, abnormal EKG.  Critical Care    Performed by: Thomas Heaton DO  Authorized by: Thomas Heaton DO    Critical care provider statement:     Critical care time (minutes):  35    Critical care time was exclusive of:  Separately billable procedures and treating other patients    Critical care was necessary to treat or prevent imminent or life-threatening deterioration of the following conditions:  Respiratory failure    Critical care was time spent personally by me on the following activities:  Discussions with  consultants, discussions with primary provider, ordering and performing treatments and interventions, ordering and review of laboratory studies, ordering and review of radiographic studies, pulse oximetry, re-evaluation of patient's condition, review of old charts, evaluation of patient's response to treatment and development of treatment plan with patient or surrogate       Thomas Heaton DO  03/27/24 1015

## 2024-03-27 NOTE — CONSULTS
Cardiology Consult    Impression:  HFrEF.  Acute on chronic decompensated.  Ischemic cardiomyopathy.  EF 25%.  Non-STEMI  CAD.  Remote CABG.  PCI left main to LCx 2/5/2024.  PCI VERONIQUE to LAD 3/13/2024.  Advanced renal disease.  Baseline creatinine 2.0-2.5.  Hypertension  Hyperlipidemia  Diabetes  Plan:  Medical therapy for CAD; Continue DAPT, statin, nitrates, beta-blocker.  Discontinue IV heparin  No plans for cardiac catheterization at this time  IV Lasix.  Continue ARB, Aldactone.  Depending on response to diuretic, may benefit from repeat thoracentesis.  No SGLT2 inhibitor due to advanced renal disease  Follow labs  CC  Shortness of breath  HPI:  73-year-old man presenting to ER for evaluation of shortness of breath.  Patient has complex coronary artery disease with ischemic cardiomyopathy and multiple admissions to [facility] in the last couple of months with decompensated heart failure in the setting of advanced renal disease.  He had high risk PCI to his left main into the circumflex 2/5/2024.  He had a staged PCI from the VERONIQUE into the LAD 3/13/2024.  These were very complex interventions.  He has chronic NYHA class III shortness of breath.  He can walk typically 25 feet without getting out of breath.  He thinks his breathing was a little bit better after the last PCI.  He now comes in with shortness of breath at rest and orthopnea.  Found to be hypoxic.  No chest pain.  No palpitations, presyncope or syncope.  EKG shows sinus tachycardia with an old anterior MI, unchanged from previous.  Chest x-ray showed pulmonary edema with a moderate right-sided pleural effusion and a small left pleural effusion.  Troponin trended from .  BNP 2500.  Started on BiPAP.  He was given some IV Lasix.  Breathing is better today.  Meds:  Scheduled medications  [Held by provider] allopurinol, 100 mg, oral, Daily  [START ON 3/28/2024] aspirin, 81 mg, oral, Daily  atorvastatin, 20 mg, oral, Daily  clopidogrel, 75 mg,  oral, Daily  furosemide, 40 mg, intravenous, q12h  insulin glargine, 10 Units, subcutaneous, q AM  insulin lispro, 0-10 Units, subcutaneous, TID with meals  isosorbide mononitrate ER, 60 mg, oral, Daily  losartan, 25 mg, oral, Daily  metoprolol succinate XL, 100 mg, oral, Daily  multivitamin with minerals, 1 tablet, oral, Daily  oxygen, , inhalation, Continuous - Inhalation  [START ON 3/28/2024] pantoprazole, 40 mg, oral, Daily before breakfast  spironolactone, 25 mg, oral, Daily  zolpidem, 10 mg, oral, Nightly      Continuous medications     PRN medications  PRN medications: acetaminophen **OR** acetaminophen **OR** acetaminophen, dextrose, dextrose, glucagon, glucagon, nitroglycerin, ondansetron **OR** ondansetron    PMHx:  CHF  CAD  CABG  Hypertension  Hyperlipidemia  Diabetes  CKD  Social history:  Lives in the community.  No cigarettes or alcohol.  Family history:  Noncontributory  Review of systems:  See HPI  Physical exam:  Vitals:    03/27/24 1537   BP: 124/71   Pulse:    Resp:    Temp: 35.9 °C (96.6 °F)   SpO2:       JVP +10.  Carotid upstroke normal.  Heart sounds normal.  Soft apical systolic murmur.  Chest exam reveals markedly decreased breath sounds bilaterally.  Abdomen soft.  No masses.  No edema.  EKG:  Sinus tachycardia with an old anterior MI.  No change from previous.  Echo:  EF 25%.  Labs:  Lab Results   Component Value Date    WBC 9.9 03/27/2024    HGB 12.7 (L) 03/27/2024    HCT 40.7 (L) 03/27/2024     03/27/2024    CHOL 184 09/18/2019    TRIG 176 (H) 09/18/2019    HDL 47.1 09/18/2019    ALT 14 03/27/2024    AST 19 03/27/2024     03/27/2024    K 4.8 03/27/2024     03/27/2024    CREATININE 2.61 (H) 03/27/2024    BUN 36 (H) 03/27/2024    CO2 21 03/27/2024    TSH 1.90 09/18/2019    INR 1.1 03/27/2024    HGBA1C 8.0 (H) 12/03/2023     par

## 2024-03-27 NOTE — H&P
History Of Present Illness  Fredrick De La Paz is a 73 y.o. male with past medical history significant for insomnia, HTN, HLD, CKD, type 2 diabetes mellitus, and diabetic neuropathy who presents to the ED today with complaints of shortness of breath.  States he started feeling worsening shortness of breath yesterday compared to his baseline.  States he had to sleep sitting up last night due to difficulty breathing.  Says the shortness of breath has been worse when he is laying down and also when he is exerting himself.  States the shortness of breath became progressively worse this morning which is why he went to come to the ED for evaluation.  Also reports mild chest pain and nausea, denies any vomiting.  Patient states he was recently at [facility] with the same issues and required a cardiac catheterization, stenting, and bilateral pleurocentesis.  Patient was placed on BiPAP in the ED, states his breathing feels much better now and his chest pain has almost resolved.  Admits to occasional dizziness when he becomes very short of breath.  Denies headaches, abdominal pain, vomiting, urinary changes, fevers or chills, or new weakness.  Does not wear oxygen at home.  Uses a cane to help with ambulation.    ED course: On arrival to the ED, patient was afebrile, heart rate 86, tachypneic with heart rate 24, blood pressure 151/75, and hypoxic with SpO2 78% on room air.  Patient was placed on BiPAP and most recent SpO2 at 92%.  Glucose 262, BUN 36/creatinine 2.61, BNP 2456, troponin at 0821 was 44, repeat at 0931 was 134.  WBC 9.9 with slight left shift.  Hemoglobin 12.7/hematocrit 40.7.  Chest x-ray shows cardiomegaly with perihilar vascular congestion (R>L) infiltrates and/or pleural effusions.    EKG (Interpreted by ED physician): sinus rhythm with first-degree AV block, rate 98, normal axis, ST elevation V1, T wave flattening in inferior lateral leads, motion artifact, abnormal EKG.     Echocardiogram, 12/3/23  ([facility]): Focally abnormal LV systolic function, LVEF is 55%, results in patient's chart.     Admitting provider is Dr. Kuo      Past Medical History  Past Medical History:   Diagnosis Date    Acute cystitis with hematuria 12/31/2016    Acute cystitis with hematuria    Personal history of other diseases of the circulatory system     History of hypertension    Personal history of other diseases of the circulatory system     History of cardiac disorder    Personal history of other endocrine, nutritional and metabolic disease     History of diabetic neuropathy    Personal history of other endocrine, nutritional and metabolic disease     History of hyperlipidemia    Personal history of other endocrine, nutritional and metabolic disease     History of type 2 diabetes mellitus    Personal history of other specified conditions     History of insomnia     Surgical History  Past Surgical History:   Procedure Laterality Date    OTHER SURGICAL HISTORY  09/17/2019    Bypass    OTHER SURGICAL HISTORY  09/17/2019    Arterial stent placement     Social History  Denies smoking, alcohol, drug use.    Family History  No family history on file.     Allergies  Patient has no known allergies.    Review of Systems  10 point review system negative except as noted above in HPI    Physical Exam  Constitutional:       Appearance: Normal appearance.   HENT:      Head: Normocephalic and atraumatic.      Mouth/Throat:      Mouth: Mucous membranes are moist.      Pharynx: Oropharynx is clear.   Eyes:      Extraocular Movements: Extraocular movements intact.      Conjunctiva/sclera: Conjunctivae normal.      Pupils: Pupils are equal, round, and reactive to light.   Cardiovascular:      Rate and Rhythm: Normal rate and regular rhythm.      Pulses: Normal pulses.      Heart sounds: Normal heart sounds.   Pulmonary:      Breath sounds: Normal breath sounds. No wheezing.      Comments: Tachypneic.  Accessory muscle use.  Patient currently on  BiPAP, SpO2 93%.  Abdominal:      General: Bowel sounds are normal.      Palpations: Abdomen is soft.      Tenderness: There is no abdominal tenderness.   Musculoskeletal:         General: No swelling. Normal range of motion.      Right lower leg: No edema.      Left lower leg: No edema.   Skin:     General: Skin is warm and dry.   Neurological:      General: No focal deficit present.      Mental Status: He is alert and oriented to person, place, and time.   Psychiatric:         Mood and Affect: Mood normal.         Behavior: Behavior normal.       Last Recorded Vitals  Blood pressure 141/79, pulse 75, temperature 36.1 °C (97 °F), temperature source Tympanic, resp. rate (!) 28, SpO2 (!) 92 %.    Relevant Results  Results for orders placed or performed during the hospital encounter of 03/27/24 (from the past 24 hour(s))   CBC and Auto Differential   Result Value Ref Range    WBC 9.9 4.4 - 11.3 x10*3/uL    nRBC 0.0 0.0 - 0.0 /100 WBCs    RBC 4.23 (L) 4.50 - 5.90 x10*6/uL    Hemoglobin 12.7 (L) 13.5 - 17.5 g/dL    Hematocrit 40.7 (L) 41.0 - 52.0 %    MCV 96 80 - 100 fL    MCH 30.0 26.0 - 34.0 pg    MCHC 31.2 (L) 32.0 - 36.0 g/dL    RDW 16.6 (H) 11.5 - 14.5 %    Platelets 246 150 - 450 x10*3/uL    Neutrophils % 60.7 40.0 - 80.0 %    Immature Granulocytes %, Automated 0.3 0.0 - 0.9 %    Lymphocytes % 31.2 13.0 - 44.0 %    Monocytes % 5.1 2.0 - 10.0 %    Eosinophils % 2.0 0.0 - 6.0 %    Basophils % 0.7 0.0 - 2.0 %    Neutrophils Absolute 6.03 (H) 1.60 - 5.50 x10*3/uL    Immature Granulocytes Absolute, Automated 0.03 0.00 - 0.50 x10*3/uL    Lymphocytes Absolute 3.10 (H) 0.80 - 3.00 x10*3/uL    Monocytes Absolute 0.51 0.05 - 0.80 x10*3/uL    Eosinophils Absolute 0.20 0.00 - 0.40 x10*3/uL    Basophils Absolute 0.07 0.00 - 0.10 x10*3/uL   Magnesium   Result Value Ref Range    Magnesium 1.81 1.60 - 2.40 mg/dL   Comprehensive metabolic panel   Result Value Ref Range    Glucose 262 (H) 74 - 99 mg/dL    Sodium 137 136 - 145  mmol/L    Potassium 4.8 3.5 - 5.3 mmol/L    Chloride 103 98 - 107 mmol/L    Bicarbonate 21 21 - 32 mmol/L    Anion Gap 18 10 - 20 mmol/L    Urea Nitrogen 36 (H) 6 - 23 mg/dL    Creatinine 2.61 (H) 0.50 - 1.30 mg/dL    eGFR 25 (L) >60 mL/min/1.73m*2    Calcium 9.7 8.6 - 10.3 mg/dL    Albumin 4.2 3.4 - 5.0 g/dL    Alkaline Phosphatase 58 33 - 136 U/L    Total Protein 7.8 6.4 - 8.2 g/dL    AST 19 9 - 39 U/L    Bilirubin, Total 0.7 0.0 - 1.2 mg/dL    ALT 14 10 - 52 U/L   Phosphorus   Result Value Ref Range    Phosphorus 4.3 2.5 - 4.9 mg/dL   Protime-INR   Result Value Ref Range    Protime 12.1 9.8 - 12.8 seconds    INR 1.1 0.9 - 1.1   aPTT   Result Value Ref Range    aPTT 28 27 - 38 seconds   B-Type Natriuretic Peptide   Result Value Ref Range    BNP 2,456 (H) 0 - 99 pg/mL   Type And Screen   Result Value Ref Range    ABO TYPE B     Rh TYPE POS     ANTIBODY SCREEN NEG    Troponin I, High Sensitivity, Initial   Result Value Ref Range    Troponin I, High Sensitivity 44 (H) 0 - 20 ng/L   Troponin, High Sensitivity, 1 Hour   Result Value Ref Range    Troponin I, High Sensitivity 134 (HH) 0 - 20 ng/L   POCT GLUCOSE   Result Value Ref Range    POCT Glucose 169 (H) 74 - 99 mg/dL      XR chest 1 view    Result Date: 3/27/2024  Interpreted By:  Gerda Falcon, STUDY: XR CHEST 1 VIEW;  3/27/2024 8:35 am   INDICATION: Signs/Symptoms:sob.   COMPARISON: None.   ACCESSION NUMBER(S): [id number]   ORDERING CLINICIAN: NAYAN FAN   FINDINGS: Artifact from overlying monitoring leads noted. Perihilar vascular congestion. Bibasilar opacities with blunting of the right-greater-than-left costophrenic angles. Cardiac silhouette is partially obscured but likely enlarged, status post sternotomy.       Cardiomegaly with perihilar vascular congestion and right-greater-than-left infiltrates and/or pleural effusions. Follow-up to ensure resolution after appropriate treatment recommended.   MACRO: None.   Signed by: Gerda Falcon 3/27/2024 8:39 AM  Dictation workstation:   [id number]       Assessment/Plan   Principal Problem:    Acute congestive heart failure, unspecified heart failure type (CMS/HCC)    Fredrick De La Paz is a 73 y.o. male presents to the ED today with complaints of shortness of breath. Says the shortness of breath has been worse when he is laying down and also when he is exerting himself.  States the shortness of breath became progressively worse this morning which is why he went to come to the ED for evaluation. Also reports mild chest pain and nausea.  Patient states he was recently at [facility] with the same issues and required a cardiac catheterization, stenting, and bilateral pleurocentesis.  Patient was placed on BiPAP in the ED, states his breathing feels much better now and his chest pain has almost resolved.     #Congestive heart failure  #Shortness of breath  #Hypoxia  #Elevated BNP  #Pulmonary edema/pleural effusions on chest x-ray  Admit as inpatient to stepdown unit with telemetry monitoring  Cardiology consult  Pulmonology consult, patient needed a recent thoracentesis at [facility]  Continue BiPAP, transition to oxygen via nasal cannula  IV Lasix daily  Pain medicine as needed  Zofran as needed  Cardiac/diabetic diet (no pork/beef due to Mu-ism)  CBC, BMP in a.m.  Q 4 vitals  PT/OT for evaluation and treatment  UA ordered   Echocardiogram ordered    Recent echo from 12/3/2023 from [facility], EF 55%.  Reaching out to cardiology about starting heparin drip for abnormal EKG and uptrending troponin.- Dr. Gross recommended starting patient on Heparin and will continue to follow.     #Elevated troponin  Troponin at 0821 was 44, repeat at 0931 was 134, repeat ordered-trend  Cardiology consult  Heparin started   324 mg aspirin, continue aspirin 81 mg daily  Continue home Plavix and Lipitor    #Diabetes mellitus  ACHS Accu-Checks  Sliding scale insulin  Lantus 10 units in the morning    #CKD  Monitor labs in the  a.m.    Continue at home medications as appropriate    Chronic conditions:  #Insomnia, HTN, HLD, CKD, type II DM, diabetic neuropathy    #DVT prophylaxis  Ambulation as tolerated, SCDs at night  Heparin, continuous        I spent 45 minutes in the professional and overall care of this patient.    Mike Ford PA-C    Addendum patient seen and evaluated agree with all the above management seen by cardiology and continue per the recommendations of Dr. Dempsey as well.  Will review his labs for the morning continue with the current above therapy and management thank you

## 2024-03-28 LAB
ANION GAP SERPL CALC-SCNC: 15 MMOL/L (ref 10–20)
BUN SERPL-MCNC: 41 MG/DL (ref 6–23)
CALCIUM SERPL-MCNC: 9.3 MG/DL (ref 8.6–10.3)
CHLORIDE SERPL-SCNC: 105 MMOL/L (ref 98–107)
CO2 SERPL-SCNC: 22 MMOL/L (ref 21–32)
CREAT SERPL-MCNC: 2.79 MG/DL (ref 0.5–1.3)
EGFRCR SERPLBLD CKD-EPI 2021: 23 ML/MIN/1.73M*2
ERYTHROCYTE [DISTWIDTH] IN BLOOD BY AUTOMATED COUNT: 16.6 % (ref 11.5–14.5)
GLUCOSE BLD MANUAL STRIP-MCNC: 124 MG/DL (ref 74–99)
GLUCOSE BLD MANUAL STRIP-MCNC: 156 MG/DL (ref 74–99)
GLUCOSE BLD MANUAL STRIP-MCNC: 158 MG/DL (ref 74–99)
GLUCOSE BLD MANUAL STRIP-MCNC: 177 MG/DL (ref 74–99)
GLUCOSE SERPL-MCNC: 150 MG/DL (ref 74–99)
HCT VFR BLD AUTO: 35.8 % (ref 41–52)
HGB BLD-MCNC: 11.7 G/DL (ref 13.5–17.5)
HOLD SPECIMEN: NORMAL
MCH RBC QN AUTO: 30.9 PG (ref 26–34)
MCHC RBC AUTO-ENTMCNC: 32.7 G/DL (ref 32–36)
MCV RBC AUTO: 95 FL (ref 80–100)
NRBC BLD-RTO: 0 /100 WBCS (ref 0–0)
PLATELET # BLD AUTO: 204 X10*3/UL (ref 150–450)
POTASSIUM SERPL-SCNC: 4.4 MMOL/L (ref 3.5–5.3)
RBC # BLD AUTO: 3.79 X10*6/UL (ref 4.5–5.9)
SODIUM SERPL-SCNC: 138 MMOL/L (ref 136–145)
WBC # BLD AUTO: 7.8 X10*3/UL (ref 4.4–11.3)

## 2024-03-28 PROCEDURE — 97165 OT EVAL LOW COMPLEX 30 MIN: CPT | Mod: GO

## 2024-03-28 PROCEDURE — 80048 BASIC METABOLIC PNL TOTAL CA: CPT | Performed by: PHYSICIAN ASSISTANT

## 2024-03-28 PROCEDURE — 2500000001 HC RX 250 WO HCPCS SELF ADMINISTERED DRUGS (ALT 637 FOR MEDICARE OP): Performed by: PHYSICIAN ASSISTANT

## 2024-03-28 PROCEDURE — 2500000002 HC RX 250 W HCPCS SELF ADMINISTERED DRUGS (ALT 637 FOR MEDICARE OP, ALT 636 FOR OP/ED): Performed by: PHYSICIAN ASSISTANT

## 2024-03-28 PROCEDURE — 97161 PT EVAL LOW COMPLEX 20 MIN: CPT | Mod: GP

## 2024-03-28 PROCEDURE — 2500000004 HC RX 250 GENERAL PHARMACY W/ HCPCS (ALT 636 FOR OP/ED): Performed by: INTERNAL MEDICINE

## 2024-03-28 PROCEDURE — 85027 COMPLETE CBC AUTOMATED: CPT | Performed by: PHYSICIAN ASSISTANT

## 2024-03-28 PROCEDURE — 2500000005 HC RX 250 GENERAL PHARMACY W/O HCPCS: Performed by: INTERNAL MEDICINE

## 2024-03-28 PROCEDURE — 94660 CPAP INITIATION&MGMT: CPT

## 2024-03-28 PROCEDURE — 82947 ASSAY GLUCOSE BLOOD QUANT: CPT

## 2024-03-28 PROCEDURE — 36415 COLL VENOUS BLD VENIPUNCTURE: CPT | Performed by: PHYSICIAN ASSISTANT

## 2024-03-28 PROCEDURE — 2060000001 HC INTERMEDIATE ICU ROOM DAILY

## 2024-03-28 RX ORDER — FUROSEMIDE 10 MG/ML
20 INJECTION INTRAMUSCULAR; INTRAVENOUS EVERY 12 HOURS
Status: DISCONTINUED | OUTPATIENT
Start: 2024-03-28 | End: 2024-03-30 | Stop reason: HOSPADM

## 2024-03-28 RX ADMIN — ATORVASTATIN CALCIUM 20 MG: 20 TABLET, FILM COATED ORAL at 08:08

## 2024-03-28 RX ADMIN — ISOSORBIDE MONONITRATE 60 MG: 60 TABLET, EXTENDED RELEASE ORAL at 08:08

## 2024-03-28 RX ADMIN — LOSARTAN POTASSIUM 25 MG: 25 TABLET, FILM COATED ORAL at 08:08

## 2024-03-28 RX ADMIN — INSULIN GLARGINE 10 UNITS: 100 INJECTION, SOLUTION SUBCUTANEOUS at 08:09

## 2024-03-28 RX ADMIN — CLOPIDOGREL BISULFATE 75 MG: 75 TABLET ORAL at 08:08

## 2024-03-28 RX ADMIN — METOPROLOL SUCCINATE 100 MG: 50 TABLET, EXTENDED RELEASE ORAL at 08:09

## 2024-03-28 RX ADMIN — Medication 1 TABLET: at 08:08

## 2024-03-28 RX ADMIN — ASPIRIN 81 MG: 81 TABLET, COATED ORAL at 08:08

## 2024-03-28 RX ADMIN — ZOLPIDEM TARTRATE 10 MG: 5 TABLET, COATED ORAL at 21:16

## 2024-03-28 RX ADMIN — PANTOPRAZOLE SODIUM 40 MG: 40 TABLET, DELAYED RELEASE ORAL at 08:08

## 2024-03-28 RX ADMIN — FUROSEMIDE 20 MG: 10 INJECTION, SOLUTION INTRAMUSCULAR; INTRAVENOUS at 21:16

## 2024-03-28 RX ADMIN — SPIRONOLACTONE 25 MG: 25 TABLET, FILM COATED ORAL at 08:08

## 2024-03-28 RX ADMIN — Medication: at 20:00

## 2024-03-28 RX ADMIN — FUROSEMIDE 20 MG: 10 INJECTION, SOLUTION INTRAMUSCULAR; INTRAVENOUS at 08:08

## 2024-03-28 RX ADMIN — INSULIN LISPRO 2 UNITS: 100 INJECTION, SOLUTION INTRAVENOUS; SUBCUTANEOUS at 14:02

## 2024-03-28 RX ADMIN — Medication: at 08:00

## 2024-03-28 ASSESSMENT — COGNITIVE AND FUNCTIONAL STATUS - GENERAL
WALKING IN HOSPITAL ROOM: A LITTLE
DRESSING REGULAR UPPER BODY CLOTHING: A LITTLE
WALKING IN HOSPITAL ROOM: A LITTLE
MOVING FROM LYING ON BACK TO SITTING ON SIDE OF FLAT BED WITH BEDRAILS: A LITTLE
MOVING TO AND FROM BED TO CHAIR: A LITTLE
DAILY ACTIVITIY SCORE: 20
MOVING TO AND FROM BED TO CHAIR: A LITTLE
CLIMB 3 TO 5 STEPS WITH RAILING: TOTAL
MOVING TO AND FROM BED TO CHAIR: A LITTLE
TOILETING: A LITTLE
DRESSING REGULAR LOWER BODY CLOTHING: A LITTLE
STANDING UP FROM CHAIR USING ARMS: A LITTLE
TOILETING: A LITTLE
MOBILITY SCORE: 20
HELP NEEDED FOR BATHING: A LITTLE
DAILY ACTIVITIY SCORE: 23
TOILETING: A LITTLE
DAILY ACTIVITIY SCORE: 23
WALKING IN HOSPITAL ROOM: A LITTLE
CLIMB 3 TO 5 STEPS WITH RAILING: A LITTLE
CLIMB 3 TO 5 STEPS WITH RAILING: A LITTLE
MOBILITY SCORE: 16
TURNING FROM BACK TO SIDE WHILE IN FLAT BAD: A LITTLE
MOBILITY SCORE: 20

## 2024-03-28 ASSESSMENT — PAIN SCALES - GENERAL
PAINLEVEL_OUTOF10: 0 - NO PAIN

## 2024-03-28 ASSESSMENT — PAIN - FUNCTIONAL ASSESSMENT: PAIN_FUNCTIONAL_ASSESSMENT: 0-10

## 2024-03-28 NOTE — NURSING NOTE
CHF Clinical Nurse Navigator Documentation    Congestive Heart Failure disease education was performed by the Clinical Nurse Navigator with a good understanding. Yes    CHF signs and symptoms discussed and when to call cardiologist?  Yes  Living With Heart Failure Education booklet?  No  Controlling Heart Failure at Home Education? Yes  CHF Education Teaching Tool? Yes  Home medication usage?  Yes  Nutrition Education? Yes, Low Sodium Diet  Fluid Restriction Education? Yes  Daily Weight Education? Yes, Daily Weight Log given  Cardiovascular Rehab Referral ordered?  No  Follow-up with Cardiologist after discharge education? Yes    Comments: Patient sees Dr. Varghese for all of his cardiac needs. Patient is very familiar with CHF and the signs and symptoms to monitor at home. Patient states he weighs himself daily and writes it down. Discussed Healthy at Home program and the benefits of 24/7 nursing support. Patient is very interested in program and Healthy at Home referral sent. Also discussed the benefits of seeing a heart failure specialist to manage symptoms in addition to Dr. Varghese and patient stated he was interested. Patient verbalized understanding.

## 2024-03-28 NOTE — CARE PLAN
The patient's goals for the shift include Maintain pulse ox > 92%    The clinical goals for the shift include To fell better

## 2024-03-28 NOTE — DISCHARGE INSTRUCTIONS
HEART FAILURE EDUCATION:  1. Weigh yourself daily and record on your weight log.  2. If you gain more than 2 or 3 pounds overnight, call your cardiologist.  3. Follow a low sodium diet. No more than 2000 mg in one day, or more than 650 mg per meal.  4. Limit total fluids to no more than 8 cups (or 2 liters) per day - this includes all fluids (water, coffee, juice, milk, tea, etc.)  5. Monitor your blood pressure daily and record on your weight log.  6. Call to schedule your follow-up appointments when you get home if they were not already scheduled for you.  7. Keep your follow-up appointments! Bring your weight log with you so the doctors can see your weight trend and blood pressure readings.  8. Be sure to  any new prescriptions and take them as directed. If unsure of the medications, be sure to call your cardiologist.  9. Stay as active as you can tolerate.   10. If you notice subtle change of symptoms (slight increase in swelling, slight shortness of breath, a new intolerance to laying flat, a new cough), be sure to call your cardiologist.

## 2024-03-28 NOTE — PROGRESS NOTES
03/28/24 1005   Discharge Planning   Living Arrangements Spouse/significant other   Support Systems Family members   Assistance Needed Independent, patient does drive. uses cane for ambulation   Type of Residence Private residence  (1st floor apartment)   Number of Stairs to Enter Residence 0   Patient expects to be discharged to: Home     Met with patient at bedside, introduced self and role on care transitions team. Admission assessment completed with patient. Address, insurance and contact information verified with patient. Patient states he is diabetic, checks his blood sugar at home, has working glucometer/supplies. PCP is Dr. Nydia De La Paz, last visit three weeks ago. PT/OT evaluation is pending.

## 2024-03-28 NOTE — CONSULTS
NEPHROLOGY CONSULTATION NOTE    DATE OF CONSULTATION:  03/28/24     Referring Physician: Shiela Gomez MD    REASON FOR CONSULT: CKD stage IV    HISTORY OF PRESENT ILLNESS:   Fredrick De La Paz is a 73 y.o. male presenting with shortness of breath of sudden onset.  Patient has history of heart failure with an EF of 25%.  He has remote history of CABG nearly 30 years ago.  He also had PCI to the left main to left circumflex in February of this year with PCI to VERONIQUE to LAD in March of this year.  His creatinine had been around 2-2.5 but according to the patient he sees Dr. Roa and he has had a creatinine in the 2.6 range before.  A nephrology consultation was requested because of his underlying CKD.    In regards to his CKD he has longstanding history of diabetes for nearly 30 years.  He denies any retinopathy.  He recently has had cardiac procedures when stents had to be placed and he was exposed to contrast.  He has had decompensated heart failure when he was admitted to [facility] and had to have thoracentesis.  He has heart back in December of this year his creatinine was in the 3.3-3.5 range.  He last saw Dr. Roa in December after recent episode of acute kidney injury when his creatinine peaked around 4.16.  Patient has longstanding history of hypertension.       PAST MEDICAL HISTORY:   1.  CKD stage IV  2.  Heart failure with low ejection fraction  3.  Hypertension  4.  Diabetes mellitus type 2  5.  Coronary artery disease  6.  Osteoarthritis  7.  History of peripheral arterial disease  8.  Dyslipidemia  9.  Peripheral neuropathy  10.  Carotid artery stenosis on the right side  11.  History of renal artery stenosis      SURGICAL HISTORY:   1.  Coronary artery bypass graft in 1997  2.  Coronary stent  3.  Renal artery stent  4.  Right femoropopliteal angioplasty in 2018  5.  Left knee arthroscopic surgery       SOCIAL HISTORY:   He reports that he has never smoked. He has never used smokeless tobacco.  He reports that he does not currently use alcohol. No history on file for drug use.  He currently lives alone but states that his 19-year-old daughter from his ex-wife sometimes lives with him.  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  Currently he does do online tutoring for anatomy students.    FAMILY HISTORY:  Brother with mitral valve replacement mother with diabetes    ALLERGIES:  Patient has no known allergies.    MEDICATIONS:     No current facility-administered medications on file prior to encounter.     Current Outpatient Medications on File Prior to Encounter   Medication Sig Dispense Refill    allopurinol (Zyloprim) 100 mg tablet Take 1 tablet (100 mg) by mouth once daily.      zolpidem (Ambien) 10 mg tablet Take 1 tablet (10 mg) by mouth once daily at bedtime.      aspirin 81 mg EC tablet Take 1 tablet (81 mg) by mouth once daily.      atorvastatin (Lipitor) 20 mg tablet Take 1 tablet (20 mg) by mouth once daily.      Basaglar KwikPen U-100 Insulin 100 unit/mL (3 mL) pen Inject 10 Units under the skin once daily in the morning.      clopidogrel (Plavix) 75 mg tablet Take 1 tablet (75 mg) by mouth once daily.      empagliflozin (Jardiance) 10 mg Take 1 tablet (10 mg) by mouth once daily. 30 tablet 2    insulin lispro (HumaLOG U-100 Insulin) 100 unit/mL injection Inject 0.15 mL (15 Units) under the skin 2 times a day with meals. Take as directed per insulin instructions. 9 mL 11    isosorbide mononitrate ER (Imdur) 60 mg 24 hr tablet Take 1 tablet (60 mg) by mouth once daily.      losartan (Cozaar) 25 mg tablet Take 1 tablet (25 mg) by mouth once daily.      metoprolol succinate XL (Toprol-XL) 100 mg 24 hr tablet Take 1 tablet (100 mg) by mouth once daily.      multivitamin tablet Take 1 tablet by mouth once daily.      pantoprazole (ProtoNix) 40 mg EC tablet Take 1 tablet (40 mg) by mouth once daily in the morning. Take before meals.      spironolactone  (Aldactone) 25 mg tablet Take 1 tablet (25 mg) by mouth once daily.      torsemide (Demadex) 20 mg tablet Take 1 tablet (20 mg) by mouth 2 times a day.      traZODone (Desyrel) 50 mg tablet Take 1 tablet (50 mg) by mouth as needed at bedtime for sleep for up to 7 days. 7 tablet 0    [DISCONTINUED] amLODIPine (Norvasc) 10 mg tablet Take 1 tablet (10 mg) by mouth once daily.      [DISCONTINUED] zolpidem (Ambien) 5 mg tablet Take 2 tablets (10 mg) by mouth.          Scheduled medications  [Held by provider] allopurinol, 100 mg, oral, Daily  aspirin, 81 mg, oral, Daily  atorvastatin, 20 mg, oral, Daily  clopidogrel, 75 mg, oral, Daily  furosemide, 20 mg, intravenous, q12h  insulin glargine, 10 Units, subcutaneous, q AM  insulin lispro, 0-10 Units, subcutaneous, TID with meals  isosorbide mononitrate ER, 60 mg, oral, Daily  losartan, 25 mg, oral, Daily  metoprolol succinate XL, 100 mg, oral, Daily  multivitamin with minerals, 1 tablet, oral, Daily  oxygen, , inhalation, Continuous - Inhalation  pantoprazole, 40 mg, oral, Daily before breakfast  spironolactone, 25 mg, oral, Daily  zolpidem, 10 mg, oral, Nightly      Continuous medications     PRN medications  PRN medications: acetaminophen **OR** acetaminophen **OR** acetaminophen, dextrose, dextrose, glucagon, glucagon, nitroglycerin, ondansetron **OR** ondansetron     REVIEW OF SYSTEMS:    No headache, no blurry vision, no fever or chills,  no chest pain, complains of shortness of breath, no nausea, no vomiting, no diarrhea, no constipation , no focal weakness, no burning urination, no leg swelling. Rest of the 10 point ROS is negative     PHYSICAL EXAM:    Visit Vitals  /61   Pulse 75   Temp 36.6 °C (97.9 °F)   Resp (!) 30        GENERAL: Awake and Alert, in no distress, Cooperative  EYES: EOMI,  no Pallor noted  HEENT: oral mucosa moist  NECK: supple  CHEST: no tachypnea, bilateral basal crackles, no wheeze  CVS: Sternotomy scar noted, S1, S2 heard, Regular Rate  and Rhythm, no murmurs, no rubs  ABDOMEN: soft, non tender, bowel sounds present, no distention  MSK: No joint effusion, no tenderness  NEURO: No focal deficit, moving all extremities, no tremor  EXT: no leg edema  PSYCH: normal affect         I&O 24HR    Intake/Output Summary (Last 24 hours) at 3/28/2024 1503  Last data filed at 3/27/2024 2127  Gross per 24 hour   Intake 0 ml   Output --   Net 0 ml       RESULTS:         Lab Results   Component Value Date    WBC 7.8 03/28/2024    HGB 11.7 (L) 03/28/2024    HCT 35.8 (L) 03/28/2024    MCV 95 03/28/2024     03/28/2024      Lab Results   Component Value Date    GLUCOSE 150 (H) 03/28/2024    CALCIUM 9.3 03/28/2024     03/28/2024    K 4.4 03/28/2024    CO2 22 03/28/2024     03/28/2024    BUN 41 (H) 03/28/2024    CREATININE 2.79 (H) 03/28/2024         === 03/27/24 ===    XR CHEST 1 VIEW    - Impression -  Cardiomegaly with perihilar vascular congestion and  right-greater-than-left infiltrates and/or pleural effusions.  Follow-up to ensure resolution after appropriate treatment  recommended.    MACRO:  None.    Signed by: Gerda Falcon 3/27/2024 8:39 AM  Dictation workstation:   [id number]     ASSESSMENT/ PLAN:     This is a 73 y.o. year old male who presents with shortness of breath and noted to have decompensated heart failure    1.  Mild acute kidney injury: Patient's creatinine currently is at 2.79.  Baseline creatinine appears to be around 2.5.  He has had several exposure to contrast in the recent past requiring stents to his coronaries.  He has heart failure with low EF.  He is on diuretics.  Came in with pulmonary edema and fluid overload.  Received IV diuresis.  Creatinine has gone up slightly to 2.79.  He is currently on Lasix 20 mg IV every 12 hours we can continue the same.    2.  CKD stage IV: Baseline creatinine around 2.5.  He has heart failure which has been progressively getting worse.  At home he takes Jardiance Aldactone losartan and  torsemide.  In December he had worsening of kidney function with creatinine climbing up to 4.  He has cardiorenal physiology but also may have underlying diabetic nephropathy.  His UA shows 2+ proteinuria.  He gets his care with Dr. Roa.    3.  Fluid overload: Patient with heart failure with low EF of 20 to 25%.  He is being challenged with IV diuresis.  We will see if his creatinine stays stable we may need to be aggressive with diuresis however he currently feels a lot better.      Thank you very much for allowing me to participate in the care of this patient.     This note was created using dragon dictation and may contain unintended errors    XIMENA OH MD    03/28/24

## 2024-03-28 NOTE — PROGRESS NOTES
Occupational Therapy    Evaluation    Patient Name: Fredrick De La Paz  MRN: [id number]  Today's Date: 3/28/2024  Time Calculation  Start Time: 0933  Stop Time: 0950  Time Calculation (min): 17 min    Assessment  IP OT Assessment  OT Assessment: Patient presents with a decline in functional status and would benefit from O.T. services at a moderate intensity to improve independence with ADLs, transfers & mobility.  Prognosis: Good  End of Session Patient Position: Up in chair, Alarm on (call light in reach)    Plan:  Treatment Interventions: ADL retraining, Functional transfer training, Endurance training, Neuromuscular reeducation, Compensatory technique education  OT Frequency: 3 times per week  OT Discharge Recommendations: Moderate intensity level of continued care  OT - OK to Discharge: Yes (from an O.T. standpoint)    Subjective     Current Problem:  1. Acute congestive heart failure, unspecified heart failure type (CMS/HCC)        2. Shortness of breath  Transthoracic Echo (TTE) Complete    Transthoracic Echo (TTE) Complete      3. Hypoxia        4. Elevated troponin  CANCELED: Case Request Cath Lab: Left Heart Cath    CANCELED: Case Request Cath Lab: Left Heart Cath    CANCELED: Cardiac Catheterization Procedure    CANCELED: Cardiac Catheterization Procedure      5. NSTEMI (non-ST elevated myocardial infarction) (CMS/HCC)  CANCELED: Case Request Cath Lab: Left Heart Cath    CANCELED: Case Request Cath Lab: Left Heart Cath    CANCELED: Cardiac Catheterization Procedure    CANCELED: Cardiac Catheterization Procedure          General:  General  Reason for Referral: OT eval & treat for ADLs/safety (Acute CHF, pulmonary edema/effusions)  Referred By: Mike Ford PA-C  Past Medical History Relevant to Rehab: 3/27/24: SOB   PMH: PCI VERONIQUE to LAD 3/13/2024, PCI left main to LCx 2/5/2024, ischemic cardiomyopathy, CABG, insomnia, HTN, HLD, CKD, type 2 diabetes mellitus, and diabetic neuropathy  Prior to Session  Communication: Bedside nurse  Patient Position Received: Bed, 2 rail up, Alarm on  General Comment: Per conference with RN, patient is medically stable for therapy eval    Precautions:  Precautions Comment: Activity: ambulate with assist as tolerated, cardiac, 4 L O2 via nasal cannula      Pain:  Pain Assessment  Pain Assessment: 0-10  Pain Score: 0 - No pain    Objective     Cognition:  Overall Cognitive Status:  (A & O x 2-3, stated [facility], cues for correct location. Questionable historian, patient giving inconsistent responses with regards to DME at home. Requires min verbal and tactile cues for safety/hand placement.)         Home Living:  Home Living Comments: Patient reports lives alone in apartment with elevator, goes to ex-wife's home daily which is one story home; Independent ADLs, transfers & mobility with cane. Has high toilet, stall shower with grab bar. May own wheeled walker and w/c; denies using O2 at home. Patient reports he drives and manages all IADLs independently.       ADL:  Grooming Assistance: Stand by  UE Dressing Assistance: Minimal  LE Dressing Assistance: Minimal  Toileting Assistance with Device: Minimal    Activity Tolerance:  Endurance: Decreased tolerance for upright activites (SOB with activity while on 4 L O2; SPO2 91-92%.)    Bed Mobility/Transfers:   Bed Mobility  Bed Mobility:  (SBA supine to sit)  Transfers  Transfer:  (CGA sit to stand from edge of bed)    Ambulation/Gait Training:  Functional Mobility  Functional Mobility Performed:  (min-mod assist for balance during mobility with wheeled walker; LOB during turn; fall risk. Limited distance secondary to impaired activity tolerance)    Sitting Balance:  Dynamic Sitting Balance  Dynamic Sitting-Balance:  (SBA with UE support)      Sensation:  Sensation Comment: Patient denies numbness/tingling    Coordination:  Movements are Fluid and Coordinated: Yes       Extremities:   RUE :  (AROM grossly 100 degrees shoulder  flexion, WFL elbow-wrist-hand;  MMT grossly 3-/5 proximally; 4-/5 distally)   LUE:  (AROM grossly 75 degrees shoulder flexion, WFL elbow-wrist-hand;  MMT grossly 3-/5 proximally, 4-/5 distally)    Outcome Measures: [facility] Daily Activity  Putting on and taking off regular lower body clothing: A little  Bathing (including washing, rinsing, drying): A little  Putting on and taking off regular upper body clothing: A little  Toileting, which includes using toilet, bedpan or urinal: A little  Taking care of personal grooming such as brushing teeth: None  Eating Meals: None  Daily Activity - Total Score: 20           EDUCATION:     Education Documentation  ADL Training, taught by Patti Solis, OT at 3/28/2024 12:17 PM.  Learner: Patient  Readiness: Acceptance  Method: Explanation, Demonstration  Response: Verbalizes Understanding, Needs Reinforcement    Education Comments  No comments found.        Goals:   Encounter Problems       Encounter Problems (Active)       OT Goals       Increase LE bathing dressing & toileting to supervision with adaptive equipment as needed.  (Progressing)       Start:  03/28/24    Expected End:  04/11/24            Tolerate 10-15 minutes light UE ROM/therex without resistance to promote increased activity tolerance for ADLs (Progressing)       Start:  03/28/24    Expected End:  04/11/24            Increase functional transfers to/from bed, chair & commode to supervision with DME for safety  (Progressing)       Start:  03/28/24    Expected End:  04/11/24            Increase dynamic stand balance to supervision with UE support to promote increased independence with ADL & functional transfers (Progressing)       Start:  03/28/24    Expected End:  04/11/24

## 2024-03-28 NOTE — PROGRESS NOTES
Fredrick De La Pza is a 73 y.o. male on day 1 of admission presenting with Acute congestive heart failure, unspecified heart failure type (CMS/HCC).      Subjective   Seen today using BiPAP from yesterday through the night.  Feels very good now cardiology recommendations much appreciated.  We will continue the Lasix dose is going to be reduced.  He does have infiltrates and pleural effusions bilaterally.  Going to consult with pulmonary regarding this result and see if there is anything further we have to do       Objective     Last Recorded Vitals  /61   Pulse 75   Temp 36.6 °C (97.9 °F)   Resp (!) 30   Wt 68.5 kg (151 lb 0.2 oz)   SpO2 93%   Intake/Output last 3 Shifts:    Intake/Output Summary (Last 24 hours) at 3/28/2024 1209  Last data filed at 3/27/2024 212  Gross per 24 hour   Intake 0 ml   Output --   Net 0 ml       Admission Weight  Weight: 68.5 kg (151 lb 0.2 oz) (03/27/24 1136)    Daily Weight  03/27/24 : 68.5 kg (151 lb 0.2 oz)    Image Results  ECG 12 lead  Sinus rhythm with 1st degree AV block with occasional Premature ventricular complexes  Left axis deviation  Anteroseptal infarct (cited on or before 27-MAR-2024)  Inferior injury pattern  Prolonged QT  ** ** ACUTE MI / STEMI ** **  Consider right ventricular involvement in acute inferior infarct  Abnormal ECG  When compared with ECG of 27-MAR-2024 16:27, (unconfirmed)  Premature ventricular complexes are now Present  Serial changes of evolving Anteroseptal infarct Present  ECG 12 lead  Sinus rhythm  Prolonged [place] interval  Probable anteroseptal infarct, recent  Review of systems he gets shortness of breath when he walks but is not short of breath at rest no chest pain no fever    Physical Exam  His lungs sound clear down in the bases his heart has a regular rate and rhythm his abdomen is soft is not distended or firm and he has no ankle edema his skin is all warm and dry he is awake and alert and oriented x 3  Relevant Results                Assessment/Plan   This patient currently has cardiac telemetry ordered; if you would like to modify or discontinue the telemetry order, click here to go to the orders activity to modify/discontinue the order.              Principal Problem:    Acute congestive heart failure, unspecified heart failure type (CMS/[facility])  Active Problems:    Elevated troponin    NSTEMI (non-ST elevated myocardial infarction) (CMS/[facility])    Pleural effusions and infiltrates    Continue with diuretics his creatinine now is up to 2.7, I am also going to ask nephrology to look at him because his creatinine is significantly rising.    May need a CT of his chest but I did put a pulmonary consult then we will see what pulmonary determines when they look at this continuing maintain his oxygen saturations he is BiPAP as needed continue with all other present care and therapy thank you              Jason Kuo, DO

## 2024-03-28 NOTE — PROGRESS NOTES
Physical Therapy    Physical Therapy Evaluation    Patient Name: Fredrick De La Paz  MRN: [id number]  Today's Date: 3/28/2024   Time Calculation  Start Time: 0934  Stop Time: 0949  Time Calculation (min): 15 min    Assessment/Plan   PT Assessment  End of Session Communication: Bedside nurse, Care Coordinator  End of Session Patient Position: Up in chair, Alarm on  IP OR SWING BED PT PLAN  Inpatient or Swing Bed: Inpatient  PT Plan  PT Frequency: 3 times per week  PT Discharge Recommendations: Low intensity level of continued care  PT Recommended Transfer Status: Assist x1  PT - OK to Discharge: Yes      Subjective   General Visit Information:  General  Reason for Referral: acute CHF,pulm edema/effusion  Referred By: JESUS Ford PA-C  Past Medical History Relevant to Rehab: htn,hld,dm w/ neuropathy,ckd,EF 25%,pci,cabg,3L home O2  Prior to Session Communication: Bedside nurse  Patient Position Received: Bed, 2 rail up, Alarm on  Home Living:  Home Living  Home Living Comments: Alternates between his apt(level entry/elevator)/senior apt and ex-wifes one story house/no entry stairs  Prior Level of Function:  Prior Function Per Pt/Caregiver Report  Level of [place]: Independent with ADLs and functional transfers  Ambulatory Assistance:  (Ind std cane)  Precautions:  Precautions  Medical Precautions: Cardiac precautions, Fall precautions, Oxygen therapy device and L/min (4L O2 NC)  Vital Signs:       Objective   Pain:  Pain Assessment  Pain Score: 0 - No pain  Cognition:  Cognition  Overall Cognitive Status: Within Functional Limits    General Assessments:    Functional Assessments:  Bed Mobility  Bed Mobility: Yes (SUP)    Transfers  Transfer: Yes (CGA)    Ambulation/Gait Training  Ambulation/Gait Training Performed: Yes (20 ft/wh walker, min-mod assist x 1)  Extremity/Trunk Assessments:    Outcome Measures:  [facility] Basic Mobility  Turning from your back to your side while in a flat bed without using bedrails: A  little  Moving from lying on your back to sitting on the side of a flat bed without using bedrails: A little  Moving to and from bed to chair (including a wheelchair): A little  Standing up from a chair using your arms (e.g. wheelchair or bedside chair): A little  To walk in hospital room: A little  Climbing 3-5 steps with railing: Total  Basic Mobility - Total Score: 16    Encounter Problems       Encounter Problems (Active)       PT Problem       bed mob       Start:  03/28/24    Expected End:  04/18/24       Ind bed mob         huff       Start:  03/28/24    Expected End:  04/18/24       Ind huff         gait       Start:  03/28/24    Expected End:  04/18/24       Ind approp device         There ex       Start:  03/28/24    Expected End:  04/18/24       10-30 reps LE's                Education Documentation  Mobility Training, taught by Manolo Jaimes, PT at 3/28/2024 12:22 PM.  Learner: Patient  Readiness: Acceptance  Method: Explanation  Response: Verbalizes Understanding    ADL Training, taught by Manolo Jaiems, PT at 3/28/2024 12:22 PM.  Learner: Patient  Readiness: Acceptance  Method: Explanation  Response: Verbalizes Understanding    Education Comments  No comments found.

## 2024-03-29 ENCOUNTER — APPOINTMENT (OUTPATIENT)
Dept: RADIOLOGY | Facility: HOSPITAL | Age: 74
DRG: 280 | End: 2024-03-29
Payer: MEDICARE

## 2024-03-29 LAB
ANION GAP SERPL CALC-SCNC: 13 MMOL/L (ref 10–20)
ATRIAL RATE: 98 BPM
BUN SERPL-MCNC: 44 MG/DL (ref 6–23)
CALCIUM SERPL-MCNC: 8.7 MG/DL (ref 8.6–10.3)
CHLORIDE SERPL-SCNC: 105 MMOL/L (ref 98–107)
CO2 SERPL-SCNC: 25 MMOL/L (ref 21–32)
CREAT SERPL-MCNC: 2.74 MG/DL (ref 0.5–1.3)
EGFRCR SERPLBLD CKD-EPI 2021: 23 ML/MIN/1.73M*2
GLUCOSE BLD MANUAL STRIP-MCNC: 119 MG/DL (ref 74–99)
GLUCOSE BLD MANUAL STRIP-MCNC: 155 MG/DL (ref 74–99)
GLUCOSE BLD MANUAL STRIP-MCNC: 169 MG/DL (ref 74–99)
GLUCOSE BLD MANUAL STRIP-MCNC: 187 MG/DL (ref 74–99)
GLUCOSE SERPL-MCNC: 105 MG/DL (ref 74–99)
HOLD SPECIMEN: NORMAL
P AXIS: 60 DEGREES
POTASSIUM SERPL-SCNC: 4 MMOL/L (ref 3.5–5.3)
PR INTERVAL: 219 MS
Q ONSET: 253 MS
QRS COUNT: 15 BEATS
QRS DURATION: 108 MS
QT INTERVAL: 372 MS
QTC CALCULATION(BAZETT): 476 MS
QTC FREDERICIA: 438 MS
R AXIS: 30 DEGREES
SODIUM SERPL-SCNC: 139 MMOL/L (ref 136–145)
T AXIS: 134 DEGREES
T OFFSET: 439 MS
VENTRICULAR RATE: 98 BPM

## 2024-03-29 PROCEDURE — 2500000004 HC RX 250 GENERAL PHARMACY W/ HCPCS (ALT 636 FOR OP/ED): Performed by: INTERNAL MEDICINE

## 2024-03-29 PROCEDURE — 71250 CT THORAX DX C-: CPT

## 2024-03-29 PROCEDURE — 82947 ASSAY GLUCOSE BLOOD QUANT: CPT

## 2024-03-29 PROCEDURE — 94660 CPAP INITIATION&MGMT: CPT

## 2024-03-29 PROCEDURE — 80048 BASIC METABOLIC PNL TOTAL CA: CPT | Performed by: INTERNAL MEDICINE

## 2024-03-29 PROCEDURE — 2500000002 HC RX 250 W HCPCS SELF ADMINISTERED DRUGS (ALT 637 FOR MEDICARE OP, ALT 636 FOR OP/ED): Performed by: PHYSICIAN ASSISTANT

## 2024-03-29 PROCEDURE — 2060000001 HC INTERMEDIATE ICU ROOM DAILY

## 2024-03-29 PROCEDURE — 71250 CT THORAX DX C-: CPT | Performed by: STUDENT IN AN ORGANIZED HEALTH CARE EDUCATION/TRAINING PROGRAM

## 2024-03-29 PROCEDURE — 2500000005 HC RX 250 GENERAL PHARMACY W/O HCPCS: Performed by: INTERNAL MEDICINE

## 2024-03-29 PROCEDURE — 36415 COLL VENOUS BLD VENIPUNCTURE: CPT | Performed by: INTERNAL MEDICINE

## 2024-03-29 PROCEDURE — 2500000001 HC RX 250 WO HCPCS SELF ADMINISTERED DRUGS (ALT 637 FOR MEDICARE OP): Performed by: PHYSICIAN ASSISTANT

## 2024-03-29 RX ADMIN — Medication: at 08:00

## 2024-03-29 RX ADMIN — PANTOPRAZOLE SODIUM 40 MG: 40 TABLET, DELAYED RELEASE ORAL at 08:35

## 2024-03-29 RX ADMIN — Medication 1 TABLET: at 08:35

## 2024-03-29 RX ADMIN — FUROSEMIDE 20 MG: 10 INJECTION, SOLUTION INTRAMUSCULAR; INTRAVENOUS at 20:16

## 2024-03-29 RX ADMIN — INSULIN GLARGINE 10 UNITS: 100 INJECTION, SOLUTION SUBCUTANEOUS at 08:36

## 2024-03-29 RX ADMIN — ASPIRIN 81 MG: 81 TABLET, COATED ORAL at 08:34

## 2024-03-29 RX ADMIN — ZOLPIDEM TARTRATE 10 MG: 5 TABLET, COATED ORAL at 22:26

## 2024-03-29 RX ADMIN — METOPROLOL SUCCINATE 100 MG: 50 TABLET, EXTENDED RELEASE ORAL at 08:36

## 2024-03-29 RX ADMIN — ATORVASTATIN CALCIUM 20 MG: 20 TABLET, FILM COATED ORAL at 08:35

## 2024-03-29 RX ADMIN — LOSARTAN POTASSIUM 25 MG: 25 TABLET, FILM COATED ORAL at 08:35

## 2024-03-29 RX ADMIN — FUROSEMIDE 20 MG: 10 INJECTION, SOLUTION INTRAMUSCULAR; INTRAVENOUS at 08:36

## 2024-03-29 RX ADMIN — ISOSORBIDE MONONITRATE 60 MG: 60 TABLET, EXTENDED RELEASE ORAL at 08:35

## 2024-03-29 RX ADMIN — INSULIN LISPRO 2 UNITS: 100 INJECTION, SOLUTION INTRAVENOUS; SUBCUTANEOUS at 12:12

## 2024-03-29 RX ADMIN — CLOPIDOGREL BISULFATE 75 MG: 75 TABLET ORAL at 08:35

## 2024-03-29 RX ADMIN — SPIRONOLACTONE 25 MG: 25 TABLET, FILM COATED ORAL at 08:34

## 2024-03-29 ASSESSMENT — COGNITIVE AND FUNCTIONAL STATUS - GENERAL
WALKING IN HOSPITAL ROOM: A LITTLE
MOBILITY SCORE: 20
MOVING TO AND FROM BED TO CHAIR: A LITTLE
TOILETING: A LITTLE
TOILETING: A LITTLE
HELP NEEDED FOR BATHING: A LITTLE
CLIMB 3 TO 5 STEPS WITH RAILING: A LITTLE
STANDING UP FROM CHAIR USING ARMS: A LITTLE
DAILY ACTIVITIY SCORE: 22
STANDING UP FROM CHAIR USING ARMS: A LITTLE
WALKING IN HOSPITAL ROOM: A LITTLE
DAILY ACTIVITIY SCORE: 23
MOVING TO AND FROM BED TO CHAIR: A LITTLE
CLIMB 3 TO 5 STEPS WITH RAILING: A LITTLE
MOBILITY SCORE: 20

## 2024-03-29 ASSESSMENT — PAIN SCALES - GENERAL
PAINLEVEL_OUTOF10: 0 - NO PAIN
PAINLEVEL_OUTOF10: 0 - NO PAIN

## 2024-03-29 ASSESSMENT — PAIN - FUNCTIONAL ASSESSMENT
PAIN_FUNCTIONAL_ASSESSMENT: 0-10
PAIN_FUNCTIONAL_ASSESSMENT: 0-10

## 2024-03-29 NOTE — PROGRESS NOTES
NEPHROLOGY PROGRESS NOTE    REASON FOR CONSULT: CKD stage IV with fluid overload    SUBJECTIVE:  Patient states that he is breathing better.  He is on 2-1/2 L of oxygen.  He was about to start walking with the help of an aide.  He denies any chest pain.  He has no leg swelling.  Appetite is good.  He would like to go home.      OBJECTIVE:    Visit Vitals  /66   Pulse 64   Temp 36.5 °C (97.7 °F)   Resp 25          Intake/Output Summary (Last 24 hours) at 3/29/2024 1143  Last data filed at 3/28/2024 1930  Gross per 24 hour   Intake --   Output 400 ml   Net -400 ml        General: Awake and Alert, In no distress, Cooperative  HEENT: Oral mucosa moist, EOMI, nasal cannula noted  NECK: Supple  CHEST: No crackles, no wheeze, no tachypnea  CVS: S1,S2 heard, no rubs, RRR  ABD: Soft, Non Tender, BS present  EXT: no edema    MEDICATION:    Scheduled medications  [Held by provider] allopurinol, 100 mg, oral, Daily  aspirin, 81 mg, oral, Daily  atorvastatin, 20 mg, oral, Daily  clopidogrel, 75 mg, oral, Daily  furosemide, 20 mg, intravenous, q12h  insulin glargine, 10 Units, subcutaneous, q AM  insulin lispro, 0-10 Units, subcutaneous, TID with meals  isosorbide mononitrate ER, 60 mg, oral, Daily  losartan, 25 mg, oral, Daily  metoprolol succinate XL, 100 mg, oral, Daily  multivitamin with minerals, 1 tablet, oral, Daily  oxygen, , inhalation, Continuous - Inhalation  pantoprazole, 40 mg, oral, Daily before breakfast  spironolactone, 25 mg, oral, Daily  zolpidem, 10 mg, oral, Nightly      Continuous medications     PRN medications  PRN medications: acetaminophen **OR** acetaminophen **OR** acetaminophen, dextrose, dextrose, glucagon, glucagon, nitroglycerin, ondansetron **OR** ondansetron     RESULTS:    Lab Results   Component Value Date    WBC 7.8 03/28/2024    HGB 11.7 (L) 03/28/2024    HCT 35.8 (L) 03/28/2024    MCV 95 03/28/2024     03/28/2024        Lab Results   Component Value Date    CREATININE 2.74 (H)  03/29/2024    BUN 44 (H) 03/29/2024     03/29/2024    K 4.0 03/29/2024     03/29/2024    CO2 25 03/29/2024        Radiology Imaging reviewed      ASSESSMENT/PLAN:     1.  USMAN: Mild.  Baseline creatinine around 2.5.  Current creatinine at 2.74.  This is likely due to necessary diuresis.  Overall volume status is improving.  He is on 2 and half liters of oxygen.  He has heart failure with low EF.  Continue current IV diuresis.  If he is going to be discharged he can be discharged on torsemide 40 mg daily.    2.  CKD stage IV: Patient has a baseline creatinine around 2.5.  Cardiorenal syndrome.  Has history of diabetic nephropathy.  Sees Dr. Roa.    3.  Fluid overload: Patient with heart failure with low EF.  He is on IV Lasix with improvement.        Thank You very much for allowing me to participate in the care of this Patient    This document was created using dragon dictation and may contain unintended error    Erik Rider MD   03/29/24

## 2024-03-29 NOTE — CARE PLAN
The patient's goals for the shift include safety and comfort    The clinical goals for the shift include safety and comfort

## 2024-03-29 NOTE — PROGRESS NOTES
Fredrick De La Paz is a 73 y.o. male on day 2 of admission presenting with Acute congestive heart failure, unspecified heart failure type (CMS/HCC).      Subjective   Seen today feeling better currently on 20 mg of Lasix every 12 hours IV monitoring his creatinine and his renal function very closely I did put nephrology on consult to monitor that and cardiology is managing his heart failure which is significant.       Objective     Last Recorded Vitals  /66   Pulse 72   Temp 36.5 °C (97.7 °F) (Temporal)   Resp 22   Wt 68.5 kg (151 lb 0.2 oz)   SpO2 100%   Intake/Output last 3 Shifts:    Intake/Output Summary (Last 24 hours) at 3/29/2024 1351  Last data filed at 3/28/2024 1930  Gross per 24 hour   Intake --   Output 400 ml   Net -400 ml       Admission Weight  Weight: 68.5 kg (151 lb 0.2 oz) (03/27/24 1136)    Daily Weight  03/27/24 : 68.5 kg (151 lb 0.2 oz)    Image Results  ECG 12 lead  Sinus rhythm  Prolonged NH interval  Probable anteroseptal infarct, recent    See ED provider note for full interpretation and clinical correlation  Confirmed by Criss Drummond ([id number]) on 3/29/2024 10:40:03 AM    Results from last 7 days   Lab Units 03/28/24  0520   WBC AUTO x10*3/uL 7.8   HEMOGLOBIN g/dL 11.7*   HEMATOCRIT % 35.8*   PLATELETS AUTO x10*3/uL 204      Results from last 7 days   Lab Units 03/29/24  0549   SODIUM mmol/L 139   POTASSIUM mmol/L 4.0   CHLORIDE mmol/L 105   CO2 mmol/L 25   BUN mg/dL 44*   CREATININE mg/dL 2.74*   GLUCOSE mg/dL 105*   CALCIUM mg/dL 8.7      Review of systems has shortness of breath only upon exertion no chest pain no fever at this time no swelling in his ankles    Physical Exam  Awake and alert and oriented x 3 skin is warm and dry lungs are diminished but clear down in his bases.  Heart has a regular rate and rhythm no murmurs gallops or rubs abdomen is soft is not distended or firm no clubbing or peripheral cyanosis nailbeds are pink no palmar erythema  Relevant  Results               Assessment/Plan   This patient currently has cardiac telemetry ordered; if you would like to modify or discontinue the telemetry order, click here to go to the orders activity to modify/discontinue the order.              Principal Problem:    Acute congestive heart failure, unspecified heart failure type (CMS/[facility])  Active Problems:    Elevated troponin    NSTEMI (non-ST elevated myocardial infarction) (CMS/[facility])    So far acute congestive heart failure on Lasix 20 mg IV every 12 hours continue with diuresis cardiology wants to challenge him for about 24 more hours into tomorrow I am also can order a CAT scan with no contrast of his chest he keeps insisting on a thoracentesis I do not know if he has enough pleural fluid to do that get a better idea with a CT of his chest no contrast I am not can use any contrast at this time obviously because of his renal function or dysfunction.    Very low EF monitoring his acute kidney injury on chronic kidney disease and he is feeling better may not need a thoracentesis medial send him home he can follow-up with his cardiologist and nephrologist after the holiday so we will discontinue we will get the CAT scan nevertheless thank you              Jason Kuo DO

## 2024-03-29 NOTE — PROGRESS NOTES
Cardiology Progress    Impression:  HFrEF.  Acute on chronic decompensated.  Ischemic cardiomyopathy.  EF 25%.  Non-STEMI  CAD.  Remote CABG.  PCI left main to LCx 2/5/2024.  PCI VERONIQUE to LAD 3/13/2024.  Advanced renal disease.  Baseline creatinine 2.0-2.5.  Hypertension  Hyperlipidemia  Uncontrolled diabetes  Plan:  Medical therapy for CAD.  Wean oxygen as tolerated  Continue IV Lasix another 24 hours  Can change to torsemide 40 daily at discharge.  Anticipate discharge over the weekend.  HPI:  Breathing better.  Ambulating on the unit.  No angina.  Still on some oxygen.  Meds:  Scheduled medications  [Held by provider] allopurinol, 100 mg, oral, Daily  aspirin, 81 mg, oral, Daily  atorvastatin, 20 mg, oral, Daily  clopidogrel, 75 mg, oral, Daily  furosemide, 20 mg, intravenous, q12h  insulin glargine, 10 Units, subcutaneous, q AM  insulin lispro, 0-10 Units, subcutaneous, TID with meals  isosorbide mononitrate ER, 60 mg, oral, Daily  losartan, 25 mg, oral, Daily  metoprolol succinate XL, 100 mg, oral, Daily  multivitamin with minerals, 1 tablet, oral, Daily  oxygen, , inhalation, Continuous - Inhalation  pantoprazole, 40 mg, oral, Daily before breakfast  spironolactone, 25 mg, oral, Daily  zolpidem, 10 mg, oral, Nightly      Continuous medications     PRN medications  PRN medications: acetaminophen **OR** acetaminophen **OR** acetaminophen, dextrose, dextrose, glucagon, glucagon, nitroglycerin, ondansetron **OR** ondansetron    Physical exam:  Vitals:    03/29/24 1138   BP: 112/66   Pulse: 72   Resp: 22   Temp: 36.5 °C (97.7 °F)   SpO2: 100%      No JVD.  Decreased breath sounds right base more than left.  No edema.  EKG:  Telemetry shows sinus rhythm.  Echo:  EF 25%.  Labs:  Lab Results   Component Value Date    WBC 7.8 03/28/2024    HGB 11.7 (L) 03/28/2024    HCT 35.8 (L) 03/28/2024     03/28/2024    CHOL 184 09/18/2019    TRIG 176 (H) 09/18/2019    HDL 47.1 09/18/2019    ALT 14 03/27/2024    AST 19  03/27/2024     03/29/2024    K 4.0 03/29/2024     03/29/2024    CREATININE 2.74 (H) 03/29/2024    BUN 44 (H) 03/29/2024    CO2 25 03/29/2024    TSH 1.90 09/18/2019    INR 1.1 03/27/2024    HGBA1C 8.0 (H) 12/03/2023     par

## 2024-03-29 NOTE — PROGRESS NOTES
03/29/24 0940   Patient Choice   Provider Choice list and CMS website (https://medicare.gov/care-compare#search) for post-acute Quality and Resource Measure Data were provided and reviewed with: Patient  (home health care list provided 3/29)     Met with patient at bedside to follow up on discharge plan. Spoke with patient regarding home health care. Patient agreeable to home health care at discharge. Patient provided with home health care list to review. Care transitions team will continue to follow for discharge planning.    Addendum 1445: Met with patient at bedside to follow up on home health care preference. Patient provided preference of [facility]. Referral sent to [facility] in [facility].

## 2024-03-30 VITALS
BODY MASS INDEX: 24.27 KG/M2 | DIASTOLIC BLOOD PRESSURE: 69 MMHG | SYSTOLIC BLOOD PRESSURE: 136 MMHG | OXYGEN SATURATION: 96 % | HEIGHT: 66 IN | TEMPERATURE: 96.8 F | WEIGHT: 151.01 LBS | RESPIRATION RATE: 20 BRPM | HEART RATE: 74 BPM

## 2024-03-30 LAB
ANION GAP SERPL CALC-SCNC: 14 MMOL/L (ref 10–20)
BUN SERPL-MCNC: 46 MG/DL (ref 6–23)
CALCIUM SERPL-MCNC: 8.4 MG/DL (ref 8.6–10.3)
CHLORIDE SERPL-SCNC: 102 MMOL/L (ref 98–107)
CO2 SERPL-SCNC: 25 MMOL/L (ref 21–32)
CREAT SERPL-MCNC: 2.61 MG/DL (ref 0.5–1.3)
EGFRCR SERPLBLD CKD-EPI 2021: 25 ML/MIN/1.73M*2
GLUCOSE BLD MANUAL STRIP-MCNC: 131 MG/DL (ref 74–99)
GLUCOSE BLD MANUAL STRIP-MCNC: 181 MG/DL (ref 74–99)
GLUCOSE SERPL-MCNC: 137 MG/DL (ref 74–99)
HOLD SPECIMEN: NORMAL
POTASSIUM SERPL-SCNC: 4.1 MMOL/L (ref 3.5–5.3)
SODIUM SERPL-SCNC: 137 MMOL/L (ref 136–145)

## 2024-03-30 PROCEDURE — 36415 COLL VENOUS BLD VENIPUNCTURE: CPT | Performed by: INTERNAL MEDICINE

## 2024-03-30 PROCEDURE — 2500000004 HC RX 250 GENERAL PHARMACY W/ HCPCS (ALT 636 FOR OP/ED): Performed by: INTERNAL MEDICINE

## 2024-03-30 PROCEDURE — 82947 ASSAY GLUCOSE BLOOD QUANT: CPT

## 2024-03-30 PROCEDURE — 94660 CPAP INITIATION&MGMT: CPT

## 2024-03-30 PROCEDURE — 2500000001 HC RX 250 WO HCPCS SELF ADMINISTERED DRUGS (ALT 637 FOR MEDICARE OP): Performed by: PHYSICIAN ASSISTANT

## 2024-03-30 PROCEDURE — 2500000002 HC RX 250 W HCPCS SELF ADMINISTERED DRUGS (ALT 637 FOR MEDICARE OP, ALT 636 FOR OP/ED): Performed by: PHYSICIAN ASSISTANT

## 2024-03-30 PROCEDURE — 82374 ASSAY BLOOD CARBON DIOXIDE: CPT | Performed by: INTERNAL MEDICINE

## 2024-03-30 PROCEDURE — 99233 SBSQ HOSP IP/OBS HIGH 50: CPT | Performed by: INTERNAL MEDICINE

## 2024-03-30 RX ADMIN — ATORVASTATIN CALCIUM 20 MG: 20 TABLET, FILM COATED ORAL at 09:04

## 2024-03-30 RX ADMIN — ISOSORBIDE MONONITRATE 60 MG: 60 TABLET, EXTENDED RELEASE ORAL at 09:04

## 2024-03-30 RX ADMIN — METOPROLOL SUCCINATE 100 MG: 50 TABLET, EXTENDED RELEASE ORAL at 09:03

## 2024-03-30 RX ADMIN — CLOPIDOGREL BISULFATE 75 MG: 75 TABLET ORAL at 09:03

## 2024-03-30 RX ADMIN — INSULIN GLARGINE 10 UNITS: 100 INJECTION, SOLUTION SUBCUTANEOUS at 08:55

## 2024-03-30 RX ADMIN — SPIRONOLACTONE 25 MG: 25 TABLET, FILM COATED ORAL at 09:04

## 2024-03-30 RX ADMIN — LOSARTAN POTASSIUM 25 MG: 25 TABLET, FILM COATED ORAL at 09:03

## 2024-03-30 RX ADMIN — PANTOPRAZOLE SODIUM 40 MG: 40 TABLET, DELAYED RELEASE ORAL at 09:04

## 2024-03-30 RX ADMIN — Medication 1 TABLET: at 09:04

## 2024-03-30 RX ADMIN — ASPIRIN 81 MG: 81 TABLET, COATED ORAL at 09:03

## 2024-03-30 RX ADMIN — FUROSEMIDE 20 MG: 10 INJECTION, SOLUTION INTRAMUSCULAR; INTRAVENOUS at 09:04

## 2024-03-30 ASSESSMENT — COGNITIVE AND FUNCTIONAL STATUS - GENERAL
MOVING TO AND FROM BED TO CHAIR: A LITTLE
DAILY ACTIVITIY SCORE: 23
MOBILITY SCORE: 20
HELP NEEDED FOR BATHING: A LITTLE
CLIMB 3 TO 5 STEPS WITH RAILING: A LITTLE
WALKING IN HOSPITAL ROOM: A LITTLE
STANDING UP FROM CHAIR USING ARMS: A LITTLE

## 2024-03-30 ASSESSMENT — PAIN - FUNCTIONAL ASSESSMENT: PAIN_FUNCTIONAL_ASSESSMENT: 0-10

## 2024-03-30 ASSESSMENT — PAIN SCALES - GENERAL: PAINLEVEL_OUTOF10: 0 - NO PAIN

## 2024-03-30 NOTE — PROGRESS NOTES
NEPHROLOGY PROGRESS NOTE    REASON FOR CONSULT: CKD stage IV with fluid overload    SUBJECTIVE:  Patient is sitting comfortably.  He had the CT chest which shows the moderate right pleural effusion.  He is currently oxygenating 100% on room air.  He is likely to be discharged home today.      OBJECTIVE:    Visit Vitals  /69 (BP Location: Right arm, Patient Position: Lying)   Pulse 74   Temp 36 °C (96.8 °F) (Temporal)   Resp 20          Intake/Output Summary (Last 24 hours) at 3/30/2024 1527  Last data filed at 3/30/2024 0816  Gross per 24 hour   Intake --   Output 1100 ml   Net -1100 ml          General: Awake and Alert, In no distress, Cooperative  HEENT: Oral mucosa moist, EOMI  NECK: Supple  CHEST: No crackles, no wheeze, no tachypnea  CVS: S1,S2 heard, no rubs, RRR  ABD: Soft, Non Tender, BS present  EXT: no edema    MEDICATION:    Scheduled medications  [Held by provider] allopurinol, 100 mg, oral, Daily  aspirin, 81 mg, oral, Daily  atorvastatin, 20 mg, oral, Daily  clopidogrel, 75 mg, oral, Daily  furosemide, 20 mg, intravenous, q12h  insulin glargine, 10 Units, subcutaneous, q AM  insulin lispro, 0-10 Units, subcutaneous, TID with meals  isosorbide mononitrate ER, 60 mg, oral, Daily  losartan, 25 mg, oral, Daily  metoprolol succinate XL, 100 mg, oral, Daily  multivitamin with minerals, 1 tablet, oral, Daily  oxygen, , inhalation, Continuous - Inhalation  pantoprazole, 40 mg, oral, Daily before breakfast  spironolactone, 25 mg, oral, Daily  zolpidem, 10 mg, oral, Nightly      Continuous medications     PRN medications  PRN medications: acetaminophen **OR** acetaminophen **OR** acetaminophen, dextrose, dextrose, glucagon, glucagon, nitroglycerin, ondansetron **OR** ondansetron     RESULTS:    Lab Results   Component Value Date    WBC 7.8 03/28/2024    HGB 11.7 (L) 03/28/2024    HCT 35.8 (L) 03/28/2024    MCV 95 03/28/2024     03/28/2024        Lab Results   Component Value Date    CREATININE 2.61  (H) 03/30/2024    BUN 46 (H) 03/30/2024     03/30/2024    K 4.1 03/30/2024     03/30/2024    CO2 25 03/30/2024        Radiology Imaging reviewed      ASSESSMENT/PLAN:     1.  USMAN: Mild.  Creatinine has improved today at 2.61.  Baseline creatinine around 2.5.  He has heart failure with low EF.  Stable for discharge.  Can be discharged on torsemide 40 mg daily.    2.  CKD stage IV: Patient has a baseline creatinine around 2.5.  Cardiorenal syndrome.  Has history of diabetic nephropathy.  Sees Dr. Roa.    3.  Fluid overload: Patient with heart failure with low EF.  Overall improvement on IV diuresis.  Still has a right pleural effusion which is moderate.  May need thoracentesis.  Can follow-up with cardiology.  Restart oral torsemide.      Thank You very much for allowing me to participate in the care of this Patient    This document was created using dragon dictation and may contain unintended error    Erik Rider MD   03/30/24

## 2024-03-30 NOTE — CARE PLAN
Problem: Skin  Goal: Prevent/minimize sheer/friction injuries  Outcome: Progressing  Flowsheets (Taken 3/30/2024 6131)  Prevent/minimize sheer/friction injuries:   HOB 30 degrees or less   Use pull sheet     Problem: Fall/Injury  Goal: Not fall by end of shift  Outcome: Progressing  Goal: Be free from injury by end of the shift  Outcome: Progressing     Problem: Respiratory  Goal: Minimal/no exertional discomfort or dyspnea this shift  Outcome: Progressing  Goal: No signs of respiratory distress (eg. Use of accessory muscles. Peds grunting)  Outcome: Progressing  Goal: Verbalize decreased shortness of breath this shift  Outcome: Progressing  Goal: Wean oxygen to maintain O2 saturation per order/standard this shift  Outcome: Progressing     The clinical goals for the shift include comfort & rest.  Uneventful night.

## 2024-03-30 NOTE — PROGRESS NOTES
Subjective Data:  Feels better with breathing improved    Overnight Events:    None eported     Objective Data:  Last Recorded Vitals:  Vitals:    03/30/24 0100 03/30/24 0319 03/30/24 0816 03/30/24 1226   BP:  144/67 150/70 136/69   BP Location:   Right arm    Patient Position:   Lying    Pulse:  74 83    Resp: 21 20 20    Temp:  36.2 °C (97.2 °F) 36.2 °C (97.2 °F)    TempSrc:   Temporal    SpO2:  95% 94%    Weight:       Height:           Last Labs:  CBC - 3/28/2024:  5:20 AM  7.8 11.7 204    35.8      CMP - 3/30/2024:  5:27 AM  8.4 7.8 19 --- 0.7   4.3 4.2 14 58      PTT - 3/27/2024:  8:21 AM  1.1   12.1 28     TROPHS   Date/Time Value Ref Range Status   03/27/2024 12:14 PM 1,075 0 - 20 ng/L Final     Comment:     Previous result verified on 3/27/2024 1015 on specimen/case [id number] called with component [facility] for procedure Troponin, High Sensitivity, 1 Hour with value 134 ng/L.   03/27/2024 09:31  0 - 20 ng/L Final   03/27/2024 08:21 AM 44 0 - 20 ng/L Final     BNP   Date/Time Value Ref Range Status   03/27/2024 08:21 AM 2,456 0 - 99 pg/mL Final     HGBA1C   Date/Time Value Ref Range Status   12/03/2023 12:47 PM 8.0 4.0 - 5.6 % Final   09/18/2019 01:17 PM 6.9 % Final     Comment:          Diagnosis of Diabetes-Adults   Non-Diabetic: < or = 5.6%   Increased risk for developing diabetes: 5.7-6.4%   Diagnostic of diabetes: > or = 6.5%  .       Monitoring of Diabetes                Age (y)     Therapeutic Goal (%)   Adults:          >18           <7.0   Pediatrics:    13-18           <7.5                   7-12           <8.0                   0- 6            7.5-8.5   American Diabetes Association. Diabetes Care 33(S1), Jan 2010.       VLDL   Date/Time Value Ref Range Status   09/18/2019 01:17 PM 35 0 - 40 mg/dL Final      Last I/O:  I/O last 3 completed shifts:  In: - (0 mL/kg)   Out: 1100 (16.1 mL/kg) [Urine:1100 (0.4 mL/kg/hr)]  Weight: 68.5 kg     Past Cardiology Tests (Last 3 Years):  EKG:  ECG 12  lead 03/27/2024 (Preliminary)      ECG 12 lead 03/27/2024    Echo:  Transthoracic Echo (TTE) Complete 03/27/2024    Ejection Fractions:  EF   Date/Time Value Ref Range Status   03/27/2024 03:24 PM 34 %      Cath:  No results found for this or any previous visit from the past 1095 days.    Stress Test:  No results found for this or any previous visit from the past 1095 days.    Cardiac Imaging:  No results found for this or any previous visit from the past 1095 days.      Inpatient Medications:  Scheduled medications   Medication Dose Route Frequency    [Held by provider] allopurinol  100 mg oral Daily    aspirin  81 mg oral Daily    atorvastatin  20 mg oral Daily    clopidogrel  75 mg oral Daily    furosemide  20 mg intravenous q12h    insulin glargine  10 Units subcutaneous q AM    insulin lispro  0-10 Units subcutaneous TID with meals    isosorbide mononitrate ER  60 mg oral Daily    losartan  25 mg oral Daily    metoprolol succinate XL  100 mg oral Daily    multivitamin with minerals  1 tablet oral Daily    oxygen   inhalation Continuous - Inhalation    pantoprazole  40 mg oral Daily before breakfast    spironolactone  25 mg oral Daily    zolpidem  10 mg oral Nightly     PRN medications   Medication    acetaminophen    Or    acetaminophen    Or    acetaminophen    dextrose    dextrose    glucagon    glucagon    nitroglycerin    ondansetron    Or    ondansetron     Continuous Medications   Medication Dose Last Rate       Physical Exam:  GEN: Frail, 72 yo wm in NAD  HEENT: Araumatic, normocephalic  NECK: No JVD  COR: Reg.  LUNGS: Clear  ABD: Soft  EXT: Edema     Assessment/Plan   1.) Acute on chronic HFrEF  2.) Cardiorenal syndraome  REC:  Diuretics per Nephrology  Peripheral IV 03/27/24 20 G Left Antecubital (Active)   Site Assessment Clean;Dry;Intact 03/30/24 0805   Dressing Status Clean;Dry;Occlusive 03/30/24 0805   Number of days: 3       Code Status:  Full Code    I spent 30 minutes in the professional and  overall care of this patient.        Garth Dominique MD

## 2024-03-30 NOTE — CARE PLAN
The clinical goals for the shift include comfort & rest.    Over the shift, the patient did make progress toward the following goals.

## 2024-03-30 NOTE — PROGRESS NOTES
Fredrick De La Paz is a 73 y.o. male on day 3 of admission presenting with Acute congestive heart failure, unspecified heart failure type (CMS/HCC).      Subjective   Seen today has no shortness of breath CAT scan completed shows right-sided pleural effusion otherwise he is stable if it is okay with renal cardiology has cleared him for discharge I will leave it up to the patient if he wants me to do anything with that but however he will have to wait until Tuesday to get that done he may want to go and just come as an outpatient to his own cardiologist and nephrologist see what nephrology says about it as well       Objective     Last Recorded Vitals  /69   Pulse 83   Temp 36.2 °C (97.2 °F) (Temporal)   Resp 20   Wt 68.5 kg (151 lb 0.2 oz)   SpO2 94%   Intake/Output last 3 Shifts:    Intake/Output Summary (Last 24 hours) at 3/30/2024 1311  Last data filed at 3/30/2024 0816  Gross per 24 hour   Intake --   Output 1100 ml   Net -1100 ml       Admission Weight  Weight: 68.5 kg (151 lb 0.2 oz) (03/27/24 1136)    Daily Weight  03/27/24 : 68.5 kg (151 lb 0.2 oz)    Image Results  CT chest wo IV contrast  Narrative: Interpreted By:  Jose Costa,   STUDY:  CT CHEST WO IV CONTRAST; 3/29/2024 5:39 pm      INDICATION:  Signs/Symptoms:assess pleural effusion sizes for possible thora.      COMPARISON:  None.      ACCESSION NUMBER(S):  [id number]      ORDERING CLINICIAN:  SALVADOR IBARRA      TECHNIQUE:  Helical CT imaging of the chest was performed without intravenous  contrast.      FINDINGS:          CHEST WALL, LOWER NECK AND VISUALIZED THYROID: Unremarkable.      MEDIASTINUM, AXILLA AND [place]: Few subcentimeter mediastinal lymph  nodes.      HEART: Mild cardiomegaly . No pericardial effusion      VESSELS: The unenhanced major vessels are grossly unremarkable.  Severe coronary artery calcifications      ESOPHAGUS: Normal in caliber.      LUNG, PLEURA, AND LARGE AIRWAYS: Moderate right greater than left  pleural  effusion with surrounding atelectasis. Left lower lobe  superior segment 0.3 cm pulmonary nodule. Few scattered tiny calcific  granulomas. Pneumothorax. Central airways are patent.      UPPER ABDOMEN: Trace cholelithiasis. Bilateral nonspecific adrenal  nodular thickening. Tiny calcific splenic granulomas.      BONES: Multilevel degenerative spine changes. Previous median  sternotomy      Impression: 1. Moderate right greater than left pleural effusion with surrounding  atelectasis.  2. Left lower lobe superior segment 0.3 cm pulmonary nodule. Please  see below guidelines for follow-up.  3. Mild cardiomegaly. Severe coronary artery calcifications.  4. Trace cholelithiasis. Bilateral nonspecific adrenal nodular  thickening.      Incidental Finding:  A non-calcified pulmonary nodule/multiple  non-calcified pulmonary nodules measuring less than 6 mm, likely  benign.  (**-YCF-**)      Instructions:  No further follow-up is required, however, if the  patient has high risk factors for primary lung malignancy, follow-up  noncontrast CT scan chest in 12 months may be obtained. (Hadley Brownlee et al., Guidelines for management of incidental pulmonary  nodules detected on CT images: From the Fleischner Society 2017,  Radiology. 2017 Jul;284 (1):228-243.) [id number]      Signed by: Jose Costa 3/30/2024 11:23 AM  Dictation workstation:   [id number]    Results from last 7 days   Lab Units 03/28/24  0520   WBC AUTO x10*3/uL 7.8   HEMOGLOBIN g/dL 11.7*   HEMATOCRIT % 35.8*   PLATELETS AUTO x10*3/uL 204      Results from last 7 days   Lab Units 03/30/24  0527   SODIUM mmol/L 137   POTASSIUM mmol/L 4.1   CHLORIDE mmol/L 102   CO2 mmol/L 25   BUN mg/dL 46*   CREATININE mg/dL 2.61*   GLUCOSE mg/dL 137*   CALCIUM mg/dL 8.4*      Review of systems no shortness of breath no chest pain no swelling    Physical Exam  Lungs are diminished but clear heart has regular rate and rhythm abdomen soft is not distended or firm no  peripheral edema is present  Relevant Results               Assessment/Plan   This patient currently has cardiac telemetry ordered; if you would like to modify or discontinue the telemetry order, click here to go to the orders activity to modify/discontinue the order.              Principal Problem:    Acute congestive heart failure, unspecified heart failure type (CMS/[facility])  Active Problems:    Elevated troponin    NSTEMI (non-ST elevated myocardial infarction) (CMS/[facility])    So far congestive heart failure chronic and he has chronic renal insufficiency chronic kidney disease wants to stay it for a thoracentesis his saturations are good I do not believe he needs to cardiology does not feel that he needs to stay as well he can follow-up as an outpatient I will talk to nephrology if they feel he can follow-up with his nephrologist as well as his cardiologist I will go ahead and discharge him today thank you              Jason Kuo DO

## 2024-04-01 LAB
ATRIAL RATE: 96 BPM
P AXIS: 61 DEGREES
P OFFSET: 130 MS
P ONSET: 106 MS
PR INTERVAL: 216 MS
Q ONSET: 214 MS
QRS COUNT: 14 BEATS
QRS DURATION: 82 MS
QT INTERVAL: 652 MS
QTC CALCULATION(BAZETT): 757 MS
QTC FREDERICIA: 720 MS
R AXIS: -65 DEGREES
T AXIS: 68 DEGREES
T OFFSET: 540 MS
VENTRICULAR RATE: 81 BPM

## 2024-04-02 ENCOUNTER — PATIENT OUTREACH (OUTPATIENT)
Dept: CARE COORDINATION | Facility: CLINIC | Age: 74
End: 2024-04-02
Payer: MEDICARE

## 2024-04-02 NOTE — PROGRESS NOTES
Discharge Facility:[facility]  Discharge Diagnosis:  Acute congestive heart failure, unspecified heart failure type   Shortness of breath  Hypoxia  Elevated troponin  NSTEMI     Admission Date:3/27/2024  Discharge Date: 3/30/2024    PCP Appointment Date:4/4/2024  Specialist Appointment Date:   4/11/2024 Cardio/[place]  4/24/2024 Vascular Surgeon/[place]   Hospital Encounter and Summary: Linked

## 2024-04-04 ENCOUNTER — OFFICE VISIT (OUTPATIENT)
Dept: PRIMARY CARE | Facility: CLINIC | Age: 74
End: 2024-04-04
Payer: MEDICARE

## 2024-04-04 VITALS
DIASTOLIC BLOOD PRESSURE: 78 MMHG | SYSTOLIC BLOOD PRESSURE: 116 MMHG | WEIGHT: 152 LBS | BODY MASS INDEX: 24.55 KG/M2 | HEART RATE: 76 BPM | OXYGEN SATURATION: 100 %

## 2024-04-04 DIAGNOSIS — I25.810 ATHEROSCLEROSIS OF CORONARY ARTERY BYPASS GRAFT OF NATIVE HEART WITHOUT ANGINA PECTORIS: ICD-10-CM

## 2024-04-04 DIAGNOSIS — N18.30 STAGE 3 CHRONIC KIDNEY DISEASE, UNSPECIFIED WHETHER STAGE 3A OR 3B CKD (MULTI): ICD-10-CM

## 2024-04-04 DIAGNOSIS — Z76.89 ENCOUNTER FOR SUPPORT AND COORDINATION OF TRANSITION OF CARE: Primary | ICD-10-CM

## 2024-04-04 DIAGNOSIS — Z79.4 TYPE 2 DIABETES MELLITUS WITH CHRONIC KIDNEY DISEASE, WITH LONG-TERM CURRENT USE OF INSULIN, UNSPECIFIED CKD STAGE (MULTI): ICD-10-CM

## 2024-04-04 DIAGNOSIS — I50.42 CHRONIC COMBINED SYSTOLIC AND DIASTOLIC CONGESTIVE HEART FAILURE (MULTI): ICD-10-CM

## 2024-04-04 DIAGNOSIS — E11.22 TYPE 2 DIABETES MELLITUS WITH CHRONIC KIDNEY DISEASE, WITH LONG-TERM CURRENT USE OF INSULIN, UNSPECIFIED CKD STAGE (MULTI): ICD-10-CM

## 2024-04-04 PROCEDURE — 1111F DSCHRG MED/CURRENT MED MERGE: CPT | Performed by: STUDENT IN AN ORGANIZED HEALTH CARE EDUCATION/TRAINING PROGRAM

## 2024-04-04 PROCEDURE — 99496 TRANSJ CARE MGMT HIGH F2F 7D: CPT | Performed by: STUDENT IN AN ORGANIZED HEALTH CARE EDUCATION/TRAINING PROGRAM

## 2024-04-04 PROCEDURE — 3078F DIAST BP <80 MM HG: CPT | Performed by: STUDENT IN AN ORGANIZED HEALTH CARE EDUCATION/TRAINING PROGRAM

## 2024-04-04 PROCEDURE — 1036F TOBACCO NON-USER: CPT | Performed by: STUDENT IN AN ORGANIZED HEALTH CARE EDUCATION/TRAINING PROGRAM

## 2024-04-04 PROCEDURE — 1159F MED LIST DOCD IN RCRD: CPT | Performed by: STUDENT IN AN ORGANIZED HEALTH CARE EDUCATION/TRAINING PROGRAM

## 2024-04-04 PROCEDURE — 1160F RVW MEDS BY RX/DR IN RCRD: CPT | Performed by: STUDENT IN AN ORGANIZED HEALTH CARE EDUCATION/TRAINING PROGRAM

## 2024-04-04 PROCEDURE — 3074F SYST BP LT 130 MM HG: CPT | Performed by: STUDENT IN AN ORGANIZED HEALTH CARE EDUCATION/TRAINING PROGRAM

## 2024-04-04 RX ORDER — INSULIN LISPRO 100 [IU]/ML
17 INJECTION, SOLUTION INTRAVENOUS; SUBCUTANEOUS
Qty: 10.2 ML | Refills: 11 | Status: SHIPPED | OUTPATIENT
Start: 2024-04-04 | End: 2025-04-04

## 2024-04-04 RX ORDER — SACUBITRIL AND VALSARTAN 24; 26 MG/1; MG/1
1 TABLET, FILM COATED ORAL 2 TIMES DAILY
Qty: 60 TABLET | Refills: 0 | Status: SHIPPED | OUTPATIENT
Start: 2024-04-04 | End: 2024-04-11 | Stop reason: SDUPTHER

## 2024-04-04 RX ORDER — AMLODIPINE BESYLATE 10 MG/1
10 TABLET ORAL DAILY
COMMUNITY

## 2024-04-04 NOTE — PROGRESS NOTES
Subjective   Patient ID: Fredrick De La Paz is a 73 y.o. male who presents for follow up after his recent hospital admission with CHF exacerbation    AMW: 3/12/24; see chart   Annual Physical to be done in 3 months     Assessment/Plan ==  Preventative Medicine  -UTD on vaccines   -Had colonoscopy done recently per patient. Results to be brought  -PSA checked by previous PCP. States that it was normal but has a hx of BPH.   -Does not want labs today as he recently had labs done by his cardiology team.   -PHQ2: 0  -Alcohol screening done    Transition of Care   CHF/HFrEF (EF 25%)  CAD  -Echo on 3/27/24 shows EF 25%  -Currently on GDMT  -Has frequent exacerbations  -Had cardiac cath done last week but no intervention     PLAN  -Stop losartan and start entresto 26/24. Samples given   -Continue torsemide, jardiance, metoprolol, spironolactone   -Refer to EP for AICD   -Follow up with cardiology  -Declining cardiac rehab     T2DM  -Regarding his DM, he is on Basalglar 10 units in the morning, Humalog 15 units with each meal (as his insurance covers humalog but not novolog). Last A1c was 8.0 while at [facility]. He is compliant with home medications at this time. States that he eats a high carb diet. Advised patient to decrease carb intake  -Will increase humalog to 17U BIDWM and basaglar to 12U qAM.     CKD-III  -Entresto  -Jardiance  -Avoid nephrotoxic medications     Insomnia   -Continue ambien  -Continue trazodone    HTN  -Controlled  -Continue entresto, norvasc and metoprolol   -Low salt diet     OARRS Report Reviewed and appropriate.  UDS reviewed.  I have personally reviewed the OARRS report.  I have considered the risks of abuse, dependence, addiction and diversion. I believe that it is clinically appropriate for this patient to be prescribed this medication based on documented diagnosis.     Controlled Substance Agreement:   I have printed this form and reviewed each line item with the patient and the patient has  verbalized understanding.   Date of the last Controlled Substance Agreement: 2/27/24  Date of UDS To be obtained    I discussed patient's OARRS report as well as the potential concern for overdose on prescribed opioid, sleep aid, gabapentin/Lyrica, and/or benzodiazepines. I explained that Overdose Risk Scores >460, require a Narcan prescription and places the patient at risk for potential death.      Patient understands that the risk of death can occur when using opioid, benzodiazepines, gabapentin, Lyrica, sleep aids, and illegal drugs. The risk of death especially increases when using the above medications and or illegal drugs in combination. I have reviewed with the patient and the patient is in agreement with the terms of the  Controlled Substance Agreement.      At this point in time, patient is in compliance with the above, and has no signs of dependence or addiction to the above prescribed medications.          HPI  73M presents for Transition of care appointment.  He was admitted to [facility] from 3/27 to 3/30/2024.  Patient went to [place] for evaluation of shortness of breath.  Was found to be in acute CHF exacerbation and was treated with IV diuresis.  Stay was complicated by cardiorenal syndrome and nephrology was consulted.  Patient also underwent echocardiogram during that hospitalization and was found an EF of 25%.  He also underwent diagnostic cath but no intervention was done.  Once his shortness of breath improved after diuresis patient was discharged home.  Currently in office patient does not have any shortness of breath is not requiring any oxygenation.  Has minimal bilateral lower extremity edema relatively unchanged from his baseline.  No other complaints or concerns at this time    Plan of care discussed to increase insulin due to A1c being 8.0 during his hospitalization.  He is agreeable with this plan of care will monitor his blood glucose levels.  Also advised him to decrease carbohydrate  intake.    Lastly we discussed optimization of patient's cardiology medications.  Patient is currently on losartan but has never taken Entresto.  Will change from losartan to Entresto 24/26.  Also referred patient to cardiology for possible AICD implant due to EF less than 35%.    All complaints and concerns addressed.    Denies fevers, chills, weight loss, lightheadedness, dizziness, vision changes, sore throat, runny nose, CP, SOB, cough, palpitations, n/v/d, abd pain, black/bloody stools, arthralgias, or new numbness/weakness/tingling in arms/legs/face.        PMH: CKD, HFrEF, CAD, Insomnia, HTN, T2DM    Social hx: Non smoker, denies Etoh drinking, no drugs, he med school lecturer, and is still working online part time      Denies fevers, chills, weight loss, lightheadedness, dizziness, vision changes, sore throat, runny nose, CP, cough, palpitations, n/v/d, abd pain, black/bloody stools, arthralgias, or new numbness/weakness/tingling in arms/legs/face.        Visit Vitals  /78   Pulse 76   Wt 68.9 kg (152 lb)   SpO2 100%   BMI 24.55 kg/m²   Smoking Status Never   BSA 1.79 m²     PHYSICAL EXAM     Physical Exam     Visit Vitals  /78   Pulse 76   Wt 68.9 kg (152 lb)   SpO2 100%   BMI 24.55 kg/m²   Smoking Status Never   BSA 1.79 m²        General: NAD. NCAT. Aox3   HEENT: PERRLA. EOMI. MMM. Nares patent bl.  Cardiovascular: RRR. S1/S2 wnl. No JVD.   Respiratory: CTABL. No acute respiratory distress.   GI: Soft, NT abdomen.   MSK: ROM x 4. Generalized weakness.   Extremities: Trace BLLE edema; baseline.   Skin: No rashes or bruises.   Neuro: Aox3. Cranial Nerves grossly intact. Motor/sensory wnl.   Psych: Mood wnl.       REVIEW OF SYSTEMS     ROS in HPI   No Known Allergies    Current Outpatient Medications   Medication Sig Dispense Refill    allopurinol (Zyloprim) 100 mg tablet Take 1 tablet (100 mg) by mouth once daily.      amLODIPine (Norvasc) 10 mg tablet Take 1 tablet (10 mg) by mouth once daily.       aspirin 81 mg EC tablet Take 1 tablet (81 mg) by mouth once daily.      atorvastatin (Lipitor) 20 mg tablet Take 1 tablet (20 mg) by mouth once daily.      Basaglar KwikPen U-100 Insulin 100 unit/mL (3 mL) pen Inject 10 Units under the skin once daily in the morning.      clopidogrel (Plavix) 75 mg tablet Take 1 tablet (75 mg) by mouth once daily.      empagliflozin (Jardiance) 10 mg Take 1 tablet (10 mg) by mouth once daily. 30 tablet 2    insulin lispro (HumaLOG U-100 Insulin) 100 unit/mL injection Inject 0.15 mL (15 Units) under the skin 2 times a day with meals. Take as directed per insulin instructions. 9 mL 11    losartan (Cozaar) 25 mg tablet Take 1 tablet (25 mg) by mouth once daily.      metoprolol succinate XL (Toprol-XL) 100 mg 24 hr tablet Take 1 tablet (100 mg) by mouth once daily.      multivitamin tablet Take 1 tablet by mouth once daily.      pantoprazole (ProtoNix) 40 mg EC tablet Take 1 tablet (40 mg) by mouth once daily in the morning. Take before meals.      spironolactone (Aldactone) 25 mg tablet Take 1 tablet (25 mg) by mouth once daily.      torsemide (Demadex) 20 mg tablet Take 1 tablet (20 mg) by mouth 2 times a day.      zolpidem (Ambien) 10 mg tablet Take 1 tablet (10 mg) by mouth once daily at bedtime.      isosorbide mononitrate ER (Imdur) 60 mg 24 hr tablet Take 1 tablet (60 mg) by mouth once daily.      traZODone (Desyrel) 50 mg tablet Take 1 tablet (50 mg) by mouth as needed at bedtime for sleep for up to 7 days. 7 tablet 0     No current facility-administered medications for this visit.       Objective     Admission on 03/27/2024, Discharged on 03/30/2024   Component Date Value Ref Range Status    Ventricular Rate 03/27/2024 98  BPM Final    Atrial Rate 03/27/2024 98  BPM Final    MI Interval 03/27/2024 219  ms Final    QRS Duration 03/27/2024 108  ms Final    QT Interval 03/27/2024 372  ms Final    QTC Calculation(Bazett) 03/27/2024 476  ms Final    P Axis 03/27/2024 60  degrees  Final    R Axis 03/27/2024 30  degrees Final    T Axis 03/27/2024 134  degrees Final    QRS Count 03/27/2024 15  beats Final    Q Onset 03/27/2024 253  ms Final    T Offset 03/27/2024 439  ms Final    QTC Fredericia 03/27/2024 438  ms Final    WBC 03/27/2024 9.9  4.4 - 11.3 x10*3/uL Final    nRBC 03/27/2024 0.0  0.0 - 0.0 /100 WBCs Final    RBC 03/27/2024 4.23 (L)  4.50 - 5.90 x10*6/uL Final    Hemoglobin 03/27/2024 12.7 (L)  13.5 - 17.5 g/dL Final    Hematocrit 03/27/2024 40.7 (L)  41.0 - 52.0 % Final    MCV 03/27/2024 96  80 - 100 fL Final    MCH 03/27/2024 30.0  26.0 - 34.0 pg Final    MCHC 03/27/2024 31.2 (L)  32.0 - 36.0 g/dL Final    RDW 03/27/2024 16.6 (H)  11.5 - 14.5 % Final    Platelets 03/27/2024 246  150 - 450 x10*3/uL Final    Neutrophils % 03/27/2024 60.7  40.0 - 80.0 % Final    Immature Granulocytes %, Automated 03/27/2024 0.3  0.0 - 0.9 % Final    Lymphocytes % 03/27/2024 31.2  13.0 - 44.0 % Final    Monocytes % 03/27/2024 5.1  2.0 - 10.0 % Final    Eosinophils % 03/27/2024 2.0  0.0 - 6.0 % Final    Basophils % 03/27/2024 0.7  0.0 - 2.0 % Final    Neutrophils Absolute 03/27/2024 6.03 (H)  1.60 - 5.50 x10*3/uL Final    Immature Granulocytes Absolute, Au* 03/27/2024 0.03  0.00 - 0.50 x10*3/uL Final    Lymphocytes Absolute 03/27/2024 3.10 (H)  0.80 - 3.00 x10*3/uL Final    Monocytes Absolute 03/27/2024 0.51  0.05 - 0.80 x10*3/uL Final    Eosinophils Absolute 03/27/2024 0.20  0.00 - 0.40 x10*3/uL Final    Basophils Absolute 03/27/2024 0.07  0.00 - 0.10 x10*3/uL Final    Magnesium 03/27/2024 1.81  1.60 - 2.40 mg/dL Final    Glucose 03/27/2024 262 (H)  74 - 99 mg/dL Final    Sodium 03/27/2024 137  136 - 145 mmol/L Final    Potassium 03/27/2024 4.8  3.5 - 5.3 mmol/L Final    Chloride 03/27/2024 103  98 - 107 mmol/L Final    Bicarbonate 03/27/2024 21  21 - 32 mmol/L Final    Anion Gap 03/27/2024 18  10 - 20 mmol/L Final    Urea Nitrogen 03/27/2024 36 (H)  6 - 23 mg/dL Final    Creatinine 03/27/2024 2.61 (H)   0.50 - 1.30 mg/dL Final    eGFR 03/27/2024 25 (L)  >60 mL/min/1.73m*2 Final    Calcium 03/27/2024 9.7  8.6 - 10.3 mg/dL Final    Albumin 03/27/2024 4.2  3.4 - 5.0 g/dL Final    Alkaline Phosphatase 03/27/2024 58  33 - 136 U/L Final    Total Protein 03/27/2024 7.8  6.4 - 8.2 g/dL Final    AST 03/27/2024 19  9 - 39 U/L Final    Bilirubin, Total 03/27/2024 0.7  0.0 - 1.2 mg/dL Final    ALT 03/27/2024 14  10 - 52 U/L Final    Phosphorus 03/27/2024 4.3  2.5 - 4.9 mg/dL Final    Protime 03/27/2024 12.1  9.8 - 12.8 seconds Final    INR 03/27/2024 1.1  0.9 - 1.1 Final    aPTT 03/27/2024 28  27 - 38 seconds Final    BNP 03/27/2024 2,456 (H)  0 - 99 pg/mL Final    ABO TYPE 03/27/2024 B   Final    Rh TYPE 03/27/2024 POS   Final    ANTIBODY SCREEN 03/27/2024 NEG   Final    Troponin I, High Sensitivity 03/27/2024 44 (H)  0 - 20 ng/L Final    Troponin I, High Sensitivity 03/27/2024 134 (HH)  0 - 20 ng/L Final    Troponin I, High Sensitivity 03/27/2024 1,075 (HH)  0 - 20 ng/L Final    POCT Glucose 03/27/2024 169 (H)  74 - 99 mg/dL Final    Color, Urine 03/27/2024 Straw  Straw, Yellow Final    Appearance, Urine 03/27/2024 Clear  Clear Final    Specific Gravity, Urine 03/27/2024 1.008  1.005 - 1.035 Final    pH, Urine 03/27/2024 5.0  5.0, 5.5, 6.0, 6.5, 7.0, 7.5, 8.0 Final    Protein, Urine 03/27/2024 100 (2+) (N)  NEGATIVE mg/dL Final    Glucose, Urine 03/27/2024 50 (1+) (A)  NEGATIVE mg/dL Final    Blood, Urine 03/27/2024 SMALL (1+) (A)  NEGATIVE Final    Ketones, Urine 03/27/2024 NEGATIVE  NEGATIVE mg/dL Final    Bilirubin, Urine 03/27/2024 NEGATIVE  NEGATIVE Final    Urobilinogen, Urine 03/27/2024 <2.0  <2.0 mg/dL Final    Nitrite, Urine 03/27/2024 NEGATIVE  NEGATIVE Final    Leukocyte Esterase, Urine 03/27/2024 NEGATIVE  NEGATIVE Final    Ventricular Rate 03/27/2024 81  BPM Final    Atrial Rate 03/27/2024 96  BPM Final    MI Interval 03/27/2024 216  ms Final    QRS Duration 03/27/2024 82  ms Final    QT Interval 03/27/2024 652   ms Final    QTC Calculation(Bazett) 03/27/2024 757  ms Final    P Axis 03/27/2024 61  degrees Final    R Axis 03/27/2024 -65  degrees Final    T Axis 03/27/2024 68  degrees Final    QRS Count 03/27/2024 14  beats Final    Q Onset 03/27/2024 214  ms Final    P Onset 03/27/2024 106  ms Final    P Offset 03/27/2024 130  ms Final    T Offset 03/27/2024 540  ms Final    QTC Fredericia 03/27/2024 720  ms Final    AV pk jameel 03/27/2024 1.25  m/s Final    AV mn grad 03/27/2024 4.0  mmHg Final    LVOT diam 03/27/2024 1.80  cm Final    LV EF 03/27/2024 34  % Final    MV E/A ratio 03/27/2024 1.63   Final    LA vol index A/L 03/27/2024 34.2  ml/m2 Final    Tricuspid annular plane systolic e* 03/27/2024 1.0  cm Final    RV free wall pk S' 03/27/2024 9.25  cm/s Final    LVIDd 03/27/2024 5.08  cm Final    RVSP 03/27/2024 41.4  mmHg Final    Aortic Valve Area by Continuity of* 03/27/2024 2.11  cm2 Final    Aortic Valve Area by Continuity of* 03/27/2024 2.00  cm2 Final    AV pk grad 03/27/2024 6.3  mmHg Final    LV A4C EF 03/27/2024 36.0   Final    WBC, Urine 03/27/2024 NONE  1-5, NONE /HPF Final    RBC, Urine 03/27/2024 NONE  NONE, 1-2, 3-5 /HPF Final    Bacteria, Urine 03/27/2024 1+ (A)  NONE SEEN /HPF Final    Hyaline Casts, Urine 03/27/2024 2+ (A)  NONE /LPF Final    POCT Glucose 03/27/2024 184 (H)  74 - 99 mg/dL Final    WBC 03/28/2024 7.8  4.4 - 11.3 x10*3/uL Final    nRBC 03/28/2024 0.0  0.0 - 0.0 /100 WBCs Final    RBC 03/28/2024 3.79 (L)  4.50 - 5.90 x10*6/uL Final    Hemoglobin 03/28/2024 11.7 (L)  13.5 - 17.5 g/dL Final    Hematocrit 03/28/2024 35.8 (L)  41.0 - 52.0 % Final    MCV 03/28/2024 95  80 - 100 fL Final    MCH 03/28/2024 30.9  26.0 - 34.0 pg Final    MCHC 03/28/2024 32.7  32.0 - 36.0 g/dL Final    RDW 03/28/2024 16.6 (H)  11.5 - 14.5 % Final    Platelets 03/28/2024 204  150 - 450 x10*3/uL Final    Glucose 03/28/2024 150 (H)  74 - 99 mg/dL Final    Sodium 03/28/2024 138  136 - 145 mmol/L Final    Potassium  03/28/2024 4.4  3.5 - 5.3 mmol/L Final    Chloride 03/28/2024 105  98 - 107 mmol/L Final    Bicarbonate 03/28/2024 22  21 - 32 mmol/L Final    Anion Gap 03/28/2024 15  10 - 20 mmol/L Final    Urea Nitrogen 03/28/2024 41 (H)  6 - 23 mg/dL Final    Creatinine 03/28/2024 2.79 (H)  0.50 - 1.30 mg/dL Final    eGFR 03/28/2024 23 (L)  >60 mL/min/1.73m*2 Final    Calcium 03/28/2024 9.3  8.6 - 10.3 mg/dL Final    POCT Glucose 03/27/2024 173 (H)  74 - 99 mg/dL Final    Extra Tube 03/28/2024 Hold for add-ons.   Final    POCT Glucose 03/28/2024 158 (H)  74 - 99 mg/dL Final    POCT Glucose 03/28/2024 177 (H)  74 - 99 mg/dL Final    POCT Glucose 03/28/2024 124 (H)  74 - 99 mg/dL Final    Glucose 03/29/2024 105 (H)  74 - 99 mg/dL Final    Sodium 03/29/2024 139  136 - 145 mmol/L Final    Potassium 03/29/2024 4.0  3.5 - 5.3 mmol/L Final    Chloride 03/29/2024 105  98 - 107 mmol/L Final    Bicarbonate 03/29/2024 25  21 - 32 mmol/L Final    Anion Gap 03/29/2024 13  10 - 20 mmol/L Final    Urea Nitrogen 03/29/2024 44 (H)  6 - 23 mg/dL Final    Creatinine 03/29/2024 2.74 (H)  0.50 - 1.30 mg/dL Final    eGFR 03/29/2024 23 (L)  >60 mL/min/1.73m*2 Final    Calcium 03/29/2024 8.7  8.6 - 10.3 mg/dL Final    POCT Glucose 03/28/2024 156 (H)  74 - 99 mg/dL Final    POCT Glucose 03/29/2024 119 (H)  74 - 99 mg/dL Final    Extra Tube 03/29/2024 Hold for add-ons.   Final    POCT Glucose 03/29/2024 187 (H)  74 - 99 mg/dL Final    POCT Glucose 03/29/2024 155 (H)  74 - 99 mg/dL Final    Glucose 03/30/2024 137 (H)  74 - 99 mg/dL Final    Sodium 03/30/2024 137  136 - 145 mmol/L Final    Potassium 03/30/2024 4.1  3.5 - 5.3 mmol/L Final    Chloride 03/30/2024 102  98 - 107 mmol/L Final    Bicarbonate 03/30/2024 25  21 - 32 mmol/L Final    Anion Gap 03/30/2024 14  10 - 20 mmol/L Final    Urea Nitrogen 03/30/2024 46 (H)  6 - 23 mg/dL Final    Creatinine 03/30/2024 2.61 (H)  0.50 - 1.30 mg/dL Final    eGFR 03/30/2024 25 (L)  >60 mL/min/1.73m*2 Final     Calcium 03/30/2024 8.4 (L)  8.6 - 10.3 mg/dL Final    POCT Glucose 03/29/2024 169 (H)  74 - 99 mg/dL Final    Extra Tube 03/30/2024 Hold for add-ons.   Final    POCT Glucose 03/30/2024 131 (H)  74 - 99 mg/dL Final    POCT Glucose 03/30/2024 181 (H)  74 - 99 mg/dL Final   Office Visit on 03/20/2024   Component Date Value Ref Range Status    POC Triplex SARS-CoV-2 Ag 03/20/2024 Presumptive negative for Triplex SARS-CoV-2 (no antigen detected)  Presumptive negative for Triplex SARS-CoV-2 (no antigen detected) Final    POC Triplex Flu A-Ag 03/20/2024 Presumptive negative for Triplex FLU A (no antigen detected)  Presumptive negative for Triplex FLU A (no antigen detected) Final    POC Triplex Flu B-Ag 03/20/2024 Presumptive negative for Triplex FLU B (no antigen detected)  Presumptive negative for Triplex FLU B (no antigen detected) Final   Lab Requisition on 02/22/2024   Component Date Value Ref Range Status    Case Report 02/22/2024    Final                    Value:Medical Cytology Report                           Case: [id number]                                  Authorizing Provider:  Maxwell Cunningham, Collected:           02/22/2024 1200                                     MD                                                                           Ordering Location:     [facility]       Received:            02/25/2024 1852                                     Center                                                                       Specimen:    PLEURAL FLUID RIGHT SIDE                                                                   Gross Description 02/22/2024    Final                    Value:This result contains rich text formatting which cannot be displayed here.    Specimen Processing Detail 02/22/2024    Final                    Value:This result contains rich text formatting which cannot be displayed here.       Radiology: Reviewed imaging in powerchart.  No results found.    No family  history on file.  Social History     Socioeconomic History    Marital status:      Spouse name: None    Number of children: None    Years of education: None    Highest education level: None   Occupational History    None   Tobacco Use    Smoking status: Never    Smokeless tobacco: Never   Substance and Sexual Activity    Alcohol use: Not Currently    Drug use: None    Sexual activity: None   Other Topics Concern    None   Social History Narrative    None     Social Determinants of Health     Financial Resource Strain: Low Risk  (3/27/2024)    Overall Financial Resource Strain (CARDIA)     Difficulty of Paying Living Expenses: Not hard at all   Food Insecurity: Not on file   Transportation Needs: No Transportation Needs (3/27/2024)    PRAPARE - Transportation     Lack of Transportation (Medical): No     Lack of Transportation (Non-Medical): No   Physical Activity: Not on file   Stress: Not on file   Social Connections: Not on file   Intimate Partner Violence: Not on file   Housing Stability: Low Risk  (3/27/2024)    Housing Stability Vital Sign     Unable to Pay for Housing in the Last Year: No     Number of Places Lived in the Last Year: 1     Unstable Housing in the Last Year: No     Past Medical History:   Diagnosis Date    Acute cystitis with hematuria 12/31/2016    Acute cystitis with hematuria    Personal history of other diseases of the circulatory system     History of hypertension    Personal history of other diseases of the circulatory system     History of cardiac disorder    Personal history of other endocrine, nutritional and metabolic disease     History of diabetic neuropathy    Personal history of other endocrine, nutritional and metabolic disease     History of hyperlipidemia    Personal history of other endocrine, nutritional and metabolic disease     History of type 2 diabetes mellitus    Personal history of other specified conditions     History of insomnia     Past Surgical History:    Procedure Laterality Date    OTHER SURGICAL HISTORY  09/17/2019    Bypass    OTHER SURGICAL HISTORY  09/17/2019    Arterial stent placement       Charting was completed using voice recognition technology and may include unintended errors.

## 2024-04-05 ENCOUNTER — PATIENT OUTREACH (OUTPATIENT)
Dept: CARE COORDINATION | Facility: CLINIC | Age: 74
End: 2024-04-05
Payer: MEDICARE

## 2024-04-05 NOTE — PROGRESS NOTES
Call regarding appt. with PCP on 4/4/2024 after hospitalization.  At time of outreach call the patient feels as if their condition has improved since last visit.  Reviewed the PCP appointment with the pt and addressed any questions or concerns.  Fredrick states he is doing well, no questions.

## 2024-04-11 ENCOUNTER — OFFICE VISIT (OUTPATIENT)
Dept: CARDIOLOGY | Facility: CLINIC | Age: 74
End: 2024-04-11
Payer: MEDICARE

## 2024-04-11 VITALS
HEIGHT: 66 IN | DIASTOLIC BLOOD PRESSURE: 76 MMHG | WEIGHT: 151.8 LBS | HEART RATE: 97 BPM | OXYGEN SATURATION: 97 % | SYSTOLIC BLOOD PRESSURE: 157 MMHG | BODY MASS INDEX: 24.4 KG/M2

## 2024-04-11 DIAGNOSIS — N18.32 TYPE 2 DIABETES MELLITUS WITH STAGE 3B CHRONIC KIDNEY DISEASE, WITH LONG-TERM CURRENT USE OF INSULIN (MULTI): ICD-10-CM

## 2024-04-11 DIAGNOSIS — I25.810 ATHEROSCLEROSIS OF CORONARY ARTERY BYPASS GRAFT OF NATIVE HEART WITHOUT ANGINA PECTORIS: Primary | ICD-10-CM

## 2024-04-11 DIAGNOSIS — E11.22 TYPE 2 DIABETES MELLITUS WITH STAGE 3B CHRONIC KIDNEY DISEASE, WITH LONG-TERM CURRENT USE OF INSULIN (MULTI): ICD-10-CM

## 2024-04-11 DIAGNOSIS — F51.01 PRIMARY INSOMNIA: ICD-10-CM

## 2024-04-11 DIAGNOSIS — N18.30 STAGE 3 CHRONIC KIDNEY DISEASE, UNSPECIFIED WHETHER STAGE 3A OR 3B CKD (MULTI): ICD-10-CM

## 2024-04-11 DIAGNOSIS — Z79.4 TYPE 2 DIABETES MELLITUS WITH STAGE 3B CHRONIC KIDNEY DISEASE, WITH LONG-TERM CURRENT USE OF INSULIN (MULTI): ICD-10-CM

## 2024-04-11 DIAGNOSIS — I50.42 CHRONIC COMBINED SYSTOLIC AND DIASTOLIC CONGESTIVE HEART FAILURE (MULTI): ICD-10-CM

## 2024-04-11 PROCEDURE — 3078F DIAST BP <80 MM HG: CPT | Performed by: INTERNAL MEDICINE

## 2024-04-11 PROCEDURE — 99215 OFFICE O/P EST HI 40 MIN: CPT | Performed by: INTERNAL MEDICINE

## 2024-04-11 PROCEDURE — 1160F RVW MEDS BY RX/DR IN RCRD: CPT | Performed by: INTERNAL MEDICINE

## 2024-04-11 PROCEDURE — 1111F DSCHRG MED/CURRENT MED MERGE: CPT | Performed by: INTERNAL MEDICINE

## 2024-04-11 PROCEDURE — 1159F MED LIST DOCD IN RCRD: CPT | Performed by: INTERNAL MEDICINE

## 2024-04-11 PROCEDURE — 99205 OFFICE O/P NEW HI 60 MIN: CPT | Performed by: INTERNAL MEDICINE

## 2024-04-11 PROCEDURE — 4010F ACE/ARB THERAPY RXD/TAKEN: CPT | Performed by: INTERNAL MEDICINE

## 2024-04-11 PROCEDURE — 3077F SYST BP >= 140 MM HG: CPT | Performed by: INTERNAL MEDICINE

## 2024-04-11 RX ORDER — SACUBITRIL AND VALSARTAN 24; 26 MG/1; MG/1
1 TABLET, FILM COATED ORAL 2 TIMES DAILY
Qty: 60 TABLET | Refills: 5 | Status: SHIPPED | OUTPATIENT
Start: 2024-04-11 | End: 2024-10-08

## 2024-04-11 RX ORDER — LOSARTAN POTASSIUM 25 MG/1
25 TABLET ORAL DAILY
COMMUNITY

## 2024-04-11 RX ORDER — TORSEMIDE 20 MG/1
20 TABLET ORAL DAILY
Start: 2024-04-11

## 2024-04-11 RX ORDER — ATORVASTATIN CALCIUM 40 MG/1
40 TABLET, FILM COATED ORAL DAILY
Qty: 90 TABLET | Refills: 3 | Status: SHIPPED | OUTPATIENT
Start: 2024-04-11

## 2024-04-11 NOTE — PATIENT INSTRUCTIONS
To reach Dr. Tam's office please call Gus: [phone]. Fax [phone]. Call [phone] to schedule an appointment. You may also contact the HF RNs at [email or web address]    Stay off Farxiga for now  Find out copay for Entresto and let us know-   Get bloodwork next week   Return in 6 weeks  Increase atorvastatin to 40mg.

## 2024-04-11 NOTE — PROGRESS NOTES
Advanced Heart Failure Clinic Note    CHIEF COMPLAINT:  No chief complaint on file.       Primary Care Physician: Nydia De La Paz MD  Cardiologist Corin/Filemon      HISTORY OF PRESENT ILLNESS:   Fredrick De La Paz is a 73 y.o. male with CAD/systolic heart failure who presents as a new referral     Briefly he had RCA STEMI when he was 28 years old with no revascularization at that time, then had CABG at [facility] in 1997 with subsequent 2004 PCI to SVG-Lcx with another PCI for restenosis in 2007, another PCI for Lcx in 2008,     He was admitted with heart failure in 12/2023 requiring Bipap there was a discussed about coronary angiogram but patient declined at that time however he later followed up at  and he had a staged PCI LMCA to Lcx 2/5 the VERONIQUE into the LAD 3/13/2024.     From a heart failure standpoing he continues to have dyspnea on exertion not sure if he is improved after PCI. He was readmitted to [facility] in 3/2024 with heart failure, had bilateral thoracentesis, his stay was complicated by acute on chronic renal failure.     He was eventually discharged on BB/ARB, the plan had been to start him on ARNI and SGLT2i but these were quite expensive so while he has samples, he did not start taking     Other medical issues include VALERIE with bilateral renal stents     He was never smoker, no ETOH. He is a retired , he comes with his ex-wife who does not join him in the room. Strong family history of CAD    Monitors/restricts salt/fluid : yes  Has scale and weighs self daily: up 3 pounds  Has BP cuff and BPs at home:  not really   Checks BS at home:  140-200      No Known Allergies    CURRENT MEDICATIONS:    Current Outpatient Medications   Medication Sig Dispense Refill    allopurinol (Zyloprim) 100 mg tablet Take 1 tablet (100 mg) by mouth once daily.      amLODIPine (Norvasc) 10 mg tablet Take 1 tablet (10 mg) by mouth once daily.      aspirin 81 mg EC tablet Take 1 tablet (81 mg) by mouth once  Pt cut the top of his left index finger closing it into a door jam.  Pt states unknown last tDAP, bleeding is controlled.   daily.      atorvastatin (Lipitor) 20 mg tablet Take 1 tablet (20 mg) by mouth once daily.      Basaglar KwikPen U-100 Insulin 100 unit/mL (3 mL) pen Inject 12 Units under the skin once daily in the morning.      clopidogrel (Plavix) 75 mg tablet Take 1 tablet (75 mg) by mouth once daily.      isosorbide mononitrate ER (Imdur) 60 mg 24 hr tablet Take 1 tablet (60 mg) by mouth once daily.      losartan (Cozaar) 25 mg tablet Take 1 tablet (25 mg) by mouth once daily.      metoprolol succinate XL (Toprol-XL) 100 mg 24 hr tablet Take 1 tablet (100 mg) by mouth once daily.      pantoprazole (ProtoNix) 40 mg EC tablet Take 1 tablet (40 mg) by mouth once daily in the morning. Take before meals.      sacubitriL-valsartan (Entresto) 24-26 mg tablet Take 1 tablet by mouth 2 times a day. 60 tablet 0    spironolactone (Aldactone) 25 mg tablet Take 1 tablet (25 mg) by mouth once daily.      torsemide (Demadex) 20 mg tablet Take 2 tablets (40 mg) by mouth once daily.      traZODone (Desyrel) 50 mg tablet Take 1 tablet (50 mg) by mouth as needed at bedtime for sleep for up to 7 days. 7 tablet 0    empagliflozin (Jardiance) 10 mg Take 1 tablet (10 mg) by mouth once daily. (Patient not taking: Reported on 4/11/2024) 30 tablet 2    insulin lispro (HumaLOG U-100 Insulin) 100 unit/mL injection Inject 0.17 mL (17 Units) under the skin 2 times a day with meals. Take as directed per insulin instructions. (Patient not taking: Reported on 4/11/2024) 10.2 mL 11    multivitamin tablet Take 1 tablet by mouth once daily.      zolpidem (Ambien) 10 mg tablet Take 1 tablet (10 mg) by mouth once daily at bedtime.       No current facility-administered medications for this visit.         Objective    Problems:   Patient Active Problem List   Diagnosis    Atherosclerosis of coronary artery bypass graft    Stage 3 chronic kidney disease (CMS/HCC)    Type 2 diabetes mellitus with diabetic chronic kidney disease (CMS/HCC)    Insomnia    Chronic combined  systolic and diastolic congestive heart failure (CMS/[facility])    Acute congestive heart failure, unspecified heart failure type (CMS/[facility])    Elevated troponin    NSTEMI (non-ST elevated myocardial infarction) (CMS/[facility])     Medical History:   Past Medical History:   Diagnosis Date    Acute cystitis with hematuria 12/31/2016    Acute cystitis with hematuria    Personal history of other diseases of the circulatory system     History of hypertension    Personal history of other diseases of the circulatory system     History of cardiac disorder    Personal history of other endocrine, nutritional and metabolic disease     History of diabetic neuropathy    Personal history of other endocrine, nutritional and metabolic disease     History of hyperlipidemia    Personal history of other endocrine, nutritional and metabolic disease     History of type 2 diabetes mellitus    Personal history of other specified conditions     History of insomnia      has a past medical history of Acute cystitis with hematuria (12/31/2016), Personal history of other diseases of the circulatory system, Personal history of other diseases of the circulatory system, Personal history of other endocrine, nutritional and metabolic disease, Personal history of other endocrine, nutritional and metabolic disease, Personal history of other endocrine, nutritional and metabolic disease, and Personal history of other specified conditions.  Surgical Hx:   Past Surgical History:   Procedure Laterality Date    OTHER SURGICAL HISTORY  09/17/2019    Bypass    OTHER SURGICAL HISTORY  09/17/2019    Arterial stent placement       has a past surgical history that includes Other surgical history (09/17/2019) and Other surgical history (09/17/2019).  Social Hx:   Tobacco Use: Low Risk  (4/11/2024)    Patient History     Smoking Tobacco Use: Never     Smokeless Tobacco Use: Never     Passive Exposure: Not on file     Alcohol Use: Not At Risk (3/27/2024)    AUDIT-C      "Frequency of Alcohol Consumption: Never     Average Number of Drinks: Patient does not drink     Frequency of Binge Drinking: Never      reports that he has never smoked. He has never used smokeless tobacco. He reports that he does not currently use alcohol. No history on file for drug use.   Family Hx:   No family history on file.  family history is not on file.  Allergies:   No Known Allergies       PHYSICAL EXAM:   Body mass index is 24.5 kg/m².    TRENDS:   Vitals:       3/29/2024    11:15 PM 3/30/2024     1:00 AM 3/30/2024     3:19 AM 3/30/2024     8:16 AM 3/30/2024    12:26 PM 2024     1:17 PM 2024     3:33 PM   Vitals   Systolic 141  144 150 136 116 157   Diastolic 69  67 70 69 78 76   Heart Rate 75  74 83 74 76 97   Temp 36 °C (96.8 °F)  36.2 °C (97.2 °F) 36.2 °C (97.2 °F) 36 °C (96.8 °F)     Resp 22 21 20 20      Height (in)       1.676 m (5' 6\")   Weight (lb)      152 151.8   BMI      24.55 kg/m2 24.5 kg/m2   BSA (m2)      1.79 m2 1.79 m2   Visit Report      Report Report     WEIGHT TREND:   Wt Readings from Last 5 Encounters:   24 68.9 kg (151 lb 12.8 oz)   24 68.9 kg (152 lb)   24 68.5 kg (151 lb 0.2 oz)   24 68 kg (150 lb)   24 68 kg (150 lb)       Objective      73 y.o.year old male, awake alert orient X 3 in NAD, cvp 7cm PMI is slightly displaced with an apical murmur but lungs are clear and trace to 1+ edema    LABS:    RESUFAST(CHOL:1,HDL:1,LDLF:1,TRI):   Lab Results   Component Value Date    CHOL 184 2019    HDL 47.1 2019    LDLF 102 (H) 2019    TRIG 176 (H) 2019     GETLABS(6M,2):   Admission on 2024, Discharged on 2024   Component Date Value    Ventricular Rate 2024 98     Atrial Rate 2024 98     DC Interval 2024 219     QRS Duration 2024 108     QT Interval 2024 372     QTC Calculation(Bazett) 2024 476     P Axis 2024 60     R Axis 2024 30     T Concord 2024 134     " QRS Count 03/27/2024 15     Q Onset 03/27/2024 253     T Offset 03/27/2024 439     QTC Fredericia 03/27/2024 438     WBC 03/27/2024 9.9     nRBC 03/27/2024 0.0     RBC 03/27/2024 4.23 (L)     Hemoglobin 03/27/2024 12.7 (L)     Hematocrit 03/27/2024 40.7 (L)     MCV 03/27/2024 96     MCH 03/27/2024 30.0     MCHC 03/27/2024 31.2 (L)     RDW 03/27/2024 16.6 (H)     Platelets 03/27/2024 246     Neutrophils % 03/27/2024 60.7     Immature Granulocytes %,* 03/27/2024 0.3     Lymphocytes % 03/27/2024 31.2     Monocytes % 03/27/2024 5.1     Eosinophils % 03/27/2024 2.0     Basophils % 03/27/2024 0.7     Neutrophils Absolute 03/27/2024 6.03 (H)     Immature Granulocytes Ab* 03/27/2024 0.03     Lymphocytes Absolute 03/27/2024 3.10 (H)     Monocytes Absolute 03/27/2024 0.51     Eosinophils Absolute 03/27/2024 0.20     Basophils Absolute 03/27/2024 0.07     Magnesium 03/27/2024 1.81     Glucose 03/27/2024 262 (H)     Sodium 03/27/2024 137     Potassium 03/27/2024 4.8     Chloride 03/27/2024 103     Bicarbonate 03/27/2024 21     Anion Gap 03/27/2024 18     Urea Nitrogen 03/27/2024 36 (H)     Creatinine 03/27/2024 2.61 (H)     eGFR 03/27/2024 25 (L)     Calcium 03/27/2024 9.7     Albumin 03/27/2024 4.2     Alkaline Phosphatase 03/27/2024 58     Total Protein 03/27/2024 7.8     AST 03/27/2024 19     Bilirubin, Total 03/27/2024 0.7     ALT 03/27/2024 14     Phosphorus 03/27/2024 4.3     Protime 03/27/2024 12.1     INR 03/27/2024 1.1     aPTT 03/27/2024 28     BNP 03/27/2024 2,456 (H)     ABO TYPE 03/27/2024 B     Rh TYPE 03/27/2024 POS     ANTIBODY SCREEN 03/27/2024 NEG     Troponin I, High Sensiti* 03/27/2024 44 (H)     Troponin I, High Sensiti* 03/27/2024 134 (HH)     Troponin I, High Sensiti* 03/27/2024 1,075 (HH)     POCT Glucose 03/27/2024 169 (H)     Color, Urine 03/27/2024 Straw     Appearance, Urine 03/27/2024 Clear     Specific Gravity, Urine 03/27/2024 1.008     pH, Urine 03/27/2024 5.0     Protein, Urine 03/27/2024 100  (2+) (N)     Glucose, Urine 03/27/2024 50 (1+) (A)     Blood, Urine 03/27/2024 SMALL (1+) (A)     Ketones, Urine 03/27/2024 NEGATIVE     Bilirubin, Urine 03/27/2024 NEGATIVE     Urobilinogen, Urine 03/27/2024 <2.0     Nitrite, Urine 03/27/2024 NEGATIVE     Leukocyte Esterase, Urine 03/27/2024 NEGATIVE     Ventricular Rate 03/27/2024 81     Atrial Rate 03/27/2024 96     VT Interval 03/27/2024 216     QRS Duration 03/27/2024 82     QT Interval 03/27/2024 652     QTC Calculation(Bazett) 03/27/2024 757     P Axis 03/27/2024 61     R Axis 03/27/2024 -65     T Axis 03/27/2024 68     QRS Count 03/27/2024 14     Q Onset 03/27/2024 214     P Onset 03/27/2024 106     P Offset 03/27/2024 130     T Offset 03/27/2024 540     QTC Fredericia 03/27/2024 720     AV pk jameel 03/27/2024 1.25     AV mn grad 03/27/2024 4.0     LVOT diam 03/27/2024 1.80     LV EF 03/27/2024 34     MV E/A ratio 03/27/2024 1.63     LA vol index A/L 03/27/2024 34.2     Tricuspid annular plane * 03/27/2024 1.0     RV free wall pk S' 03/27/2024 9.25     LVIDd 03/27/2024 5.08     RVSP 03/27/2024 41.4     Aortic Valve Area by Con* 03/27/2024 2.11     Aortic Valve Area by Con* 03/27/2024 2.00     AV pk grad 03/27/2024 6.3     LV A4C EF 03/27/2024 36.0     WBC, Urine 03/27/2024 NONE     RBC, Urine 03/27/2024 NONE     Bacteria, Urine 03/27/2024 1+ (A)     Hyaline Casts, Urine 03/27/2024 2+ (A)     POCT Glucose 03/27/2024 184 (H)     WBC 03/28/2024 7.8     nRBC 03/28/2024 0.0     RBC 03/28/2024 3.79 (L)     Hemoglobin 03/28/2024 11.7 (L)     Hematocrit 03/28/2024 35.8 (L)     MCV 03/28/2024 95     MCH 03/28/2024 30.9     MCHC 03/28/2024 32.7     RDW 03/28/2024 16.6 (H)     Platelets 03/28/2024 204     Glucose 03/28/2024 150 (H)     Sodium 03/28/2024 138     Potassium 03/28/2024 4.4     Chloride 03/28/2024 105     Bicarbonate 03/28/2024 22     Anion Gap 03/28/2024 15     Urea Nitrogen 03/28/2024 41 (H)     Creatinine 03/28/2024 2.79 (H)     eGFR 03/28/2024 23 (L)      Calcium 03/28/2024 9.3     POCT Glucose 03/27/2024 173 (H)     Extra Tube 03/28/2024 Hold for add-ons.     POCT Glucose 03/28/2024 158 (H)     POCT Glucose 03/28/2024 177 (H)     POCT Glucose 03/28/2024 124 (H)     Glucose 03/29/2024 105 (H)     Sodium 03/29/2024 139     Potassium 03/29/2024 4.0     Chloride 03/29/2024 105     Bicarbonate 03/29/2024 25     Anion Gap 03/29/2024 13     Urea Nitrogen 03/29/2024 44 (H)     Creatinine 03/29/2024 2.74 (H)     eGFR 03/29/2024 23 (L)     Calcium 03/29/2024 8.7     POCT Glucose 03/28/2024 156 (H)     POCT Glucose 03/29/2024 119 (H)     Extra Tube 03/29/2024 Hold for add-ons.     POCT Glucose 03/29/2024 187 (H)     POCT Glucose 03/29/2024 155 (H)     Glucose 03/30/2024 137 (H)     Sodium 03/30/2024 137     Potassium 03/30/2024 4.1     Chloride 03/30/2024 102     Bicarbonate 03/30/2024 25     Anion Gap 03/30/2024 14     Urea Nitrogen 03/30/2024 46 (H)     Creatinine 03/30/2024 2.61 (H)     eGFR 03/30/2024 25 (L)     Calcium 03/30/2024 8.4 (L)     POCT Glucose 03/29/2024 169 (H)     Extra Tube 03/30/2024 Hold for add-ons.     POCT Glucose 03/30/2024 131 (H)     POCT Glucose 03/30/2024 181 (H)    Office Visit on 03/20/2024   Component Date Value    POC Triplex SARS-CoV-2 Ag 03/20/2024 Presumptive negative for Triplex SARS-CoV-2 (no antigen detected)     POC Triplex Flu A-Ag 03/20/2024 Presumptive negative for Triplex FLU A (no antigen detected)     POC Triplex Flu B-Ag 03/20/2024 Presumptive negative for Triplex FLU B (no antigen detected)    Lab Requisition on 02/22/2024   Component Date Value    Case Report 02/22/2024                      Value:Medical Cytology Report                           Case: [id number]                                  Authorizing Provider:  Maxwell Cunningham, Collected:           02/22/2024 1200                                     MD                                                                           Ordering Location:     [facility]  Medical       Received:            02/25/2024 1852                                     [place]                                                                       Specimen:    PLEURAL FLUID RIGHT SIDE                                                                   Gross Description 02/22/2024                      Value:This result contains rich text formatting which cannot be displayed here.    Specimen Processing Deta* 02/22/2024                      Value:This result contains rich text formatting which cannot be displayed here.     LABBRIEF(HGB:3)   Lab Results   Component Value Date    HGB 11.7 (L) 03/28/2024    HGB 12.7 (L) 03/27/2024    HGB 14.7 09/18/2019       CMP:  Recent Labs     03/30/24  0527 03/29/24  0549 03/28/24  0520 03/27/24  0821 04/25/22  1634    139 138 137 136   K 4.1 4.0 4.4 4.8 5.2    105 105 103 102   CO2 25 25 22 21 23   ANIONGAP 14 13 15 18 16   BUN 46* 44* 41* 36* 39*   CREATININE 2.61* 2.74* 2.79* 2.61* 1.99*   EGFR 25* 23* 23* 25*  --    MG  --   --   --  1.81  --      Recent Labs     03/27/24  0821 04/25/22  1634 09/18/19  1317   ALBUMIN 4.2 4.5 4.7   ALKPHOS 58 57 57   ALT 14 19 17   AST 19 17 18   BILITOT 0.7 0.4 0.5     CBC:  Recent Labs     03/28/24  0520 03/27/24  0821 09/18/19  1317   WBC 7.8 9.9 5.5   HGB 11.7* 12.7* 14.7   HCT 35.8* 40.7* 47.2    246 182   MCV 95 96 96     HEME/ENDO:  Recent Labs     12/03/23  1247 09/18/19  1317   TSH  --  1.90   HGBA1C 8.0* 6.9      CARDIAC:   Recent Labs     03/27/24  1214 03/27/24  0931 03/27/24  0821   TROPHS 1,075* 134* 44*   BNP  --   --  2,456*     Recent Labs     09/18/19  1317   CHOL 184   LDLF 102*   HDL 47.1   TRIG 176*       BNP   Date Value Ref Range Status   03/27/2024 2,456 (H) 0 - 99 pg/mL Final         DIAGNOSTIC STUDIES REVIEWED:        EKG:    Encounter Date: 03/27/24   ECG 12 lead   Result Value    Ventricular Rate 81    Atrial Rate 96    SD Interval 216    QRS Duration 82    QT Interval 652    QTC  Calculation(Bazett) 757    P Axis 61    R Axis -65    T Axis 68    QRS Count 14    Q Onset 214    P Onset 106    P Offset 130    T Offset 540    QTC Fredericia 720    Narrative    Sinus rhythm with 1st degree AV block  Left axis deviation  Anteroseptal infarct (cited on or before 27-MAR-2024)  BASELINE ARTIFACT  Abnormal ECG  Confirmed by Humza Mar ([id number]) on 4/1/2024 11:42:31 AM       ECHO 3/2024   1. Left ventricular systolic function is severely decreased with a 25% estimated ejection fraction.   2. The left atrium is moderately dilated.   3. Mild to moderate mitral valve regurgitation.   4. Mild aortic valve regurgitation.   5. Mildly elevated RVSP.    CORONARY ANGIOGRAM  PCI left main to LCx 2/5/2024. PCI VERONIQUE to LAD 3/13/2024 (unable to view angiogram)      ASSESSMENT AND PLAN:   Patient Active Problem List   Diagnosis    Atherosclerosis of coronary artery bypass graft    Stage 3 chronic kidney disease (CMS/[facility])    Type 2 diabetes mellitus with diabetic chronic kidney disease (CMS/[facility])    Insomnia    Chronic combined systolic and diastolic congestive heart failure (CMS/[facility])    Acute congestive heart failure, unspecified heart failure type (CMS/[facility])    Elevated troponin    NSTEMI (non-ST elevated myocardial infarction) (CMS/[facility])       Chronic systolic heart failure despite taking lower than the prescribed dose of torsemide he appears euvolemic and well-perfused based issues with managing his blood pressure in the setting of significant CKD, both SGLT2's and ARNI will be challenging due to cost would prefer Entresto ask him to determine what his monthly co-pay would be and then we can potentially work on patient assistance.     Otherwise he continues on a fairly good dose of metoprolol and MRA, we will repeat blood work next week to check his stability of renal function. Pleural effusion seem to have resolved which is encouraging    Despite very longstanding CAD his ejection fraction was apparently normal  until just recently so I am optimistic we can achieve at least some degree of cardiac recovery with a combination of revascularization and medication optimization.  Renal function would be the primary barrier to LVAD at this point does not appear to be a viable candidate    We discussed he is welcome to follow-up either with our clinic or with his primary cardiologist or combination of both depending on his preference    Ischemic cardiomyopathy no recent angina he is not sure he feels better after the recent revascularization but may also be beneficial for stabilizing his heart failure.  He remains on aspirin/clopidogrel with statin, we recommended increasing atorvastatin to 40 mg.    He is also on isosorbide primarily for blood pressure, as with amlodipine    CKD III/IV baseline creatinine is somewhat unclear renal function seem to be improving at his last blood work will still be challenging in the setting of significant heart failure    Diabetes he remains on insulin, ideally would add SGLT2 primarily for renal benefit but we will focus on obtaining Entresto first    Anemia monitor given ongoing dapt     Gout continue allopurinol  no recent episodes    I discussed heart failure therapeutic options, including: medical therapy, salt and fluid restriction, the need to monitor BP and weight, exercise and weight loss, need to control hypertension, need to control diabetes, follow up and medication changes.      I spent  68 minutes coordinating care, reviewing echo/labs/notes and discussing options and adjusting medications.     I spent >50 percent of the time counseling the patient and discussing the diagnosis, general prognosis and plan of care with the patient       Orders placed during the encounter: No orders of the defined types were placed in this encounter.     Followup Appts:  Future Appointments   Date Time Provider Department [place]   6/13/2024 10:00 AM Nydia De La Paz MD [id number] [place]   7/9/2024 10:30 AM  Nyida De La Paz MD [id number] [place] Up IV, MD  Heart Failure, Heart Transplant and  Mechanical Circulatory Support

## 2024-04-11 NOTE — PROGRESS NOTES
Referred by: unknown  Accompanied by: ex-wife (in lobby)    Denies fatigue, chest pain, chest pressure, palpitations, shortness of breath, dyspnea on exertion, orthopnea, PND. No edema noted in BLE.   Denies headaches, dizziness, lightheadedness, and falls.

## 2024-04-12 ENCOUNTER — APPOINTMENT (OUTPATIENT)
Dept: CARDIOLOGY | Facility: CLINIC | Age: 74
End: 2024-04-12
Payer: MEDICARE

## 2024-04-14 PROBLEM — I50.9 ACUTE CONGESTIVE HEART FAILURE, UNSPECIFIED HEART FAILURE TYPE (MULTI): Status: RESOLVED | Noted: 2024-03-27 | Resolved: 2024-04-14

## 2024-05-01 NOTE — DISCHARGE SUMMARY
Discharge Diagnosis  Acute congestive heart failure, unspecified heart failure type ([facility])    Issues Requiring Follow-Up      Discharge Meds     Your medication list        CONTINUE taking these medications        Instructions Last Dose Given Next Dose Due   allopurinol 100 mg tablet  Commonly known as: Zyloprim           aspirin 81 mg EC tablet           atorvastatin 20 mg tablet  Commonly known as: Lipitor           Basaglar KwikPen U-100 Insulin 100 unit/mL (3 mL) pen  Generic drug: insulin glargine           clopidogrel 75 mg tablet  Commonly known as: Plavix           empagliflozin 10 mg  Commonly known as: Jardiance      Take 1 tablet (10 mg) by mouth once daily.       insulin lispro 100 unit/mL injection  Commonly known as: HumaLOG U-100 Insulin      Inject 0.15 mL (15 Units) under the skin 2 times a day with meals. Take as directed per insulin instructions.       isosorbide mononitrate ER 60 mg 24 hr tablet  Commonly known as: Imdur           losartan 25 mg tablet  Commonly known as: Cozaar           metoprolol succinate  mg 24 hr tablet  Commonly known as: Toprol-XL           multivitamin tablet           pantoprazole 40 mg EC tablet  Commonly known as: ProtoNix           spironolactone 25 mg tablet  Commonly known as: Aldactone           torsemide 20 mg tablet  Commonly known as: Demadex           traZODone 50 mg tablet  Commonly known as: Desyrel      Take 1 tablet (50 mg) by mouth as needed at bedtime for sleep for up to 7 days.       zolpidem 10 mg tablet  Commonly known as: Ambien                    Test Results Pending At Discharge  Pending Labs       No current pending labs.            Hospital Course   73 y.o. male with past medical history significant for insomnia, HTN, HLD, CKD, type 2 diabetes mellitus, and diabetic neuropathy who presents to the ED today with complaints of shortness of breath.  States he started feeling worsening shortness of breath yesterday compared to his baseline.   States he had to sleep sitting up last night due to difficulty breathing.  Says the shortness of breath has been worse when he is laying down and also when he is exerting himself.  States the shortness of breath became progressively worse this morning which is why he went to come to the ED for evaluation.  Also reports mild chest pain and nausea, denies any vomiting.  Patient states he was recently at [facility] with the same issues and required a cardiac catheterization, stenting, and bilateral pleurocentesis.  Patient was placed on BiPAP in the ED, states his breathing feels much better now and his chest pain has almost resolved.  Admits to occasional dizziness when he becomes very short of breath.  Denies headaches, abdominal pain, vomiting, urinary changes, fevers or chills, or new weakness.  Does not wear oxygen at home.  Uses a cane to help with ambulation.  He was treated for his decompensated heart failure and PCI to left main he had a PCI to the room of these were done in February and then he had a PCI to the LAD in March so far he was stabilized on the day of his discharge she had no problems or issues went ahead and discharged him discharge day management greater than 30 minutes thank you    Pertinent Physical Exam At Time of Discharge  Physical Exam    Outpatient Follow-Up  Future Appointments   Date Time Provider Department [place]   5/16/2024 10:30 AM Ken Tam MD [id number] [place]   6/13/2024 10:00 AM Nydia De La Paz MD [id number] [place]   7/9/2024 10:30 AM Nydia De La Paz MD [id number] [place]         Jason Kuo DO

## 2024-05-16 ENCOUNTER — APPOINTMENT (OUTPATIENT)
Dept: CARDIOLOGY | Facility: CLINIC | Age: 74
End: 2024-05-16
Payer: MEDICARE

## 2024-06-13 ENCOUNTER — APPOINTMENT (OUTPATIENT)
Dept: PRIMARY CARE | Facility: CLINIC | Age: 74
End: 2024-06-13
Payer: MEDICARE

## 2024-06-13 ENCOUNTER — PATIENT OUTREACH (OUTPATIENT)
Dept: CARE COORDINATION | Facility: CLINIC | Age: 74
End: 2024-06-13

## 2024-06-13 VITALS
HEIGHT: 66 IN | OXYGEN SATURATION: 98 % | HEART RATE: 71 BPM | SYSTOLIC BLOOD PRESSURE: 148 MMHG | WEIGHT: 156 LBS | DIASTOLIC BLOOD PRESSURE: 82 MMHG | BODY MASS INDEX: 25.07 KG/M2

## 2024-06-13 DIAGNOSIS — I10 HYPERTENSION, UNSPECIFIED TYPE: Primary | ICD-10-CM

## 2024-06-13 DIAGNOSIS — I50.42 CHRONIC COMBINED SYSTOLIC AND DIASTOLIC CONGESTIVE HEART FAILURE (MULTI): ICD-10-CM

## 2024-06-13 DIAGNOSIS — N18.30 STAGE 3 CHRONIC KIDNEY DISEASE, UNSPECIFIED WHETHER STAGE 3A OR 3B CKD (MULTI): ICD-10-CM

## 2024-06-13 DIAGNOSIS — Z79.4 TYPE 2 DIABETES MELLITUS WITH CHRONIC KIDNEY DISEASE, WITH LONG-TERM CURRENT USE OF INSULIN, UNSPECIFIED CKD STAGE (MULTI): ICD-10-CM

## 2024-06-13 DIAGNOSIS — M1A.9XX0 CHRONIC GOUT WITHOUT TOPHUS, UNSPECIFIED CAUSE, UNSPECIFIED SITE: ICD-10-CM

## 2024-06-13 DIAGNOSIS — I70.1 RENAL ARTERY STENOSIS (CMS-HCC): ICD-10-CM

## 2024-06-13 DIAGNOSIS — E11.22 TYPE 2 DIABETES MELLITUS WITH CHRONIC KIDNEY DISEASE, WITH LONG-TERM CURRENT USE OF INSULIN, UNSPECIFIED CKD STAGE (MULTI): ICD-10-CM

## 2024-06-13 PROCEDURE — 1159F MED LIST DOCD IN RCRD: CPT | Performed by: STUDENT IN AN ORGANIZED HEALTH CARE EDUCATION/TRAINING PROGRAM

## 2024-06-13 PROCEDURE — 99214 OFFICE O/P EST MOD 30 MIN: CPT | Performed by: STUDENT IN AN ORGANIZED HEALTH CARE EDUCATION/TRAINING PROGRAM

## 2024-06-13 PROCEDURE — 4010F ACE/ARB THERAPY RXD/TAKEN: CPT | Performed by: STUDENT IN AN ORGANIZED HEALTH CARE EDUCATION/TRAINING PROGRAM

## 2024-06-13 PROCEDURE — 1160F RVW MEDS BY RX/DR IN RCRD: CPT | Performed by: STUDENT IN AN ORGANIZED HEALTH CARE EDUCATION/TRAINING PROGRAM

## 2024-06-13 PROCEDURE — 3077F SYST BP >= 140 MM HG: CPT | Performed by: STUDENT IN AN ORGANIZED HEALTH CARE EDUCATION/TRAINING PROGRAM

## 2024-06-13 PROCEDURE — 3079F DIAST BP 80-89 MM HG: CPT | Performed by: STUDENT IN AN ORGANIZED HEALTH CARE EDUCATION/TRAINING PROGRAM

## 2024-06-13 PROCEDURE — 1036F TOBACCO NON-USER: CPT | Performed by: STUDENT IN AN ORGANIZED HEALTH CARE EDUCATION/TRAINING PROGRAM

## 2024-06-13 RX ORDER — ALLOPURINOL 100 MG/1
100 TABLET ORAL DAILY
Qty: 90 TABLET | Refills: 3 | Status: SHIPPED | OUTPATIENT
Start: 2024-06-13

## 2024-06-13 RX ORDER — EMPAGLIFLOZIN 10 MG/1
10 TABLET, FILM COATED ORAL DAILY
COMMUNITY
Start: 2024-06-10

## 2024-06-13 RX ORDER — ZOLPIDEM TARTRATE 10 MG/1
10 TABLET ORAL NIGHTLY
Qty: 30 TABLET | Refills: 1 | Status: CANCELLED | OUTPATIENT
Start: 2024-06-13 | End: 2024-07-15

## 2024-06-13 RX ORDER — AMLODIPINE BESYLATE 10 MG/1
10 TABLET ORAL DAILY
Qty: 30 TABLET | Refills: 3 | Status: SHIPPED | OUTPATIENT
Start: 2024-06-13

## 2024-06-13 RX ORDER — FUROSEMIDE 20 MG/1
20 TABLET ORAL DAILY
COMMUNITY
Start: 2024-06-07

## 2024-06-13 RX ORDER — INSULIN LISPRO 100 [IU]/ML
17 INJECTION, SOLUTION INTRAVENOUS; SUBCUTANEOUS
Qty: 10.2 ML | Refills: 11 | Status: SHIPPED | OUTPATIENT
Start: 2024-06-13 | End: 2025-06-13

## 2024-06-13 NOTE — PROGRESS NOTES
Call regarding appt. with PCP on 6/13/2024 after hospitalization.  At time of outreach call the patient feels as if their condition has improved since last visit.  Reviewed the PCP appointment with the pt and addressed any questions or concerns.  Fredrick is doing well, he is trying to stay as active as he can, but states not the same as before.

## 2024-06-13 NOTE — PROGRESS NOTES
Subjective   Patient ID: Fredrick De La Paz is a 73 y.o. male who presents for follow up after his recent hospital admission with CHF exacerbation    AMW: 3/12/24; see chart     Assessment/Plan ==  Preventative Medicine  -UTD on vaccines   -Had colonoscopy done recently per patient. Results to be brought  -PSA checked by previous PCP. States that it was normal but has a hx of BPH.   -PHQ2: 0  -Alcohol screening done    CHF/HFrEF (EF 25%)  CAD  -Echo on 3/27/24 shows EF 25%  -Was prescribed GDMT but was unable to afford Entresto.  Continues to take Jardiance 10 mg p.o. dailyaf  -Has frequent exacerbations  -Had cardiac cath  in March 2024.  No intervention done at that    PLAN  -Currently on losartan 25 mg daily as he cannot afford Entresto.  -Continue torsemide, jardiance, metoprolol, spironolactone   -Refer to EP for AICD. Given number to EP at [facility] cardiology  during last appointment,Still has not made an appointment.  -Referral given to Dr. Cobb for general cardiology follow up. Was seen at  HF clinic recently but is declining to go back to [facility].   -Declining cardiac rehab     Pulmonary Nodule  -Seen in CT chest from March 2024  -Recommend 1 year repeat CT     VALERIE  -Hx of previous remote stenting and balloon angioplasty  -Continue to monitor renal function  -Follows with Dr Casillas (vascular surgery)     T2DM  -Regarding his DM, he is on Basalglar 10 units in the morning, Humalog 15 units with each meal (as his insurance covers humalog but not novolog). Last A1c was 8.0 while at [facility]. He is compliant with home medications at this time. States that he eats a high carb diet. Advised patient to decrease carb intake  -Will increase humalog to 17U BIDWM and basaglar to 12U qAM.     CKD-III  -Continue Arb   -Jardiance  -Avoid nephrotoxic medications   -CMP ordered     Insomnia   -Continue ambien  -Last filled on 6/6 by Dr JAMIL Whitmore    HTN  -Controlled  -Continue ARB, norvasc and metoprolol   -Low salt diet      OARRS Report Reviewed and appropriate.  UDS reviewed.  I have personally reviewed the OARRS report.  I have considered the risks of abuse, dependence, addiction and diversion. I believe that it is clinically appropriate for this patient to be prescribed this medication based on documented diagnosis.     Controlled Substance Agreement:   I have printed this form and reviewed each line item with the patient and the patient has verbalized understanding.   Date of the last Controlled Substance Agreement: 2/27/24  Date of UDS To be obtained    I discussed patient's OARRS report as well as the potential concern for overdose on prescribed opioid, sleep aid, gabapentin/Lyrica, and/or benzodiazepines. I explained that Overdose Risk Scores >460, require a Narcan prescription and places the patient at risk for potential death.      Patient understands that the risk of death can occur when using opioid, benzodiazepines, gabapentin, Lyrica, sleep aids, and illegal drugs. The risk of death especially increases when using the above medications and or illegal drugs in combination. I have reviewed with the patient and the patient is in agreement with the terms of the  Controlled Substance Agreement.      At this point in time, patient is in compliance with the above, and has no signs of dependence or addiction to the above prescribed medications.          HEIDI  73M presents for annual physical and follow up. He was admitted to [facility] from 3/27 to 3/30/2024.  Patient went to [place] for evaluation of shortness of breath.  Was found to be in acute CHF exacerbation and was treated with IV diuresis.  Stay was complicated by cardiorenal syndrome and nephrology was consulted.  Patient also underwent echocardiogram during that hospitalization and was found an EF of 25%.  He also underwent diagnostic cath but no intervention was done.  Once his shortness of breath improved after diuresis patient was discharged home.        Currently  "doing well. SOB is minimal. Has some fatigue, but states this is chronic. Denies any new PND, orthopnea, leg swelling, CP, fevers, chills.     Tolerating POC appropriately.     Stopped taking entresto due to expense. Currently only on Losartan 25mg PO daily.     Discussed glycemic control. States BG levels are typically in the mid 150s at any given time. Has appointment with Endocrine today.     Lastly we discussed optimization of patient's cardiology medications.  Continue ARB, MRA, beta blocker and SGLT2i. Patient will seek financial assistance programs for Entresto. Referrals given for EP and general cardiology.       All complaints and concerns addressed.    Denies fevers, chills, weight loss, lightheadedness, dizziness, vision changes, sore throat, runny nose, CP, SOB, cough, palpitations, n/v/d, abd pain, black/bloody stools, arthralgias, mood disturbance, or new numbness/weakness/tingling in arms/legs/face.        PMH: CKD, HFrEF, CAD, Insomnia, HTN, T2DM, VALERIE  Surgeries: PCI, renal artery stenting, PAD with stenting.     Social hx: Non smoker, denies Etoh drinking, no drugs, he med school lecturer, and is still working online part time      Denies fevers, chills, weight loss, lightheadedness, dizziness, vision changes, sore throat, runny nose, CP, cough, palpitations, n/v/d, abd pain, black/bloody stools, arthralgias, or new numbness/weakness/tingling in arms/legs/face.        Visit Vitals  /82   Pulse 71   Ht 1.676 m (5' 6\")   Wt 70.8 kg (156 lb)   SpO2 98%   BMI 25.18 kg/m²   Smoking Status Never   BSA 1.82 m²     PHYSICAL EXAM     Physical Exam     Visit Vitals  /82   Pulse 71   Ht 1.676 m (5' 6\")   Wt 70.8 kg (156 lb)   SpO2 98%   BMI 25.18 kg/m²   Smoking Status Never   BSA 1.82 m²        General: NAD. NCAT. Aox3   HEENT: PERRLA. EOMI. MMM. Nares patent bl.  Cardiovascular: RRR. S1/S2 wnl. No JVD.   Respiratory: CTABL. No acute respiratory distress. No crackles  GI: Soft, NT abdomen.   MSK: " ROM x 4. Weakness is improved.   Extremities: No BLLE edema. baseline.   Skin: No rashes or bruises.   Neuro: Aox3. Cranial Nerves grossly intact. Motor/sensory wnl.   Psych: Mood wnl.       REVIEW OF SYSTEMS     ROS in HPI   No Known Allergies    Current Outpatient Medications   Medication Sig Dispense Refill    allopurinol (Zyloprim) 100 mg tablet Take 1 tablet (100 mg) by mouth once daily.      amLODIPine (Norvasc) 10 mg tablet Take 1 tablet (10 mg) by mouth once daily.      aspirin 81 mg EC tablet Take 1 tablet (81 mg) by mouth once daily.      atorvastatin (Lipitor) 40 mg tablet Take 1 tablet (40 mg) by mouth once daily. 90 tablet 3    Basaglar KwikPen U-100 Insulin 100 unit/mL (3 mL) pen Inject 12 Units under the skin once daily in the morning.      furosemide (Lasix) 20 mg tablet Take 1 tablet (20 mg) by mouth once daily.      isosorbide mononitrate ER (Imdur) 60 mg 24 hr tablet Take 1 tablet (60 mg) by mouth once daily.      Jardiance 10 mg Take 1 tablet (10 mg) by mouth once daily.      losartan (Cozaar) 25 mg tablet Take 1 tablet (25 mg) by mouth once daily.      metoprolol succinate XL (Toprol-XL) 100 mg 24 hr tablet Take 1 tablet (100 mg) by mouth once daily.      multivitamin tablet Take 1 tablet by mouth once daily.      pantoprazole (ProtoNix) 40 mg EC tablet Take 1 tablet (40 mg) by mouth once daily in the morning. Take before meals.      sacubitriL-valsartan (Entresto) 24-26 mg tablet Take 1 tablet by mouth 2 times a day. 60 tablet 5    spironolactone (Aldactone) 25 mg tablet Take 1 tablet (25 mg) by mouth once daily.      clopidogrel (Plavix) 75 mg tablet Take 1 tablet (75 mg) by mouth once daily.      insulin lispro (HumaLOG U-100 Insulin) 100 unit/mL injection Inject 0.17 mL (17 Units) under the skin 2 times a day with meals. Take as directed per insulin instructions. (Patient not taking: Reported on 4/11/2024) 10.2 mL 11    torsemide (Demadex) 20 mg tablet Take 1 tablet (20 mg) by mouth once  daily. (Patient not taking: Reported on 6/13/2024)      traZODone (Desyrel) 50 mg tablet Take 1 tablet (50 mg) by mouth as needed at bedtime for sleep for up to 7 days. 7 tablet 0    zolpidem (Ambien) 10 mg tablet Take 1 tablet (10 mg) by mouth once daily at bedtime.       No current facility-administered medications for this visit.       Objective     Admission on 03/27/2024, Discharged on 03/30/2024   Component Date Value Ref Range Status    Ventricular Rate 03/27/2024 98  BPM Final    Atrial Rate 03/27/2024 98  BPM Final    IL Interval 03/27/2024 219  ms Final    QRS Duration 03/27/2024 108  ms Final    QT Interval 03/27/2024 372  ms Final    QTC Calculation(Bazett) 03/27/2024 476  ms Final    P Axis 03/27/2024 60  degrees Final    R Axis 03/27/2024 30  degrees Final    T Axis 03/27/2024 134  degrees Final    QRS Count 03/27/2024 15  beats Final    Q Onset 03/27/2024 253  ms Final    T Offset 03/27/2024 439  ms Final    QTC Fredericia 03/27/2024 438  ms Final    WBC 03/27/2024 9.9  4.4 - 11.3 x10*3/uL Final    nRBC 03/27/2024 0.0  0.0 - 0.0 /100 WBCs Final    RBC 03/27/2024 4.23 (L)  4.50 - 5.90 x10*6/uL Final    Hemoglobin 03/27/2024 12.7 (L)  13.5 - 17.5 g/dL Final    Hematocrit 03/27/2024 40.7 (L)  41.0 - 52.0 % Final    MCV 03/27/2024 96  80 - 100 fL Final    MCH 03/27/2024 30.0  26.0 - 34.0 pg Final    MCHC 03/27/2024 31.2 (L)  32.0 - 36.0 g/dL Final    RDW 03/27/2024 16.6 (H)  11.5 - 14.5 % Final    Platelets 03/27/2024 246  150 - 450 x10*3/uL Final    Neutrophils % 03/27/2024 60.7  40.0 - 80.0 % Final    Immature Granulocytes %, Automated 03/27/2024 0.3  0.0 - 0.9 % Final    Lymphocytes % 03/27/2024 31.2  13.0 - 44.0 % Final    Monocytes % 03/27/2024 5.1  2.0 - 10.0 % Final    Eosinophils % 03/27/2024 2.0  0.0 - 6.0 % Final    Basophils % 03/27/2024 0.7  0.0 - 2.0 % Final    Neutrophils Absolute 03/27/2024 6.03 (H)  1.60 - 5.50 x10*3/uL Final    Immature Granulocytes Absolute, Au* 03/27/2024 0.03  0.00 -  0.50 x10*3/uL Final    Lymphocytes Absolute 03/27/2024 3.10 (H)  0.80 - 3.00 x10*3/uL Final    Monocytes Absolute 03/27/2024 0.51  0.05 - 0.80 x10*3/uL Final    Eosinophils Absolute 03/27/2024 0.20  0.00 - 0.40 x10*3/uL Final    Basophils Absolute 03/27/2024 0.07  0.00 - 0.10 x10*3/uL Final    Magnesium 03/27/2024 1.81  1.60 - 2.40 mg/dL Final    Glucose 03/27/2024 262 (H)  74 - 99 mg/dL Final    Sodium 03/27/2024 137  136 - 145 mmol/L Final    Potassium 03/27/2024 4.8  3.5 - 5.3 mmol/L Final    Chloride 03/27/2024 103  98 - 107 mmol/L Final    Bicarbonate 03/27/2024 21  21 - 32 mmol/L Final    Anion Gap 03/27/2024 18  10 - 20 mmol/L Final    Urea Nitrogen 03/27/2024 36 (H)  6 - 23 mg/dL Final    Creatinine 03/27/2024 2.61 (H)  0.50 - 1.30 mg/dL Final    eGFR 03/27/2024 25 (L)  >60 mL/min/1.73m*2 Final    Calcium 03/27/2024 9.7  8.6 - 10.3 mg/dL Final    Albumin 03/27/2024 4.2  3.4 - 5.0 g/dL Final    Alkaline Phosphatase 03/27/2024 58  33 - 136 U/L Final    Total Protein 03/27/2024 7.8  6.4 - 8.2 g/dL Final    AST 03/27/2024 19  9 - 39 U/L Final    Bilirubin, Total 03/27/2024 0.7  0.0 - 1.2 mg/dL Final    ALT 03/27/2024 14  10 - 52 U/L Final    Phosphorus 03/27/2024 4.3  2.5 - 4.9 mg/dL Final    Protime 03/27/2024 12.1  9.8 - 12.8 seconds Final    INR 03/27/2024 1.1  0.9 - 1.1 Final    aPTT 03/27/2024 28  27 - 38 seconds Final    BNP 03/27/2024 2,456 (H)  0 - 99 pg/mL Final    ABO TYPE 03/27/2024 B   Final    Rh TYPE 03/27/2024 POS   Final    ANTIBODY SCREEN 03/27/2024 NEG   Final    Troponin I, High Sensitivity 03/27/2024 44 (H)  0 - 20 ng/L Final    Troponin I, High Sensitivity 03/27/2024 134 (HH)  0 - 20 ng/L Final    Troponin I, High Sensitivity 03/27/2024 1,075 (HH)  0 - 20 ng/L Final    POCT Glucose 03/27/2024 169 (H)  74 - 99 mg/dL Final    Color, Urine 03/27/2024 Straw  Straw, Yellow Final    Appearance, Urine 03/27/2024 Clear  Clear Final    Specific Gravity, Urine 03/27/2024 1.008  1.005 - 1.035 Final     pH, Urine 03/27/2024 5.0  5.0, 5.5, 6.0, 6.5, 7.0, 7.5, 8.0 Final    Protein, Urine 03/27/2024 100 (2+) (N)  NEGATIVE mg/dL Final    Glucose, Urine 03/27/2024 50 (1+) (A)  NEGATIVE mg/dL Final    Blood, Urine 03/27/2024 SMALL (1+) (A)  NEGATIVE Final    Ketones, Urine 03/27/2024 NEGATIVE  NEGATIVE mg/dL Final    Bilirubin, Urine 03/27/2024 NEGATIVE  NEGATIVE Final    Urobilinogen, Urine 03/27/2024 <2.0  <2.0 mg/dL Final    Nitrite, Urine 03/27/2024 NEGATIVE  NEGATIVE Final    Leukocyte Esterase, Urine 03/27/2024 NEGATIVE  NEGATIVE Final    Ventricular Rate 03/27/2024 81  BPM Final    Atrial Rate 03/27/2024 96  BPM Final    MO Interval 03/27/2024 216  ms Final    QRS Duration 03/27/2024 82  ms Final    QT Interval 03/27/2024 652  ms Final    QTC Calculation(Bazett) 03/27/2024 757  ms Final    P Axis 03/27/2024 61  degrees Final    R Axis 03/27/2024 -65  degrees Final    T Axis 03/27/2024 68  degrees Final    QRS Count 03/27/2024 14  beats Final    Q Onset 03/27/2024 214  ms Final    P Onset 03/27/2024 106  ms Final    P Offset 03/27/2024 130  ms Final    T Offset 03/27/2024 540  ms Final    QTC Fredericia 03/27/2024 720  ms Final    AV pk jameel 03/27/2024 1.25  m/s Final    AV mn grad 03/27/2024 4.0  mmHg Final    LVOT diam 03/27/2024 1.80  cm Final    LV EF 03/27/2024 34  % Final    MV E/A ratio 03/27/2024 1.63   Final    LA vol index A/L 03/27/2024 34.2  ml/m2 Final    Tricuspid annular plane systolic e* 03/27/2024 1.0  cm Final    RV free wall pk S' 03/27/2024 9.25  cm/s Final    LVIDd 03/27/2024 5.08  cm Final    RVSP 03/27/2024 41.4  mmHg Final    Aortic Valve Area by Continuity of* 03/27/2024 2.11  cm2 Final    Aortic Valve Area by Continuity of* 03/27/2024 2.00  cm2 Final    AV pk grad 03/27/2024 6.3  mmHg Final    LV A4C EF 03/27/2024 36.0   Final    WBC, Urine 03/27/2024 NONE  1-5, NONE /HPF Final    RBC, Urine 03/27/2024 NONE  NONE, 1-2, 3-5 /HPF Final    Bacteria, Urine 03/27/2024 1+ (A)  NONE SEEN /HPF  Final    Hyaline Casts, Urine 03/27/2024 2+ (A)  NONE /LPF Final    POCT Glucose 03/27/2024 184 (H)  74 - 99 mg/dL Final    WBC 03/28/2024 7.8  4.4 - 11.3 x10*3/uL Final    nRBC 03/28/2024 0.0  0.0 - 0.0 /100 WBCs Final    RBC 03/28/2024 3.79 (L)  4.50 - 5.90 x10*6/uL Final    Hemoglobin 03/28/2024 11.7 (L)  13.5 - 17.5 g/dL Final    Hematocrit 03/28/2024 35.8 (L)  41.0 - 52.0 % Final    MCV 03/28/2024 95  80 - 100 fL Final    MCH 03/28/2024 30.9  26.0 - 34.0 pg Final    MCHC 03/28/2024 32.7  32.0 - 36.0 g/dL Final    RDW 03/28/2024 16.6 (H)  11.5 - 14.5 % Final    Platelets 03/28/2024 204  150 - 450 x10*3/uL Final    Glucose 03/28/2024 150 (H)  74 - 99 mg/dL Final    Sodium 03/28/2024 138  136 - 145 mmol/L Final    Potassium 03/28/2024 4.4  3.5 - 5.3 mmol/L Final    Chloride 03/28/2024 105  98 - 107 mmol/L Final    Bicarbonate 03/28/2024 22  21 - 32 mmol/L Final    Anion Gap 03/28/2024 15  10 - 20 mmol/L Final    Urea Nitrogen 03/28/2024 41 (H)  6 - 23 mg/dL Final    Creatinine 03/28/2024 2.79 (H)  0.50 - 1.30 mg/dL Final    eGFR 03/28/2024 23 (L)  >60 mL/min/1.73m*2 Final    Calcium 03/28/2024 9.3  8.6 - 10.3 mg/dL Final    POCT Glucose 03/27/2024 173 (H)  74 - 99 mg/dL Final    Extra Tube 03/28/2024 Hold for add-ons.   Final    POCT Glucose 03/28/2024 158 (H)  74 - 99 mg/dL Final    POCT Glucose 03/28/2024 177 (H)  74 - 99 mg/dL Final    POCT Glucose 03/28/2024 124 (H)  74 - 99 mg/dL Final    Glucose 03/29/2024 105 (H)  74 - 99 mg/dL Final    Sodium 03/29/2024 139  136 - 145 mmol/L Final    Potassium 03/29/2024 4.0  3.5 - 5.3 mmol/L Final    Chloride 03/29/2024 105  98 - 107 mmol/L Final    Bicarbonate 03/29/2024 25  21 - 32 mmol/L Final    Anion Gap 03/29/2024 13  10 - 20 mmol/L Final    Urea Nitrogen 03/29/2024 44 (H)  6 - 23 mg/dL Final    Creatinine 03/29/2024 2.74 (H)  0.50 - 1.30 mg/dL Final    eGFR 03/29/2024 23 (L)  >60 mL/min/1.73m*2 Final    Calcium 03/29/2024 8.7  8.6 - 10.3 mg/dL Final    POCT Glucose  03/28/2024 156 (H)  74 - 99 mg/dL Final    POCT Glucose 03/29/2024 119 (H)  74 - 99 mg/dL Final    Extra Tube 03/29/2024 Hold for add-ons.   Final    POCT Glucose 03/29/2024 187 (H)  74 - 99 mg/dL Final    POCT Glucose 03/29/2024 155 (H)  74 - 99 mg/dL Final    Glucose 03/30/2024 137 (H)  74 - 99 mg/dL Final    Sodium 03/30/2024 137  136 - 145 mmol/L Final    Potassium 03/30/2024 4.1  3.5 - 5.3 mmol/L Final    Chloride 03/30/2024 102  98 - 107 mmol/L Final    Bicarbonate 03/30/2024 25  21 - 32 mmol/L Final    Anion Gap 03/30/2024 14  10 - 20 mmol/L Final    Urea Nitrogen 03/30/2024 46 (H)  6 - 23 mg/dL Final    Creatinine 03/30/2024 2.61 (H)  0.50 - 1.30 mg/dL Final    eGFR 03/30/2024 25 (L)  >60 mL/min/1.73m*2 Final    Calcium 03/30/2024 8.4 (L)  8.6 - 10.3 mg/dL Final    POCT Glucose 03/29/2024 169 (H)  74 - 99 mg/dL Final    Extra Tube 03/30/2024 Hold for add-ons.   Final    POCT Glucose 03/30/2024 131 (H)  74 - 99 mg/dL Final    POCT Glucose 03/30/2024 181 (H)  74 - 99 mg/dL Final   Office Visit on 03/20/2024   Component Date Value Ref Range Status    POC Triplex SARS-CoV-2 Ag 03/20/2024 Presumptive negative for Triplex SARS-CoV-2 (no antigen detected)  Presumptive negative for Triplex SARS-CoV-2 (no antigen detected) Final    POC Triplex Flu A-Ag 03/20/2024 Presumptive negative for Triplex FLU A (no antigen detected)  Presumptive negative for Triplex FLU A (no antigen detected) Final    POC Triplex Flu B-Ag 03/20/2024 Presumptive negative for Triplex FLU B (no antigen detected)  Presumptive negative for Triplex FLU B (no antigen detected) Final   Lab Requisition on 02/22/2024   Component Date Value Ref Range Status    Case Report 02/22/2024    Final                    Value:Medical Cytology Report                           Case: [id number]                                  Authorizing Provider:  Maxwell Cunningham, Collected:           02/22/2024 Nancy CHRISTIE                                                                            Ordering Location:     [facility]       Received:            02/25/2024 1852                                     Center                                                                       Specimen:    PLEURAL FLUID RIGHT SIDE                                                                   Gross Description 02/22/2024    Final                    Value:This result contains rich text formatting which cannot be displayed here.    Specimen Processing Detail 02/22/2024    Final                    Value:This result contains rich text formatting which cannot be displayed here.       Radiology: Reviewed imaging in powerchart.  No results found.    No family history on file.  Social History     Socioeconomic History    Marital status:      Spouse name: None    Number of children: None    Years of education: None    Highest education level: None   Occupational History    None   Tobacco Use    Smoking status: Never    Smokeless tobacco: Never   Substance and Sexual Activity    Alcohol use: Not Currently    Drug use: None    Sexual activity: None   Other Topics Concern    None   Social History Narrative    None     Social Determinants of Health     Financial Resource Strain: Low Risk  (3/27/2024)    Overall Financial Resource Strain (CARDIA)     Difficulty of Paying Living Expenses: Not hard at all   Food Insecurity: No Food Insecurity (12/4/2023)    Received from [facility]    Hunger Vital Sign     Worried About Running Out of Food in the Last Year: Never true     Ran Out of Food in the Last Year: Never true   Transportation Needs: No Transportation Needs (3/27/2024)    PRAPARE - Transportation     Lack of Transportation (Medical): No     Lack of Transportation (Non-Medical): No   Physical Activity: Not on File (2/26/2021)    Received from [facility]     Physical Activity     Physical Activity: 0   Stress: Not on File (2/26/2021)    Received from [facility]      Stress     Stress: 0   Social Connections: Not on File (2/26/2021)    Received from [facility]     Social Connections and Isolation: 0   Intimate Partner Violence: Not At Risk (12/4/2023)    Received from [facility]    Humiliation, Afraid, Rape, and Kick questionnaire     Fear of Current or Ex-Partner: No     Emotionally Abused: No     Physically Abused: No     Sexually Abused: No   Housing Stability: Low Risk  (3/27/2024)    Housing Stability Vital Sign     Unable to Pay for Housing in the Last Year: No     Number of Places Lived in the Last Year: 1     Unstable Housing in the Last Year: No     Past Medical History:   Diagnosis Date    Acute cystitis with hematuria 12/31/2016    Acute cystitis with hematuria    Personal history of other diseases of the circulatory system     History of hypertension    Personal history of other diseases of the circulatory system     History of cardiac disorder    Personal history of other endocrine, nutritional and metabolic disease     History of diabetic neuropathy    Personal history of other endocrine, nutritional and metabolic disease     History of hyperlipidemia    Personal history of other endocrine, nutritional and metabolic disease     History of type 2 diabetes mellitus    Personal history of other specified conditions     History of insomnia     Past Surgical History:   Procedure Laterality Date    OTHER SURGICAL HISTORY  09/17/2019    Bypass    OTHER SURGICAL HISTORY  09/17/2019    Arterial stent placement       Charting was completed using voice recognition technology and may include unintended errors.

## 2024-06-14 ENCOUNTER — TELEPHONE (OUTPATIENT)
Dept: PRIMARY CARE | Facility: CLINIC | Age: 74
End: 2024-06-14
Payer: MEDICARE

## 2024-06-14 NOTE — TELEPHONE ENCOUNTER
Lmtcb.   Dr. De La Paz reviewed labs - BUN and creatinine mildly increased. Refer to Dr. Wayne De La Paz nephrologist for eval.

## 2024-06-18 NOTE — TELEPHONE ENCOUNTER
Spoke with pt. Regarding results. He said that he already has an appt. With Dr. Roa Nephrologist.

## 2024-07-03 ENCOUNTER — LAB (OUTPATIENT)
Dept: LAB | Facility: LAB | Age: 74
End: 2024-07-03
Payer: MEDICARE

## 2024-07-03 ENCOUNTER — OFFICE VISIT (OUTPATIENT)
Dept: PRIMARY CARE | Facility: CLINIC | Age: 74
End: 2024-07-03
Payer: MEDICARE

## 2024-07-03 ENCOUNTER — HOSPITAL ENCOUNTER (OUTPATIENT)
Dept: RADIOLOGY | Facility: EXTERNAL LOCATION | Age: 74
Discharge: HOME | End: 2024-07-03

## 2024-07-03 VITALS
DIASTOLIC BLOOD PRESSURE: 70 MMHG | HEART RATE: 79 BPM | SYSTOLIC BLOOD PRESSURE: 144 MMHG | OXYGEN SATURATION: 95 % | BODY MASS INDEX: 26.2 KG/M2 | HEIGHT: 66 IN | WEIGHT: 163 LBS

## 2024-07-03 DIAGNOSIS — M10.372 ACUTE GOUT DUE TO RENAL IMPAIRMENT INVOLVING TOE OF LEFT FOOT: ICD-10-CM

## 2024-07-03 DIAGNOSIS — M10.372 ACUTE GOUT DUE TO RENAL IMPAIRMENT INVOLVING TOE OF LEFT FOOT: Primary | ICD-10-CM

## 2024-07-03 LAB — URATE SERPL-MCNC: 5 MG/DL (ref 4–7.5)

## 2024-07-03 PROCEDURE — 3078F DIAST BP <80 MM HG: CPT | Performed by: STUDENT IN AN ORGANIZED HEALTH CARE EDUCATION/TRAINING PROGRAM

## 2024-07-03 PROCEDURE — 1160F RVW MEDS BY RX/DR IN RCRD: CPT | Performed by: STUDENT IN AN ORGANIZED HEALTH CARE EDUCATION/TRAINING PROGRAM

## 2024-07-03 PROCEDURE — 3077F SYST BP >= 140 MM HG: CPT | Performed by: STUDENT IN AN ORGANIZED HEALTH CARE EDUCATION/TRAINING PROGRAM

## 2024-07-03 PROCEDURE — 99213 OFFICE O/P EST LOW 20 MIN: CPT | Performed by: STUDENT IN AN ORGANIZED HEALTH CARE EDUCATION/TRAINING PROGRAM

## 2024-07-03 PROCEDURE — 4010F ACE/ARB THERAPY RXD/TAKEN: CPT | Performed by: STUDENT IN AN ORGANIZED HEALTH CARE EDUCATION/TRAINING PROGRAM

## 2024-07-03 PROCEDURE — 36415 COLL VENOUS BLD VENIPUNCTURE: CPT

## 2024-07-03 PROCEDURE — 1159F MED LIST DOCD IN RCRD: CPT | Performed by: STUDENT IN AN ORGANIZED HEALTH CARE EDUCATION/TRAINING PROGRAM

## 2024-07-03 PROCEDURE — 84550 ASSAY OF BLOOD/URIC ACID: CPT

## 2024-07-03 RX ORDER — METHYLPREDNISOLONE 4 MG/1
TABLET ORAL
Qty: 21 TABLET | Refills: 0 | Status: SHIPPED | OUTPATIENT
Start: 2024-07-03 | End: 2024-07-10

## 2024-07-03 NOTE — PROGRESS NOTES
Subjective   Patient ID: Fredrick De La Paz is a 73 y.o. male who presents for follow up after his recent hospital admission with CHF exacerbation    AMW: 3/12/24; see chart     Assessment/Plan     Acute Medical Issues    L Foot, 1st Digit Pain  -Possibly gout. No bony abnormality felt.   -Advised to avoid NSAIDs  PLAN  -XR L foot  -Uric acid level  -Medrol dose pack rx       Chronic Medical Issues     Preventative Medicine  -UTD on vaccines   -Had colonoscopy done recently per patient. Results to be brought  -PSA checked by previous PCP. States that it was normal but has a hx of BPH.   -PHQ2: 0  -Alcohol screening done    CHF/HFrEF (EF 25%)  CAD  -Echo on 3/27/24 shows EF 25%  -Was prescribed GDMT but was unable to afford Entresto.  Continues to take Jardiance 10 mg p.o. daily  -Has frequent exacerbations  -Had cardiac cath  in March 2024.  No intervention done at that    PLAN  -Currently on losartan 25 mg daily as he cannot afford Entresto.  -Continue torsemide, jardiance, metoprolol, spironolactone   -Refer to EP for AICD. Given number to EP at [facility]  during last appointment,Still has not made an appointment.  -Referral given to Dr. Cobb for general cardiology follow up. Was seen at  HF clinic recently but is declining to go back to [facility].   -Declining cardiac rehab     Pulmonary Nodule  -Seen in CT chest from March 2024  -Recommend 1 year repeat CT     VALERIE  -Hx of previous remote stenting and balloon angioplasty  -Continue to monitor renal function  -Follows with Dr Casillas (vascular surgery)     T2DM  -Regarding his DM, he is on Basalglar 10 units in the morning, Humalog 15 units with each meal (as his insurance covers humalog but not novolog). Last A1c was 8.0 while at [facility]. He is compliant with home medications at this time. States that he eats a high carb diet. Advised patient to decrease carb intake  -Will increase humalog to 17U BIDWM and basaglar to 12U qAM.     CKD-III  -Continue Arb    -Jardiance  -Avoid nephrotoxic medications   -CMP ordered     Insomnia   -Continue ambien  -Last filled on 6/6 by Dr JAMIL Whitmore    HTN  -Controlled  -Continue ARB, norvasc and metoprolol   -Low salt diet     OARRS Report Reviewed and appropriate.  UDS reviewed.  I have personally reviewed the OARRS report.  I have considered the risks of abuse, dependence, addiction and diversion. I believe that it is clinically appropriate for this patient to be prescribed this medication based on documented diagnosis.     Controlled Substance Agreement:   I have printed this form and reviewed each line item with the patient and the patient has verbalized understanding.   Date of the last Controlled Substance Agreement: 2/27/24  Date of UDS To be obtained    I discussed patient's OARRS report as well as the potential concern for overdose on prescribed opioid, sleep aid, gabapentin/Lyrica, and/or benzodiazepines. I explained that Overdose Risk Scores >460, require a Narcan prescription and places the patient at risk for potential death.      Patient understands that the risk of death can occur when using opioid, benzodiazepines, gabapentin, Lyrica, sleep aids, and illegal drugs. The risk of death especially increases when using the above medications and or illegal drugs in combination. I have reviewed with the patient and the patient is in agreement with the terms of the  Controlled Substance Agreement.      At this point in time, patient is in compliance with the above, and has no signs of dependence or addiction to the above prescribed medications.          Toe Pain     73M presents for acute sick visit. He was admitted to [facility] from 3/27 to 3/30/2024.  Patient went to [place] for evaluation of shortness of breath.  Was found to be in acute CHF exacerbation and was treated with IV diuresis.  Stay was complicated by cardiorenal syndrome and nephrology was consulted.  Patient also underwent echocardiogram during that  "hospitalization and was found an EF of 25%.  He also underwent diagnostic cath but no intervention was done.  Once his shortness of breath improved after diuresis patient was discharged home.      1-2 days of acute L foot, 1st digit pain. No trauma, falls, injury to the area. Has tried advil but it has not helped. Advised to stop advil due to CKD. He is agreeable. Denies fevers, chills, weight loss, lightheadedness, dizziness, vision changes, sore throat, runny nose, CP, SOB, cough, palpitations, n/v/d, abd pain, black/bloody stools, mood disturbance, or new numbness/weakness/tingling in arms/legs/face.      All complaints and concerns addressed.      PMH: CKD, HFrEF, CAD, Insomnia, HTN, T2DM, VALERIE  Surgeries: PCI, renal artery stenting, PAD with stenting.     Social hx: Non smoker, denies Etoh drinking, no drugs, he med school lecturer, and is still working online part time      Denies fevers, chills, weight loss, lightheadedness, dizziness, vision changes, sore throat, runny nose, CP, cough, palpitations, n/v/d, abd pain, black/bloody stools, arthralgias, or new numbness/weakness/tingling in arms/legs/face.        Visit Vitals  /70   Pulse 79   Ht 1.676 m (5' 6\")   Wt 73.9 kg (163 lb)   SpO2 95%   BMI 26.31 kg/m²   Smoking Status Never   BSA 1.85 m²     PHYSICAL EXAM     Physical Exam     Visit Vitals  /70   Pulse 79   Ht 1.676 m (5' 6\")   Wt 73.9 kg (163 lb)   SpO2 95%   BMI 26.31 kg/m²   Smoking Status Never   BSA 1.85 m²        General: NAD. NCAT. Aox3   HEENT: PERRLA. EOMI. MMM. Nares patent bl.  Cardiovascular: RRR. S1/S2 wnl. No JVD.   Respiratory: CTABL. No acute respiratory distress. No crackles  GI: Soft, NT abdomen.   MSK: ROM x 4. Weakness is improved.   Extremities: No BLLE edema. baseline. L great toe is erythematous and swollen. TTP.   Skin: No rashes or bruises.   Neuro: Aox3. Cranial Nerves grossly intact. Motor/sensory wnl.   Psych: Mood wnl.       REVIEW OF SYSTEMS     ROS in HPI   No " Known Allergies    Current Outpatient Medications   Medication Sig Dispense Refill    allopurinol (Zyloprim) 100 mg tablet Take 1 tablet (100 mg) by mouth once daily. 90 tablet 3    amLODIPine (Norvasc) 10 mg tablet Take 1 tablet (10 mg) by mouth once daily. 30 tablet 3    aspirin 81 mg EC tablet Take 1 tablet (81 mg) by mouth once daily.      atorvastatin (Lipitor) 40 mg tablet Take 1 tablet (40 mg) by mouth once daily. 90 tablet 3    Basaglar KwikPen U-100 Insulin 100 unit/mL (3 mL) pen Inject 12 Units under the skin once daily in the morning.      clopidogrel (Plavix) 75 mg tablet Take 1 tablet (75 mg) by mouth once daily.      furosemide (Lasix) 20 mg tablet Take 1 tablet (20 mg) by mouth once daily.      insulin lispro (HumaLOG U-100 Insulin) 100 unit/mL injection Inject 0.17 mL (17 Units) under the skin 2 times daily (morning and late afternoon). Take as directed per insulin instructions. 10.2 mL 11    isosorbide mononitrate ER (Imdur) 60 mg 24 hr tablet Take 1 tablet (60 mg) by mouth once daily.      Jardiance 10 mg Take 1 tablet (10 mg) by mouth once daily.      losartan (Cozaar) 25 mg tablet Take 1 tablet (25 mg) by mouth once daily.      metoprolol succinate XL (Toprol-XL) 100 mg 24 hr tablet Take 1 tablet (100 mg) by mouth once daily.      multivitamin tablet Take 1 tablet by mouth once daily.      pantoprazole (ProtoNix) 40 mg EC tablet Take 1 tablet (40 mg) by mouth once daily in the morning. Take before meals.      sacubitriL-valsartan (Entresto) 24-26 mg tablet Take 1 tablet by mouth 2 times a day. 60 tablet 5    spironolactone (Aldactone) 25 mg tablet Take 1 tablet (25 mg) by mouth once daily.      torsemide (Demadex) 20 mg tablet Take 1 tablet (20 mg) by mouth once daily. (Patient not taking: Reported on 6/13/2024)      traZODone (Desyrel) 50 mg tablet Take 1 tablet (50 mg) by mouth as needed at bedtime for sleep for up to 7 days. 7 tablet 0    zolpidem (Ambien) 10 mg tablet Take 1 tablet (10 mg) by  mouth once daily at bedtime.       No current facility-administered medications for this visit.       Objective     Admission on 03/27/2024, Discharged on 03/30/2024   Component Date Value Ref Range Status    Ventricular Rate 03/27/2024 98  BPM Final    Atrial Rate 03/27/2024 98  BPM Final    CT Interval 03/27/2024 219  ms Final    QRS Duration 03/27/2024 108  ms Final    QT Interval 03/27/2024 372  ms Final    QTC Calculation(Bazett) 03/27/2024 476  ms Final    P Axis 03/27/2024 60  degrees Final    R Axis 03/27/2024 30  degrees Final    T Axis 03/27/2024 134  degrees Final    QRS Count 03/27/2024 15  beats Final    Q Onset 03/27/2024 253  ms Final    T Offset 03/27/2024 439  ms Final    QTC Fredericia 03/27/2024 438  ms Final    WBC 03/27/2024 9.9  4.4 - 11.3 x10*3/uL Final    nRBC 03/27/2024 0.0  0.0 - 0.0 /100 WBCs Final    RBC 03/27/2024 4.23 (L)  4.50 - 5.90 x10*6/uL Final    Hemoglobin 03/27/2024 12.7 (L)  13.5 - 17.5 g/dL Final    Hematocrit 03/27/2024 40.7 (L)  41.0 - 52.0 % Final    MCV 03/27/2024 96  80 - 100 fL Final    MCH 03/27/2024 30.0  26.0 - 34.0 pg Final    MCHC 03/27/2024 31.2 (L)  32.0 - 36.0 g/dL Final    RDW 03/27/2024 16.6 (H)  11.5 - 14.5 % Final    Platelets 03/27/2024 246  150 - 450 x10*3/uL Final    Neutrophils % 03/27/2024 60.7  40.0 - 80.0 % Final    Immature Granulocytes %, Automated 03/27/2024 0.3  0.0 - 0.9 % Final    Lymphocytes % 03/27/2024 31.2  13.0 - 44.0 % Final    Monocytes % 03/27/2024 5.1  2.0 - 10.0 % Final    Eosinophils % 03/27/2024 2.0  0.0 - 6.0 % Final    Basophils % 03/27/2024 0.7  0.0 - 2.0 % Final    Neutrophils Absolute 03/27/2024 6.03 (H)  1.60 - 5.50 x10*3/uL Final    Immature Granulocytes Absolute, Au* 03/27/2024 0.03  0.00 - 0.50 x10*3/uL Final    Lymphocytes Absolute 03/27/2024 3.10 (H)  0.80 - 3.00 x10*3/uL Final    Monocytes Absolute 03/27/2024 0.51  0.05 - 0.80 x10*3/uL Final    Eosinophils Absolute 03/27/2024 0.20  0.00 - 0.40 x10*3/uL Final    Basophils  Absolute 03/27/2024 0.07  0.00 - 0.10 x10*3/uL Final    Magnesium 03/27/2024 1.81  1.60 - 2.40 mg/dL Final    Glucose 03/27/2024 262 (H)  74 - 99 mg/dL Final    Sodium 03/27/2024 137  136 - 145 mmol/L Final    Potassium 03/27/2024 4.8  3.5 - 5.3 mmol/L Final    Chloride 03/27/2024 103  98 - 107 mmol/L Final    Bicarbonate 03/27/2024 21  21 - 32 mmol/L Final    Anion Gap 03/27/2024 18  10 - 20 mmol/L Final    Urea Nitrogen 03/27/2024 36 (H)  6 - 23 mg/dL Final    Creatinine 03/27/2024 2.61 (H)  0.50 - 1.30 mg/dL Final    eGFR 03/27/2024 25 (L)  >60 mL/min/1.73m*2 Final    Calcium 03/27/2024 9.7  8.6 - 10.3 mg/dL Final    Albumin 03/27/2024 4.2  3.4 - 5.0 g/dL Final    Alkaline Phosphatase 03/27/2024 58  33 - 136 U/L Final    Total Protein 03/27/2024 7.8  6.4 - 8.2 g/dL Final    AST 03/27/2024 19  9 - 39 U/L Final    Bilirubin, Total 03/27/2024 0.7  0.0 - 1.2 mg/dL Final    ALT 03/27/2024 14  10 - 52 U/L Final    Phosphorus 03/27/2024 4.3  2.5 - 4.9 mg/dL Final    Protime 03/27/2024 12.1  9.8 - 12.8 seconds Final    INR 03/27/2024 1.1  0.9 - 1.1 Final    aPTT 03/27/2024 28  27 - 38 seconds Final    BNP 03/27/2024 2,456 (H)  0 - 99 pg/mL Final    ABO TYPE 03/27/2024 B   Final    Rh TYPE 03/27/2024 POS   Final    ANTIBODY SCREEN 03/27/2024 NEG   Final    Troponin I, High Sensitivity 03/27/2024 44 (H)  0 - 20 ng/L Final    Troponin I, High Sensitivity 03/27/2024 134 (HH)  0 - 20 ng/L Final    Troponin I, High Sensitivity 03/27/2024 1,075 (HH)  0 - 20 ng/L Final    POCT Glucose 03/27/2024 169 (H)  74 - 99 mg/dL Final    Color, Urine 03/27/2024 Straw  Straw, Yellow Final    Appearance, Urine 03/27/2024 Clear  Clear Final    Specific Gravity, Urine 03/27/2024 1.008  1.005 - 1.035 Final    pH, Urine 03/27/2024 5.0  5.0, 5.5, 6.0, 6.5, 7.0, 7.5, 8.0 Final    Protein, Urine 03/27/2024 100 (2+) (N)  NEGATIVE mg/dL Final    Glucose, Urine 03/27/2024 50 (1+) (A)  NEGATIVE mg/dL Final    Blood, Urine 03/27/2024 SMALL (1+) (A)   NEGATIVE Final    Ketones, Urine 03/27/2024 NEGATIVE  NEGATIVE mg/dL Final    Bilirubin, Urine 03/27/2024 NEGATIVE  NEGATIVE Final    Urobilinogen, Urine 03/27/2024 <2.0  <2.0 mg/dL Final    Nitrite, Urine 03/27/2024 NEGATIVE  NEGATIVE Final    Leukocyte Esterase, Urine 03/27/2024 NEGATIVE  NEGATIVE Final    Ventricular Rate 03/27/2024 81  BPM Final    Atrial Rate 03/27/2024 96  BPM Final    MS Interval 03/27/2024 216  ms Final    QRS Duration 03/27/2024 82  ms Final    QT Interval 03/27/2024 652  ms Final    QTC Calculation(Bazett) 03/27/2024 757  ms Final    P Axis 03/27/2024 61  degrees Final    R Axis 03/27/2024 -65  degrees Final    T Axis 03/27/2024 68  degrees Final    QRS Count 03/27/2024 14  beats Final    Q Onset 03/27/2024 214  ms Final    P Onset 03/27/2024 106  ms Final    P Offset 03/27/2024 130  ms Final    T Offset 03/27/2024 540  ms Final    QTC Fredericia 03/27/2024 720  ms Final    AV pk jameel 03/27/2024 1.25  m/s Final    AV mn grad 03/27/2024 4.0  mmHg Final    LVOT diam 03/27/2024 1.80  cm Final    LV EF 03/27/2024 34  % Final    MV E/A ratio 03/27/2024 1.63   Final    LA vol index A/L 03/27/2024 34.2  ml/m2 Final    Tricuspid annular plane systolic e* 03/27/2024 1.0  cm Final    RV free wall pk S' 03/27/2024 9.25  cm/s Final    LVIDd 03/27/2024 5.08  cm Final    RVSP 03/27/2024 41.4  mmHg Final    Aortic Valve Area by Continuity of* 03/27/2024 2.11  cm2 Final    Aortic Valve Area by Continuity of* 03/27/2024 2.00  cm2 Final    AV pk grad 03/27/2024 6.3  mmHg Final    LV A4C EF 03/27/2024 36.0   Final    WBC, Urine 03/27/2024 NONE  1-5, NONE /HPF Final    RBC, Urine 03/27/2024 NONE  NONE, 1-2, 3-5 /HPF Final    Bacteria, Urine 03/27/2024 1+ (A)  NONE SEEN /HPF Final    Hyaline Casts, Urine 03/27/2024 2+ (A)  NONE /LPF Final    POCT Glucose 03/27/2024 184 (H)  74 - 99 mg/dL Final    WBC 03/28/2024 7.8  4.4 - 11.3 x10*3/uL Final    nRBC 03/28/2024 0.0  0.0 - 0.0 /100 WBCs Final    RBC 03/28/2024  3.79 (L)  4.50 - 5.90 x10*6/uL Final    Hemoglobin 03/28/2024 11.7 (L)  13.5 - 17.5 g/dL Final    Hematocrit 03/28/2024 35.8 (L)  41.0 - 52.0 % Final    MCV 03/28/2024 95  80 - 100 fL Final    MCH 03/28/2024 30.9  26.0 - 34.0 pg Final    MCHC 03/28/2024 32.7  32.0 - 36.0 g/dL Final    RDW 03/28/2024 16.6 (H)  11.5 - 14.5 % Final    Platelets 03/28/2024 204  150 - 450 x10*3/uL Final    Glucose 03/28/2024 150 (H)  74 - 99 mg/dL Final    Sodium 03/28/2024 138  136 - 145 mmol/L Final    Potassium 03/28/2024 4.4  3.5 - 5.3 mmol/L Final    Chloride 03/28/2024 105  98 - 107 mmol/L Final    Bicarbonate 03/28/2024 22  21 - 32 mmol/L Final    Anion Gap 03/28/2024 15  10 - 20 mmol/L Final    Urea Nitrogen 03/28/2024 41 (H)  6 - 23 mg/dL Final    Creatinine 03/28/2024 2.79 (H)  0.50 - 1.30 mg/dL Final    eGFR 03/28/2024 23 (L)  >60 mL/min/1.73m*2 Final    Calcium 03/28/2024 9.3  8.6 - 10.3 mg/dL Final    POCT Glucose 03/27/2024 173 (H)  74 - 99 mg/dL Final    Extra Tube 03/28/2024 Hold for add-ons.   Final    POCT Glucose 03/28/2024 158 (H)  74 - 99 mg/dL Final    POCT Glucose 03/28/2024 177 (H)  74 - 99 mg/dL Final    POCT Glucose 03/28/2024 124 (H)  74 - 99 mg/dL Final    Glucose 03/29/2024 105 (H)  74 - 99 mg/dL Final    Sodium 03/29/2024 139  136 - 145 mmol/L Final    Potassium 03/29/2024 4.0  3.5 - 5.3 mmol/L Final    Chloride 03/29/2024 105  98 - 107 mmol/L Final    Bicarbonate 03/29/2024 25  21 - 32 mmol/L Final    Anion Gap 03/29/2024 13  10 - 20 mmol/L Final    Urea Nitrogen 03/29/2024 44 (H)  6 - 23 mg/dL Final    Creatinine 03/29/2024 2.74 (H)  0.50 - 1.30 mg/dL Final    eGFR 03/29/2024 23 (L)  >60 mL/min/1.73m*2 Final    Calcium 03/29/2024 8.7  8.6 - 10.3 mg/dL Final    POCT Glucose 03/28/2024 156 (H)  74 - 99 mg/dL Final    POCT Glucose 03/29/2024 119 (H)  74 - 99 mg/dL Final    Extra Tube 03/29/2024 Hold for add-ons.   Final    POCT Glucose 03/29/2024 187 (H)  74 - 99 mg/dL Final    POCT Glucose 03/29/2024 155 (H)   74 - 99 mg/dL Final    Glucose 03/30/2024 137 (H)  74 - 99 mg/dL Final    Sodium 03/30/2024 137  136 - 145 mmol/L Final    Potassium 03/30/2024 4.1  3.5 - 5.3 mmol/L Final    Chloride 03/30/2024 102  98 - 107 mmol/L Final    Bicarbonate 03/30/2024 25  21 - 32 mmol/L Final    Anion Gap 03/30/2024 14  10 - 20 mmol/L Final    Urea Nitrogen 03/30/2024 46 (H)  6 - 23 mg/dL Final    Creatinine 03/30/2024 2.61 (H)  0.50 - 1.30 mg/dL Final    eGFR 03/30/2024 25 (L)  >60 mL/min/1.73m*2 Final    Calcium 03/30/2024 8.4 (L)  8.6 - 10.3 mg/dL Final    POCT Glucose 03/29/2024 169 (H)  74 - 99 mg/dL Final    Extra Tube 03/30/2024 Hold for add-ons.   Final    POCT Glucose 03/30/2024 131 (H)  74 - 99 mg/dL Final    POCT Glucose 03/30/2024 181 (H)  74 - 99 mg/dL Final   Office Visit on 03/20/2024   Component Date Value Ref Range Status    POC Triplex SARS-CoV-2 Ag 03/20/2024 Presumptive negative for Triplex SARS-CoV-2 (no antigen detected)  Presumptive negative for Triplex SARS-CoV-2 (no antigen detected) Final    POC Triplex Flu A-Ag 03/20/2024 Presumptive negative for Triplex FLU A (no antigen detected)  Presumptive negative for Triplex FLU A (no antigen detected) Final    POC Triplex Flu B-Ag 03/20/2024 Presumptive negative for Triplex FLU B (no antigen detected)  Presumptive negative for Triplex FLU B (no antigen detected) Final       Radiology: Reviewed imaging in powerchart.  No results found.    No family history on file.  Social History     Socioeconomic History    Marital status:      Spouse name: Not on file    Number of children: Not on file    Years of education: Not on file    Highest education level: Not on file   Occupational History    Not on file   Tobacco Use    Smoking status: Never    Smokeless tobacco: Never   Substance and Sexual Activity    Alcohol use: Not Currently    Drug use: Not on file    Sexual activity: Not on file   Other Topics Concern    Not on file   Social History Narrative    Not on file      Social Determinants of Health     Financial Resource Strain: Low Risk  (3/27/2024)    Overall Financial Resource Strain (CARDIA)     Difficulty of Paying Living Expenses: Not hard at all   Food Insecurity: No Food Insecurity (12/4/2023)    Received from [facility]    Hunger Vital Sign     Worried About Running Out of Food in the Last Year: Never true     Ran Out of Food in the Last Year: Never true   Transportation Needs: No Transportation Needs (3/27/2024)    PRAPARE - Transportation     Lack of Transportation (Medical): No     Lack of Transportation (Non-Medical): No   Physical Activity: Not on File (2/26/2021)    Received from [employer]    Physical Activity     Physical Activity: 0   Stress: Not on File (2/26/2021)    Received from [employer]    Stress     Stress: 0   Social Connections: Not on File (2/26/2021)    Received from [employer]    Social Connections     Social Connections and Isolation: 0   Intimate Partner Violence: Not At Risk (12/4/2023)    Received from [facility] [facility] [employer]    Humiliation, Afraid, Rape, and Kick questionnaire     Fear of Current or Ex-Partner: No     Emotionally Abused: No     Physically Abused: No     Sexually Abused: No   Housing Stability: Low Risk  (3/27/2024)    Housing Stability Vital Sign     Unable to Pay for Housing in the Last Year: No     Number of Places Lived in the Last Year: 1     Unstable Housing in the Last Year: No     Past Medical History:   Diagnosis Date    Acute cystitis with hematuria 12/31/2016    Acute cystitis with hematuria    Personal history of other diseases of the circulatory system     History of hypertension    Personal history of other diseases of the circulatory system     History of cardiac disorder    Personal history of other endocrine, nutritional and metabolic disease     History of diabetic neuropathy    Personal history of other endocrine, nutritional and metabolic disease     History of hyperlipidemia    Personal history of other endocrine, nutritional and  metabolic disease     History of type 2 diabetes mellitus    Personal history of other specified conditions     History of insomnia     Past Surgical History:   Procedure Laterality Date    OTHER SURGICAL HISTORY  09/17/2019    Bypass    OTHER SURGICAL HISTORY  09/17/2019    Arterial stent placement       Charting was completed using voice recognition technology and may include unintended errors.

## 2024-07-05 ENCOUNTER — TELEPHONE (OUTPATIENT)
Dept: PRIMARY CARE | Facility: CLINIC | Age: 74
End: 2024-07-05
Payer: MEDICARE

## 2024-07-05 NOTE — TELEPHONE ENCOUNTER
----- Message from Nydia De La Paz MD sent at 7/5/2024  2:26 PM EDT -----  XR shows OA. Can finish course of steroids. No gouty arthropathy seen.

## 2024-07-08 ENCOUNTER — PATIENT OUTREACH (OUTPATIENT)
Dept: PRIMARY CARE | Facility: CLINIC | Age: 74
End: 2024-07-08
Payer: MEDICARE

## 2024-07-09 ENCOUNTER — APPOINTMENT (OUTPATIENT)
Dept: PRIMARY CARE | Facility: CLINIC | Age: 74
End: 2024-07-09
Payer: MEDICARE

## 2024-07-09 ENCOUNTER — TELEPHONE (OUTPATIENT)
Dept: PRIMARY CARE | Facility: CLINIC | Age: 74
End: 2024-07-09

## 2024-07-09 DIAGNOSIS — F51.01 PRIMARY INSOMNIA: Primary | ICD-10-CM

## 2024-07-09 RX ORDER — ZOLPIDEM TARTRATE 10 MG/1
10 TABLET ORAL NIGHTLY
Qty: 30 TABLET | Refills: 0 | Status: SHIPPED | OUTPATIENT
Start: 2024-07-09 | End: 2024-08-08

## 2024-07-09 NOTE — TELEPHONE ENCOUNTER
Patient had left a voicemail requesting a refill for zolpidem. He was just in the office on 7/3/24 and has an upcoming appointment 9/10/24. Please advise if this can be refilled or if he would need to be seen in person for refill.

## 2024-07-11 DIAGNOSIS — I10 HYPERTENSION, UNSPECIFIED TYPE: ICD-10-CM

## 2024-07-11 DIAGNOSIS — I25.10 CORONARY ARTERY DISEASE, UNSPECIFIED VESSEL OR LESION TYPE, UNSPECIFIED WHETHER ANGINA PRESENT, UNSPECIFIED WHETHER NATIVE OR TRANSPLANTED HEART: Primary | ICD-10-CM

## 2024-07-11 RX ORDER — CLOPIDOGREL BISULFATE 75 MG/1
75 TABLET ORAL DAILY
Qty: 30 TABLET | Refills: 3 | Status: SHIPPED | OUTPATIENT
Start: 2024-07-11

## 2024-07-11 RX ORDER — AMLODIPINE BESYLATE 10 MG/1
10 TABLET ORAL DAILY
Qty: 30 TABLET | Refills: 3 | Status: SHIPPED | OUTPATIENT
Start: 2024-07-11

## 2024-07-11 RX ORDER — LOSARTAN POTASSIUM 25 MG/1
25 TABLET ORAL DAILY
Qty: 60 TABLET | Refills: 3 | Status: SHIPPED | OUTPATIENT
Start: 2024-07-11

## 2024-08-05 ENCOUNTER — TELEPHONE (OUTPATIENT)
Dept: PRIMARY CARE | Facility: CLINIC | Age: 74
End: 2024-08-05

## 2024-08-05 ENCOUNTER — TELEPHONE (OUTPATIENT)
Dept: PRIMARY CARE | Facility: CLINIC | Age: 74
End: 2024-08-05
Payer: MEDICARE

## 2024-08-05 NOTE — TELEPHONE ENCOUNTER
I spoke to pt - informed him he will need to be evaluated in person before any medication is given to him. He expressed understanding and stated he thinks it may be a diabetic issue. Appointment scheduled for tomorrow.

## 2024-08-05 NOTE — TELEPHONE ENCOUNTER
WAS SEEN A MONTH AGO FOR FOOT PAIN WHICH SHOWED ARTHRITIS. STATES HE WOULD LIKE PAIN MEDS ORDERED. HE SPOKE TO SOMEONE OVER WEEKEND (?) WHO SAID TO MAKE APPT. DECLINED APPT AT THIS TIME AND WANTED MESSAGE TAKEN.

## 2024-08-06 ENCOUNTER — OFFICE VISIT (OUTPATIENT)
Dept: PRIMARY CARE | Facility: CLINIC | Age: 74
End: 2024-08-06
Payer: MEDICARE

## 2024-08-06 ENCOUNTER — APPOINTMENT (OUTPATIENT)
Dept: PRIMARY CARE | Facility: CLINIC | Age: 74
End: 2024-08-06
Payer: MEDICARE

## 2024-08-06 ENCOUNTER — HOSPITAL ENCOUNTER (OUTPATIENT)
Dept: RADIOLOGY | Facility: EXTERNAL LOCATION | Age: 74
Discharge: HOME | End: 2024-08-06

## 2024-08-06 VITALS
OXYGEN SATURATION: 98 % | HEART RATE: 94 BPM | HEIGHT: 66 IN | DIASTOLIC BLOOD PRESSURE: 92 MMHG | WEIGHT: 158 LBS | SYSTOLIC BLOOD PRESSURE: 172 MMHG | BODY MASS INDEX: 25.39 KG/M2

## 2024-08-06 DIAGNOSIS — I70.1 RENAL ARTERY STENOSIS (CMS-HCC): ICD-10-CM

## 2024-08-06 DIAGNOSIS — M79.671 RIGHT FOOT PAIN: ICD-10-CM

## 2024-08-06 DIAGNOSIS — N18.30 STAGE 3 CHRONIC KIDNEY DISEASE, UNSPECIFIED WHETHER STAGE 3A OR 3B CKD (MULTI): Primary | ICD-10-CM

## 2024-08-06 DIAGNOSIS — F51.01 PRIMARY INSOMNIA: ICD-10-CM

## 2024-08-06 PROCEDURE — 4010F ACE/ARB THERAPY RXD/TAKEN: CPT | Performed by: STUDENT IN AN ORGANIZED HEALTH CARE EDUCATION/TRAINING PROGRAM

## 2024-08-06 PROCEDURE — 1160F RVW MEDS BY RX/DR IN RCRD: CPT | Performed by: STUDENT IN AN ORGANIZED HEALTH CARE EDUCATION/TRAINING PROGRAM

## 2024-08-06 PROCEDURE — 1036F TOBACCO NON-USER: CPT | Performed by: STUDENT IN AN ORGANIZED HEALTH CARE EDUCATION/TRAINING PROGRAM

## 2024-08-06 PROCEDURE — 99214 OFFICE O/P EST MOD 30 MIN: CPT | Performed by: STUDENT IN AN ORGANIZED HEALTH CARE EDUCATION/TRAINING PROGRAM

## 2024-08-06 PROCEDURE — 1159F MED LIST DOCD IN RCRD: CPT | Performed by: STUDENT IN AN ORGANIZED HEALTH CARE EDUCATION/TRAINING PROGRAM

## 2024-08-06 PROCEDURE — 3077F SYST BP >= 140 MM HG: CPT | Performed by: STUDENT IN AN ORGANIZED HEALTH CARE EDUCATION/TRAINING PROGRAM

## 2024-08-06 PROCEDURE — 3080F DIAST BP >= 90 MM HG: CPT | Performed by: STUDENT IN AN ORGANIZED HEALTH CARE EDUCATION/TRAINING PROGRAM

## 2024-08-06 RX ORDER — METHYLPREDNISOLONE 4 MG/1
TABLET ORAL
Qty: 21 TABLET | Refills: 0 | Status: SHIPPED | OUTPATIENT
Start: 2024-08-06 | End: 2024-08-13

## 2024-08-06 RX ORDER — DICLOFENAC SODIUM 10 MG/G
4 GEL TOPICAL 4 TIMES DAILY
Qty: 100 G | Refills: 1 | Status: SHIPPED | OUTPATIENT
Start: 2024-08-06

## 2024-08-06 ASSESSMENT — ENCOUNTER SYMPTOMS: PAIN: 1

## 2024-08-06 NOTE — PROGRESS NOTES
Subjective   Patient ID: Fredrick De La Paz is a 73 y.o. male who presents for follow up after his recent hospital admission with CHF exacerbation    AMW: 3/12/24; see chart     Assessment/Plan     Acute Medical Issues    R Foot Pain   -Has visible swelling over the anterior foot   -Advised to avoid NSAIDs  PLAN  -XR R foot  -Medrol dose pack   -Voltaren topical for pain relief rx       Chronic Medical Issues     Preventative Medicine  -UTD on vaccines   -Had colonoscopy done recently per patient. Results to be brought  -PSA checked by previous PCP. States that it was normal but has a hx of BPH.   -PHQ2: 0  -Alcohol screening done    CHF/HFrEF (EF 25%)  CAD  -Echo on 3/27/24 shows EF 25%  -Was prescribed GDMT but was unable to afford Entresto.  Continues to take Jardiance 10 mg p.o. daily  -Has frequent exacerbations  -Had cardiac cath  in March 2024.  No intervention done at that    PLAN  -Currently on losartan 25 mg daily as he cannot afford Entresto.  -Was taken off torsemide by someone and is currently on lasix. Continue jardiance, metoprolol, spironolactone   -Refer to EP for AICD. Given number to EP at [facility] cardiology  during last appointment,Still has not made an appointment.  -Referral given to Dr. Cobb for general cardiology follow up. Was seen at  HF clinic recently but is declining to go back to [facility].   -Declining cardiac rehab     Pulmonary Nodule  -Seen in CT chest from March 2024  -Recommend 1 year repeat CT     VALERIE  -Hx of previous remote stenting and balloon angioplasty  -Continue to monitor renal function  -Follows with Dr Casillas (vascular surgery)     T2DM  -Regarding his DM, he is on Basalglar 10 units in the morning, Humalog 15 units with each meal (as his insurance covers humalog but not novolog). Last A1c was 8.0 while at [facility]. He is compliant with home medications at this time. States that he eats a high carb diet. Advised patient to decrease carb intake  -Continue humalog to 17U  BIDWM and basaglar to 12U qAM.   -States that sugars are averaged in the 150-160s per his CGM.     CKD-III  -Continue ARB  -Continue Jardiance  -Avoid nephrotoxic medications       Insomnia   -Continue ambien  -Last filled on 6/6 by Dr JAMIL Whitmore    HTN  -BP is high in office, but patient has not taken his morning meds.   -Advised medication compliance and diet control  -Continue ARB, norvasc and metoprolol   -Low salt diet     OARRS Report Reviewed and appropriate.  UDS reviewed.  I have personally reviewed the OARRS report.  I have considered the risks of abuse, dependence, addiction and diversion. I believe that it is clinically appropriate for this patient to be prescribed this medication based on documented diagnosis.     Controlled Substance Agreement:   I have printed this form and reviewed each line item with the patient and the patient has verbalized understanding.   Date of the last Controlled Substance Agreement: 2/27/24  Date of UDS To be obtained    I discussed patient's OARRS report as well as the potential concern for overdose on prescribed opioid, sleep aid, gabapentin/Lyrica, and/or benzodiazepines. I explained that Overdose Risk Scores >460, require a Narcan prescription and places the patient at risk for potential death.      Patient understands that the risk of death can occur when using opioid, benzodiazepines, gabapentin, Lyrica, sleep aids, and illegal drugs. The risk of death especially increases when using the above medications and or illegal drugs in combination. I have reviewed with the patient and the patient is in agreement with the terms of the  Controlled Substance Agreement.      At this point in time, patient is in compliance with the above, and has no signs of dependence or addiction to the above prescribed medications.          Toe Pain     Pain    73M presents for acute sick visit. He was admitted to [facility] from 3/27 to 3/30/2024.  Patient went to [facility] for evaluation of  "shortness of breath.  Was found to be in acute CHF exacerbation and was treated with IV diuresis.  Stay was complicated by cardiorenal syndrome and nephrology was consulted.  Patient also underwent echocardiogram during that hospitalization and was found an EF of 25%.  He also underwent diagnostic cath but no intervention was done.  Once his shortness of breath improved after diuresis patient was discharged home.      1 week of acute R foot pain.   Has a hx of gout   Pain is moderate to severe and worse with walking.  Has associated R foot swelling as well.   Denies trauma or falls.   Denies fevers, chills.     Denies fevers, chills, weight loss, lightheadedness, dizziness, vision changes, sore throat, runny nose, CP, SOB, cough, palpitations, n/v/d, abd pain, black/bloody stools, arthralgias, mood disturbance, or new numbness/weakness/tingling in arms/legs/face.        All complaints and concerns addressed.      PMH: CKD, HFrEF, CAD, Insomnia, HTN, T2DM, VALERIE  Surgeries: PCI, renal artery stenting, PAD with stenting.     Social hx: Non smoker, denies Etoh drinking, no drugs, he med school lecturer, and is still working online part time      Denies fevers, chills, weight loss, lightheadedness, dizziness, vision changes, sore throat, runny nose, CP, cough, palpitations, n/v/d, abd pain, black/bloody stools, arthralgias, or new numbness/weakness/tingling in arms/legs/face.        Visit Vitals  BP (!) 172/92   Pulse 94   Ht 1.676 m (5' 6\")   Wt 71.7 kg (158 lb)   SpO2 98%   BMI 25.50 kg/m²   Smoking Status Never   BSA 1.83 m²     PHYSICAL EXAM     Physical Exam     Visit Vitals  BP (!) 172/92   Pulse 94   Ht 1.676 m (5' 6\")   Wt 71.7 kg (158 lb)   SpO2 98%   BMI 25.50 kg/m²   Smoking Status Never   BSA 1.83 m²        General: NAD. NCAT. Aox3   HEENT: PERRLA. EOMI. MMM. Nares patent bl.  Cardiovascular: RRR. S1/S2 wnl. No JVD.   Respiratory: CTABL. No acute respiratory distress. No crackles  GI: Soft, NT abdomen.   MSK: " ROM x 4. Weakness is improved.   Extremities: No BLLE edema. baseline. L great toe is erythematous and swollen. TTP.   Skin: No rashes or bruises.   Neuro: Aox3. Cranial Nerves grossly intact. Motor/sensory wnl.   Psych: Mood wnl.       REVIEW OF SYSTEMS     ROS in HPI   No Known Allergies    Current Outpatient Medications   Medication Sig Dispense Refill    allopurinol (Zyloprim) 100 mg tablet Take 1 tablet (100 mg) by mouth once daily. (Patient taking differently: Take 1 tablet (100 mg) by mouth 2 times a day.) 90 tablet 3    amLODIPine (Norvasc) 10 mg tablet Take 1 tablet (10 mg) by mouth once daily. 30 tablet 3    aspirin 81 mg EC tablet Take 1 tablet (81 mg) by mouth once daily.      atorvastatin (Lipitor) 40 mg tablet Take 1 tablet (40 mg) by mouth once daily. 90 tablet 3    Basaglar KwikPen U-100 Insulin 100 unit/mL (3 mL) pen Inject 12 Units under the skin once daily in the morning.      clopidogrel (Plavix) 75 mg tablet Take 1 tablet (75 mg) by mouth once daily. 30 tablet 3    furosemide (Lasix) 20 mg tablet Take 1 tablet (20 mg) by mouth once daily.      insulin lispro (HumaLOG U-100 Insulin) 100 unit/mL injection Inject 0.17 mL (17 Units) under the skin 2 times daily (morning and late afternoon). Take as directed per insulin instructions. 10.2 mL 11    isosorbide mononitrate ER (Imdur) 60 mg 24 hr tablet Take 1 tablet (60 mg) by mouth once daily.      Jardiance 10 mg Take 1 tablet (10 mg) by mouth once daily.      losartan (Cozaar) 25 mg tablet Take 1 tablet (25 mg) by mouth once daily. 60 tablet 3    metoprolol succinate XL (Toprol-XL) 100 mg 24 hr tablet Take 1 tablet (100 mg) by mouth once daily.      multivitamin tablet Take 1 tablet by mouth once daily.      pantoprazole (ProtoNix) 40 mg EC tablet Take 1 tablet (40 mg) by mouth once daily in the morning. Take before meals.      sacubitriL-valsartan (Entresto) 24-26 mg tablet Take 1 tablet by mouth 2 times a day. 60 tablet 5    spironolactone  (Aldactone) 25 mg tablet Take 1 tablet (25 mg) by mouth once daily.      zolpidem (Ambien) 10 mg tablet Take 1 tablet (10 mg) by mouth once daily at bedtime. 30 tablet 0    torsemide (Demadex) 20 mg tablet Take 1 tablet (20 mg) by mouth once daily. (Patient not taking: Reported on 6/13/2024)      traZODone (Desyrel) 50 mg tablet Take 1 tablet (50 mg) by mouth as needed at bedtime for sleep for up to 7 days. 7 tablet 0     No current facility-administered medications for this visit.       Objective     Lab on 07/03/2024   Component Date Value Ref Range Status    Uric Acid 07/03/2024 5.0  4.0 - 7.5 mg/dL Final       Radiology: Reviewed imaging in powerchart.  No results found.    No family history on file.  Social History     Socioeconomic History    Marital status:    Tobacco Use    Smoking status: Never    Smokeless tobacco: Never   Substance and Sexual Activity    Alcohol use: Not Currently     Social Determinants of Health     Financial Resource Strain: Low Risk  (3/27/2024)    Overall Financial Resource Strain (CARDIA)     Difficulty of Paying Living Expenses: Not hard at all   Food Insecurity: No Food Insecurity (12/4/2023)    Received from [employer]    Hunger Vital Sign     Worried About Running Out of Food in the Last Year: Never true     Ran Out of Food in the Last Year: Never true   Transportation Needs: No Transportation Needs (3/27/2024)    PRAPARE - Transportation     Lack of Transportation (Medical): No     Lack of Transportation (Non-Medical): No   Physical Activity: Not on File (2/26/2021)    Received from [employer]    Physical Activity     Physical Activity: 0   Stress: Not on File (2/26/2021)    Received from [employer]    Stress     Stress: 0   Social Connections: Not on File (2/26/2021)    Received from [employer]    Social Connections     Social Connections and Isolation: 0   Intimate Partner Violence: Not At Risk (12/4/2023)    Received from [employer]     Humiliation, Afraid, Rape, and Kick questionnaire     Fear of Current or Ex-Partner: No     Emotionally Abused: No     Physically Abused: No     Sexually Abused: No   Housing Stability: Low Risk  (3/27/2024)    Housing Stability Vital Sign     Unable to Pay for Housing in the Last Year: No     Number of Places Lived in the Last Year: 1     Unstable Housing in the Last Year: No     Past Medical History:   Diagnosis Date    Acute cystitis with hematuria 12/31/2016    Acute cystitis with hematuria    Personal history of other diseases of the circulatory system     History of hypertension    Personal history of other diseases of the circulatory system     History of cardiac disorder    Personal history of other endocrine, nutritional and metabolic disease     History of diabetic neuropathy    Personal history of other endocrine, nutritional and metabolic disease     History of hyperlipidemia    Personal history of other endocrine, nutritional and metabolic disease     History of type 2 diabetes mellitus    Personal history of other specified conditions     History of insomnia     Past Surgical History:   Procedure Laterality Date    OTHER SURGICAL HISTORY  09/17/2019    Bypass    OTHER SURGICAL HISTORY  09/17/2019    Arterial stent placement       Charting was completed using voice recognition technology and may include unintended errors.

## 2024-08-07 NOTE — TELEPHONE ENCOUNTER
----- Message from Nydia De La Paz sent at 8/6/2024  4:44 PM EDT -----  Likely plantar fascitis. Complete course of steroids and follow up if needed.   Recommend plantar stretching.   Can refer to Podiatry if symptoms dont improve for plantar injection.

## 2024-09-04 ENCOUNTER — PATIENT OUTREACH (OUTPATIENT)
Dept: PRIMARY CARE | Facility: CLINIC | Age: 74
End: 2024-09-04
Payer: MEDICARE

## 2024-09-04 RX ORDER — TORSEMIDE 20 MG/1
20 TABLET ORAL
COMMUNITY
Start: 2024-08-30

## 2024-09-04 NOTE — PROGRESS NOTES
Discharge Facility:  [facility]   Discharge Diagnosis:  Acute hypoxic respiratory failure Chronic diastolic heart failure, USMAN   Admission Date:  8/25/24   Discharge Date:  8/30/24     PCP Appointment Date:  9/10/24   Specialist Appointment Date:   cardio 9/16/24  9/3/24 Cardiac Rehab   Hospital Encounter and Summary Linked: Yes- limited info   See discharge assessment below for further details    : Please have BMP drawn prior to follow-up appointment     Medications  Medications reviewed with patient/caregiver?: Yes (9/4/2024  9:26 AM)  Is the patient having any side effects they believe may be caused by any medication additions or changes?: No (9/4/2024  9:26 AM)  Does the patient have all medications ordered at discharge?: No (9/4/2024  9:26 AM)  What is keeping the patient from filling the prescriptions?: Medication reconciliation issue (9/4/2024  9:26 AM)  Prescription Comments: New scripts given for  Plavix  torsemide (9/4/2024  9:26 AM)  Is the patient taking all medications as directed (includes completed medication regime)?: No (PHarm needs to speak with MD) (9/4/2024  9:26 AM)  Care Management Interventions: Provided patient education (9/4/2024  9:26 AM)  Medication Comments: pt states pharm has questions about torsemide script.- cm called and verified it will be ready to  today (9/4/2024  9:26 AM)    Appointments  Does the patient have a primary care provider?: Yes (9/4/2024  9:26 AM)  Care Management Interventions: Verified appointment date/time/provider (9/4/2024  9:26 AM)  Has the patient kept scheduled appointments due by today?: Yes (9/4/2024  9:26 AM)  Care Management Interventions: Advised patient to keep appointment (9/4/2024  9:26 AM)    Self Management  What is the home health agency?: denies need (9/4/2024  9:26 AM)  Has home health visited the patient within 72 hours of discharge?: Not applicable (9/4/2024  9:26 AM)  What Durable Medical Equipment (DME) was  ordered?: walker (9/4/2024  9:26 AM)    Patient Teaching  Does the patient have access to their discharge instructions?: Yes (9/4/2024  9:26 AM)  Care Management Interventions: Reviewed instructions with patient (9/4/2024  9:26 AM)  What is the patient's perception of their health status since discharge?: Improving (9/4/2024  9:26 AM)  Is the patient/caregiver able to teach back the hierarchy of who to call/visit for symptoms/problems? PCP, Specialist, Home Health nurse, Urgent Care, ED, 911: Yes (9/4/2024  9:26 AM)  Patient/Caregiver Education Comments: Successful transition of care outreach with patient. Pt reports doing well at home since discharge. New meds reviewed. Pt denies CP and SOB. cm spoke with RX and they assured me  script for Torsemide will be ready to  today, they were waiting on a 90 day supply script.  Patient denies any further discharge questions/needs at this time. Emphasized that Follow up is needed after discharge to review the hospital recommendations, assess your response to your treatment. Pt aware of my availability for non-emergent concerns. Contact info provided to patient (9/4/2024  9:26 AM)

## 2024-09-09 ENCOUNTER — TELEPHONE (OUTPATIENT)
Dept: PRIMARY CARE | Facility: CLINIC | Age: 74
End: 2024-09-09
Payer: MEDICARE

## 2024-09-10 ENCOUNTER — APPOINTMENT (OUTPATIENT)
Dept: PRIMARY CARE | Facility: CLINIC | Age: 74
End: 2024-09-10
Payer: MEDICARE

## 2024-09-11 ENCOUNTER — PATIENT OUTREACH (OUTPATIENT)
Dept: PRIMARY CARE | Facility: CLINIC | Age: 74
End: 2024-09-11
Payer: MEDICARE

## 2024-09-11 NOTE — PROGRESS NOTES
Discharge Facility:  [facility] (Hospital)  Discharge Diagnosis:  Chest pain,  Heart failure,   Admission Date:  9/6/24   Discharge Date:  9/9/24     PCP Appointment Date:  9/13/24   Specialist Appointment Date:   Hospital Encounter and Summary Linked: Yes    Unable to reach patient x2 attempts. Voicemail left with call back number.

## 2024-09-13 ENCOUNTER — OFFICE VISIT (OUTPATIENT)
Dept: PRIMARY CARE | Facility: CLINIC | Age: 74
End: 2024-09-13
Payer: MEDICARE

## 2024-09-13 VITALS
BODY MASS INDEX: 25.39 KG/M2 | WEIGHT: 158 LBS | HEART RATE: 76 BPM | DIASTOLIC BLOOD PRESSURE: 66 MMHG | SYSTOLIC BLOOD PRESSURE: 126 MMHG | OXYGEN SATURATION: 98 % | HEIGHT: 66 IN

## 2024-09-13 DIAGNOSIS — E11.22 TYPE 2 DIABETES MELLITUS WITH CHRONIC KIDNEY DISEASE, WITH LONG-TERM CURRENT USE OF INSULIN, UNSPECIFIED CKD STAGE (MULTI): ICD-10-CM

## 2024-09-13 DIAGNOSIS — I50.42 CHRONIC COMBINED SYSTOLIC AND DIASTOLIC CONGESTIVE HEART FAILURE (MULTI): ICD-10-CM

## 2024-09-13 DIAGNOSIS — N18.30 STAGE 3 CHRONIC KIDNEY DISEASE, UNSPECIFIED WHETHER STAGE 3A OR 3B CKD (MULTI): ICD-10-CM

## 2024-09-13 DIAGNOSIS — Z79.4 TYPE 2 DIABETES MELLITUS WITH CHRONIC KIDNEY DISEASE, WITH LONG-TERM CURRENT USE OF INSULIN, UNSPECIFIED CKD STAGE (MULTI): ICD-10-CM

## 2024-09-13 DIAGNOSIS — Z76.89 ENCOUNTER FOR SUPPORT AND COORDINATION OF TRANSITION OF CARE: Primary | ICD-10-CM

## 2024-09-13 LAB
NON-UH HIE A/G RATIO: 1.2
NON-UH HIE ALB: 3.7 G/DL (ref 3.4–5)
NON-UH HIE ALK PHOS: 79 UNIT/L (ref 45–117)
NON-UH HIE BILIRUBIN, TOTAL: 0.5 MG/DL (ref 0.3–1.2)
NON-UH HIE BUN/CREAT RATIO: 15.5
NON-UH HIE BUN: 48 MG/DL (ref 9–23)
NON-UH HIE CALCIUM: 9.2 MG/DL (ref 8.7–10.4)
NON-UH HIE CALCULATED OSMOLALITY: 294 MOSM/KG (ref 275–295)
NON-UH HIE CHLORIDE: 100 MMOL/L (ref 98–107)
NON-UH HIE CO2, VENOUS: 28 MMOL/L (ref 20–31)
NON-UH HIE CREATININE: 3.1 MG/DL (ref 0.6–1.1)
NON-UH HIE GFR AA: 24
NON-UH HIE GLOBULIN: 3.2 G/DL
NON-UH HIE GLOMERULAR FILTRATION RATE: 20 ML/MIN/1.73M?
NON-UH HIE GLUCOSE: 260 MG/DL (ref 74–106)
NON-UH HIE GOT: 45 UNIT/L (ref 15–37)
NON-UH HIE GPT: 35 UNIT/L (ref 10–49)
NON-UH HIE K: 4.4 MMOL/L (ref 3.5–5.1)
NON-UH HIE NA: 136 MMOL/L (ref 135–145)
NON-UH HIE TOTAL PROTEIN: 6.9 G/DL (ref 5.7–8.2)

## 2024-09-13 RX ORDER — DAPAGLIFLOZIN 5 MG/1
10 TABLET, FILM COATED ORAL DAILY
COMMUNITY
Start: 2024-08-25

## 2024-09-13 RX ORDER — HYDROXYZINE HYDROCHLORIDE 50 MG/1
50 TABLET, FILM COATED ORAL AS NEEDED
COMMUNITY
Start: 2023-12-20 | End: 2024-09-13 | Stop reason: WASHOUT

## 2024-09-13 RX ORDER — INSULIN LISPRO 100 [IU]/ML
15 INJECTION, SOLUTION INTRAVENOUS; SUBCUTANEOUS
Start: 2024-09-13 | End: 2025-09-13

## 2024-09-13 RX ORDER — CLONIDINE HYDROCHLORIDE 0.1 MG/1
1 TABLET ORAL
COMMUNITY
Start: 2024-07-23 | End: 2024-09-13 | Stop reason: WASHOUT

## 2024-09-13 ASSESSMENT — ENCOUNTER SYMPTOMS: PAIN: 1

## 2024-09-13 NOTE — PROGRESS NOTES
Subjective   Patient ID: Fredrick De La Paz is a 73 y.o. male who presents for follow up after his recent hospital admission with CHF exacerbation    AMW: 3/12/24; see chart     Assessment/Plan       Transition of Care Management  -Recently admitted to [facility] x 2 for acute CHF exacerbation  -Has medications were adjusted and he was discharged on losartan, spironolactone, torsemide, beta blocker and farxiga.   -Reviewed all home medications with patient.   -Currently doing well  -Would like to check CMP to ensure potassium is okay.         R Foot Pain - resolved     Preventative Medicine  -UTD on vaccines   -Had colonoscopy done recently per patient. Results to be brought  -PSA checked by previous PCP. States that it was normal but has a hx of BPH.   -PHQ2: 0  -Alcohol screening done    CHF/HFrEF (EF 25%)  CAD  -Echo on 3/27/24 shows EF 25%  -Was prescribed GDMT but was unable to afford Entresto.  Continues to take [id number] mg p.o. daily  -Has frequent exacerbations  -Had cardiac cath  in March 2024.  No intervention done at that    PLAN  -Currently on losartan 25 mg daily as he cannot afford Entresto.  -Continue Torsemide 40mg PO daily   -Continue Aldactone 25mg PO daily  -Continue metop succinate 100mg PO daily   -Continue farxiga 10mg PO daily  -Currently euvolemic   -Refer to EP for AICD. Given number to EP at [facility]  during last appointment,Still has not made an appointment.  - Low salt diet    Pulmonary Nodule  -Seen in CT chest from March 2024  -Recommend 1 year repeat CT     VALERIE  -Hx of previous remote stenting and balloon angioplasty  -Continue to monitor renal function  -Follows with Dr Casillas (vascular surgery)     T2DM  -Regarding his DM, he is on Basalglar 12 units in the morning, Humalog 15 units with each meal (as his insurance covers humalog but not novolog). Last A1c was 7.6 last month. He is compliant with home medications at this time. States that he eats a high carb diet. Advised patient to  decrease carb intake  -States that sugars are averaged in the 150-160s per his CGM.     CKD-III  -Continue ARB  -Continue Farxiga  -Avoid nephrotoxic medications   Insomnia   -Continue ambien  -Gets refills from Dr JAMIL Whitmore.     HTN  -Advised medication compliance and diet control  -Continue ARB, norvasc and metoprolol   -Low salt diet     OARRS Report Reviewed and appropriate.  UDS reviewed.  I have personally reviewed the OARRS report.  I have considered the risks of abuse, dependence, addiction and diversion. I believe that it is clinically appropriate for this patient to be prescribed this medication based on documented diagnosis.     Controlled Substance Agreement:   I have printed this form and reviewed each line item with the patient and the patient has verbalized understanding.   Date of the last Controlled Substance Agreement: 2/27/24  Date of UDS To be obtained    I discussed patient's OARRS report as well as the potential concern for overdose on prescribed opioid, sleep aid, gabapentin/Lyrica, and/or benzodiazepines. I explained that Overdose Risk Scores >460, require a Narcan prescription and places the patient at risk for potential death.      Patient understands that the risk of death can occur when using opioid, benzodiazepines, gabapentin, Lyrica, sleep aids, and illegal drugs. The risk of death especially increases when using the above medications and or illegal drugs in combination. I have reviewed with the patient and the patient is in agreement with the terms of the  Controlled Substance Agreement.      At this point in time, patient is in compliance with the above, and has no signs of dependence or addiction to the above prescribed medications.          Toe Pain     Pain    73M presents for transition of care appointment. Recently admitted 2x to [facility] for ADHF. Was treated with IV diuresis and had medications adjusted to be on GDMT. Currently doing well. No SOB or leg swelling reported. Reviewed  "all home meds  with him and discarded lasix, hydroxyzine and clonidine.     Discussed medication compliance importance.     Denies fevers, chills, weight loss, lightheadedness, dizziness, vision changes, sore throat, runny nose, CP, SOB, cough, palpitations, n/v/d, abd pain, black/bloody stools, arthralgias, mood disturbance, or new numbness/weakness/tingling in arms/legs/face.        All complaints and concerns addressed.    PMH: CKD, HFrEF, CAD, Insomnia, HTN, T2DM, VALERIE  Surgeries: PCI, renal artery stenting, PAD with stenting.     Social hx: Non smoker, denies Etoh drinking, no drugs, he med school lecturer, and is still working online part time    Visit Vitals  /66   Pulse 76   Ht 1.676 m (5' 6\")   Wt 71.7 kg (158 lb)   SpO2 98%   BMI 25.50 kg/m²   Smoking Status Never   BSA 1.83 m²     PHYSICAL EXAM     Physical Exam     Visit Vitals  /66   Pulse 76   Ht 1.676 m (5' 6\")   Wt 71.7 kg (158 lb)   SpO2 98%   BMI 25.50 kg/m²   Smoking Status Never   BSA 1.83 m²        General: NAD. NCAT. Aox3   HEENT: PERRLA. EOMI. MMM. Nares patent bl.  Cardiovascular: RRR. S1/S2 wnl. No JVD.   Respiratory: CTABL. No acute respiratory distress. No crackles  GI: Soft, NT abdomen.   MSK: ROM x 4. Weakness is improved.   Extremities: No BLLE edema.   Skin: No rashes or bruises.   Neuro: Aox3. Cranial Nerves grossly intact. Motor/sensory wnl.   Psych: Mood wnl.       REVIEW OF SYSTEMS     ROS in HPI   No Known Allergies    Current Outpatient Medications   Medication Sig Dispense Refill    allopurinol (Zyloprim) 100 mg tablet Take 1 tablet (100 mg) by mouth once daily. (Patient taking differently: Take 1 tablet (100 mg) by mouth 2 times a day.) 90 tablet 3    amLODIPine (Norvasc) 10 mg tablet Take 1 tablet (10 mg) by mouth once daily. 30 tablet 3    aspirin 81 mg EC tablet Take 1 tablet (81 mg) by mouth once daily.      atorvastatin (Lipitor) 40 mg tablet Take 1 tablet (40 mg) by mouth once daily. 90 tablet 3    Basaglar " KwikPen U-100 Insulin 100 unit/mL (3 mL) pen Inject 12 Units under the skin once daily in the morning.      cloNIDine (Catapres) 0.1 mg tablet Take 1 tablet (0.1 mg) by mouth every 12 hours.      clopidogrel (Plavix) 75 mg tablet Take 1 tablet (75 mg) by mouth once daily. 30 tablet 3    dapagliflozin propanediol (Farxiga) 5 mg Take 1 tablet (5 mg) by mouth once daily.      diclofenac sodium (Voltaren) 1 % gel Apply 4.5 inches (4 g) topically 4 times a day. 100 g 1    furosemide (Lasix) 20 mg tablet Take 1 tablet (20 mg) by mouth once daily.      hydrOXYzine HCL (Atarax) 50 mg tablet Take 1 tablet (50 mg) by mouth if needed for anxiety.      insulin lispro (HumaLOG U-100 Insulin) 100 unit/mL injection Inject 0.17 mL (17 Units) under the skin 2 times daily (morning and late afternoon). Take as directed per insulin instructions. 10.2 mL 11    isosorbide mononitrate ER (Imdur) 60 mg 24 hr tablet Take 1 tablet (60 mg) by mouth once daily.      Jardiance 10 mg Take 1 tablet (10 mg) by mouth once daily.      losartan (Cozaar) 25 mg tablet Take 1 tablet (25 mg) by mouth once daily. 60 tablet 3    metoprolol succinate XL (Toprol-XL) 100 mg 24 hr tablet Take 1 tablet (100 mg) by mouth once daily.      multivitamin tablet Take 1 tablet by mouth once daily.      pantoprazole (ProtoNix) 40 mg EC tablet Take 1 tablet (40 mg) by mouth once daily in the morning. Take before meals.      sacubitriL-valsartan (Entresto) 24-26 mg tablet Take 1 tablet by mouth 2 times a day. 60 tablet 5    spironolactone (Aldactone) 25 mg tablet Take 1 tablet (25 mg) by mouth once daily.      torsemide (Demadex) 20 mg tablet 1 tablet (20 mg).      suvorexant (Belsomra) 10 mg tablet Take 1 tablet (10 mg) by mouth as needed at bedtime for sleep for up to 10 days. 10 tablet 0    traZODone (Desyrel) 50 mg tablet Take 1 tablet (50 mg) by mouth as needed at bedtime for sleep for up to 7 days. 7 tablet 0    zolpidem (Ambien) 10 mg tablet Take 1 tablet (10 mg)  by mouth once daily at bedtime. 30 tablet 0     No current facility-administered medications for this visit.       Objective     Lab on 07/03/2024   Component Date Value Ref Range Status    Uric Acid 07/03/2024 5.0  4.0 - 7.5 mg/dL Final       Radiology: Reviewed imaging in powerchart.  No results found.    No family history on file.  Social History     Socioeconomic History    Marital status:    Tobacco Use    Smoking status: Never    Smokeless tobacco: Never   Substance and Sexual Activity    Alcohol use: Not Currently     Social Determinants of Health     Financial Resource Strain: Low Risk  (3/27/2024)    Overall Financial Resource Strain (CARDIA)     Difficulty of Paying Living Expenses: Not hard at all   Food Insecurity: No Food Insecurity (12/4/2023)    Received from [facility]    Hunger Vital Sign     Worried About Running Out of Food in the Last Year: Never true     Ran Out of Food in the Last Year: Never true   Transportation Needs: No Transportation Needs (3/27/2024)    PRAPARE - Transportation     Lack of Transportation (Medical): No     Lack of Transportation (Non-Medical): No   Physical Activity: Not on File (2/26/2021)    Received from [facility]    Physical Activity     Physical Activity: 0   Stress: Not on File (2/26/2021)    Received from [facility]    Stress     Stress: 0   Social Connections: Not on File (2/26/2021)    Received from [facility]    Social Connections     Social Connections and Isolation: 0   Intimate Partner Violence: Not At Risk (12/4/2023)    Received from [facility]    Humiliation, Afraid, Rape, and Kick questionnaire     Fear of Current or Ex-Partner: No     Emotionally Abused: No     Physically Abused: No     Sexually Abused: No   Housing Stability: Low Risk  (3/27/2024)    Housing Stability Vital Sign     Unable to Pay for Housing in the Last Year: No     Number of Places Lived in the Last Year: 1     Unstable Housing in the Last Year:  No     Past Medical History:   Diagnosis Date    Acute cystitis with hematuria 12/31/2016    Acute cystitis with hematuria    Personal history of other diseases of the circulatory system     History of hypertension    Personal history of other diseases of the circulatory system     History of cardiac disorder    Personal history of other endocrine, nutritional and metabolic disease     History of diabetic neuropathy    Personal history of other endocrine, nutritional and metabolic disease     History of hyperlipidemia    Personal history of other endocrine, nutritional and metabolic disease     History of type 2 diabetes mellitus    Personal history of other specified conditions     History of insomnia     Past Surgical History:   Procedure Laterality Date    OTHER SURGICAL HISTORY  09/17/2019    Bypass    OTHER SURGICAL HISTORY  09/17/2019    Arterial stent placement       Charting was completed using voice recognition technology and may include unintended errors.

## 2024-09-17 ENCOUNTER — TELEPHONE (OUTPATIENT)
Dept: PRIMARY CARE | Facility: CLINIC | Age: 74
End: 2024-09-17
Payer: MEDICARE

## 2024-09-17 NOTE — TELEPHONE ENCOUNTER
----- Message from Nydia De La Paz sent at 9/16/2024  2:21 PM EDT -----  Labs stable from the hospital

## 2024-09-20 ENCOUNTER — PATIENT OUTREACH (OUTPATIENT)
Dept: PRIMARY CARE | Facility: CLINIC | Age: 74
End: 2024-09-20
Payer: MEDICARE

## 2024-09-20 NOTE — PROGRESS NOTES
Unable to reach patient for call back after patient's follow up appointment with PCP.   BECKAM with call back number for patient to call if needed   If no voicemail available call attempts x 2 were made to contact the patient to assist with any questions or concerns patient may have.

## 2024-10-09 ENCOUNTER — PATIENT OUTREACH (OUTPATIENT)
Dept: CARE COORDINATION | Facility: CLINIC | Age: 74
End: 2024-10-09
Payer: MEDICARE

## 2024-10-09 NOTE — PROGRESS NOTES
Discharge Facility: [facility]   Discharge Diagnosis: Bilateral pleural effusions J90  Chronic painful diabetic neuropathy E11.40  Congestive heart failure I50.9  Diabetes E11.9  Hypoxia R09.02  Insomnia G47.00  Peripheral edema R60.0   Admission Date: 9/24/24  Discharge Date: 10/8/24    PCP Appointment Date: Nydia De La Paz MD 11/15/24 tasked to office                                 Specialist Appointment Date: TBD  Hospital Encounter and Summary Linked: Yes    Two attempts were made to reach patient within two business days after discharge. Voicemail left with contact information for patient to call back with any non-emergent questions or concerns.

## 2024-10-15 ENCOUNTER — TELEPHONE (OUTPATIENT)
Dept: PRIMARY CARE | Facility: CLINIC | Age: 74
End: 2024-10-15
Payer: MEDICARE

## 2024-10-15 NOTE — TELEPHONE ENCOUNTER
----- Message from Diamond FELDER sent at 10/15/2024  2:08 PM EDT -----  Regarding: RE: TCM    ----- Message -----  From: Paola Niño RN  Sent: 10/9/2024   3:32 PM EDT  To:  [id number] Clerical  Subject: TCM                                              Hello,    Discharge facility: [facility]   Discharge diagnosis: Bilateral pleural effusions J90  Chronic painful diabetic neuropathy E11.40  Congestive heart failure I50.9  Diabetes E11.9  Hypoxia R09.02  Insomnia G47.00  Peripheral edema R60.0       Date of discharge: 10/8/24       Unsuccessful attempts x2 to reach patient for PCP Follow-up  Please have office staff reach out to patient and schedule an appointment within 14 days from discharge date.    Thank you

## 2024-10-15 NOTE — TELEPHONE ENCOUNTER
Patient discharged on 10/8/24. Received all discharge medications. Does not require immediate attention of the attending at this time. Scheduled for 10/23/24.

## 2024-10-23 ENCOUNTER — APPOINTMENT (OUTPATIENT)
Dept: PRIMARY CARE | Facility: CLINIC | Age: 74
End: 2024-10-23
Payer: MEDICARE

## 2024-10-23 ENCOUNTER — LAB (OUTPATIENT)
Dept: LAB | Facility: LAB | Age: 74
End: 2024-10-23
Payer: MEDICARE

## 2024-10-23 ENCOUNTER — PATIENT OUTREACH (OUTPATIENT)
Dept: PRIMARY CARE | Facility: CLINIC | Age: 74
End: 2024-10-23

## 2024-10-23 VITALS
BODY MASS INDEX: 23.46 KG/M2 | HEIGHT: 66 IN | OXYGEN SATURATION: 99 % | HEART RATE: 71 BPM | DIASTOLIC BLOOD PRESSURE: 74 MMHG | SYSTOLIC BLOOD PRESSURE: 136 MMHG | WEIGHT: 146 LBS

## 2024-10-23 DIAGNOSIS — F51.01 PRIMARY INSOMNIA: ICD-10-CM

## 2024-10-23 DIAGNOSIS — N18.30 STAGE 3 CHRONIC KIDNEY DISEASE, UNSPECIFIED WHETHER STAGE 3A OR 3B CKD (MULTI): Primary | ICD-10-CM

## 2024-10-23 DIAGNOSIS — I50.20 HFREF (HEART FAILURE WITH REDUCED EJECTION FRACTION): ICD-10-CM

## 2024-10-23 DIAGNOSIS — N18.30 STAGE 3 CHRONIC KIDNEY DISEASE, UNSPECIFIED WHETHER STAGE 3A OR 3B CKD (MULTI): ICD-10-CM

## 2024-10-23 LAB
ALBUMIN SERPL BCP-MCNC: 4.3 G/DL (ref 3.4–5)
ANION GAP SERPL CALC-SCNC: 14 MMOL/L (ref 10–20)
BUN SERPL-MCNC: 31 MG/DL (ref 6–23)
CALCIUM SERPL-MCNC: 9.5 MG/DL (ref 8.6–10.6)
CHLORIDE SERPL-SCNC: 101 MMOL/L (ref 98–107)
CO2 SERPL-SCNC: 27 MMOL/L (ref 21–32)
CREAT SERPL-MCNC: 2.39 MG/DL (ref 0.5–1.3)
EGFRCR SERPLBLD CKD-EPI 2021: 28 ML/MIN/1.73M*2
GLUCOSE SERPL-MCNC: 196 MG/DL (ref 74–99)
PHOSPHATE SERPL-MCNC: 4.1 MG/DL (ref 2.5–4.9)
POTASSIUM SERPL-SCNC: 4.9 MMOL/L (ref 3.5–5.3)
SODIUM SERPL-SCNC: 137 MMOL/L (ref 136–145)

## 2024-10-23 PROCEDURE — 3078F DIAST BP <80 MM HG: CPT | Performed by: STUDENT IN AN ORGANIZED HEALTH CARE EDUCATION/TRAINING PROGRAM

## 2024-10-23 PROCEDURE — 1036F TOBACCO NON-USER: CPT | Performed by: STUDENT IN AN ORGANIZED HEALTH CARE EDUCATION/TRAINING PROGRAM

## 2024-10-23 PROCEDURE — 99214 OFFICE O/P EST MOD 30 MIN: CPT | Performed by: STUDENT IN AN ORGANIZED HEALTH CARE EDUCATION/TRAINING PROGRAM

## 2024-10-23 PROCEDURE — 80069 RENAL FUNCTION PANEL: CPT

## 2024-10-23 PROCEDURE — 1159F MED LIST DOCD IN RCRD: CPT | Performed by: STUDENT IN AN ORGANIZED HEALTH CARE EDUCATION/TRAINING PROGRAM

## 2024-10-23 PROCEDURE — 90662 IIV NO PRSV INCREASED AG IM: CPT | Performed by: STUDENT IN AN ORGANIZED HEALTH CARE EDUCATION/TRAINING PROGRAM

## 2024-10-23 PROCEDURE — 1160F RVW MEDS BY RX/DR IN RCRD: CPT | Performed by: STUDENT IN AN ORGANIZED HEALTH CARE EDUCATION/TRAINING PROGRAM

## 2024-10-23 PROCEDURE — G2211 COMPLEX E/M VISIT ADD ON: HCPCS | Performed by: STUDENT IN AN ORGANIZED HEALTH CARE EDUCATION/TRAINING PROGRAM

## 2024-10-23 PROCEDURE — 36415 COLL VENOUS BLD VENIPUNCTURE: CPT

## 2024-10-23 PROCEDURE — G0008 ADMIN INFLUENZA VIRUS VAC: HCPCS | Performed by: STUDENT IN AN ORGANIZED HEALTH CARE EDUCATION/TRAINING PROGRAM

## 2024-10-23 PROCEDURE — 3075F SYST BP GE 130 - 139MM HG: CPT | Performed by: STUDENT IN AN ORGANIZED HEALTH CARE EDUCATION/TRAINING PROGRAM

## 2024-10-23 RX ORDER — ZOLPIDEM TARTRATE 10 MG/1
10 TABLET ORAL NIGHTLY
Qty: 30 TABLET | Refills: 0 | Status: SHIPPED | OUTPATIENT
Start: 2024-11-11 | End: 2024-12-11

## 2024-10-23 RX ORDER — POTASSIUM CHLORIDE 1500 MG/1
1 TABLET, EXTENDED RELEASE ORAL
COMMUNITY
Start: 2024-10-08

## 2024-10-23 RX ORDER — HYDRALAZINE HYDROCHLORIDE 10 MG/1
10 TABLET, FILM COATED ORAL 2 TIMES DAILY
COMMUNITY
Start: 2024-10-08

## 2024-10-23 RX ORDER — TORSEMIDE 20 MG/1
40 TABLET ORAL DAILY
Qty: 90 TABLET | Refills: 1 | Status: SHIPPED | OUTPATIENT
Start: 2024-10-23

## 2024-10-23 RX ORDER — ISOSORBIDE DINITRATE 10 MG/1
10 TABLET ORAL
COMMUNITY
Start: 2024-10-08

## 2024-10-23 RX ORDER — TRAZODONE HYDROCHLORIDE 50 MG/1
50 TABLET ORAL NIGHTLY
COMMUNITY
Start: 2024-10-08

## 2024-10-23 RX ORDER — DAPAGLIFLOZIN 10 MG/1
10 TABLET, FILM COATED ORAL DAILY
Qty: 30 TABLET | Refills: 2 | Status: SHIPPED | OUTPATIENT
Start: 2024-10-23

## 2024-10-23 RX ORDER — HYDROXYZINE HYDROCHLORIDE 50 MG/1
50 TABLET, FILM COATED ORAL AS NEEDED
COMMUNITY
Start: 2024-10-08

## 2024-10-23 ASSESSMENT — ENCOUNTER SYMPTOMS: PAIN: 1

## 2024-10-23 NOTE — PROGRESS NOTES
Call regarding hospitalization. Pt has an appointment with his PCP today and plans on attending. Pt reports he has all of his medications and plans on reviewing refills with PCP today. Pt reports he is breathing well since his discharge home from the hospital.  At time of outreach call the patient feels as if their condition has (improved) since last visit.  Reviewed the PCP appointment with the pt and addressed any questions or concerns.

## 2024-10-23 NOTE — PROGRESS NOTES
Subjective   Patient ID: Fredrick De La Paz is a 73 y.o. male who presents for follow up after his recent hospital admission with CHF exacerbation    AMW: 3/12/24; see chart     Assessment/Plan   Transition of Care Management  -Recently admitted to [facility] x 3 for acute CHF exacerbation  -Currently doing well. No edema or worsening SOB reported.   -Currently on GDMT with farxiga, aldactone, metoprolol.   -Losartan stopped due to worsening renal function   -Also on hydralazine and isordil.   -BP is mildly elevated at 144/86. Repeat is 136/74  -check labs today     R Foot Pain - resolved     Preventative Medicine  -UTD on vaccines. Flu shot to be given today   -Had colonoscopy done recently per patient. Results to be brought  -PSA checked by previous PCP. States that it was normal but has a hx of BPH.   -PHQ2: 0  -Alcohol screening done    CHF/HFrEF (EF 25%)  CAD  -Echo on 3/27/24 shows EF 25%  -Was prescribed GDMT but was unable to afford Entresto.  Continues to take [id number] mg p.o. daily  -Has frequent exacerbations  -Had cardiac cath  in March 2024.  No intervention done at that    PLAN  -Currently on GDMT with farxiga, aldactone, metoprolol.   -Losartan stopped due to renal function. Needs to follow up with nephrology.   -Continue torsemide 40mg PO daily   -Currently euvolemic   -Refer to EP for AICD. Given number to EP at [facility]  during previous appointment, Still has not made an appointment.  - Low salt diet    Pulmonary Nodule  -Seen in CT chest from March 2024  -Recommend 1 year repeat CT     VALERIE  -Hx of previous remote stenting and balloon angioplasty  -Continue to monitor renal function  -Follows with Dr Casillas (vascular surgery)     T2DM  -Regarding his DM, he is on Basalglar 12 units in the morning, Humalog 15 units with each meal (as his insurance covers humalog but not novolog). Last A1c was 7.6 August 2024. He is compliant with home medications at this time. States that he eats a high carb diet.  Advised patient to decrease carb intake  -States that sugars are averaged in the 170s per his CGM.   -Continue follow up with Endocrine.     CKD-III  -Continue ARB  -Continue Farxiga  -Avoid nephrotoxic medications     Insomnia   -Continue ambien - refill given     HTN  -Repeat 136/74  -Advised medication compliance and diet control  -Continue norvasc, isordil, metoprolol, hydralazine   -Low salt diet     OARRS Report Reviewed and appropriate.  UDS reviewed.  I have personally reviewed the OARRS report.  I have considered the risks of abuse, dependence, addiction and diversion. I believe that it is clinically appropriate for this patient to be prescribed this medication based on documented diagnosis.     Controlled Substance Agreement:   I have printed this form and reviewed each line item with the patient and the patient has verbalized understanding.   Date of the last Controlled Substance Agreement: 2/27/24  Date of UDS To be obtained    I discussed patient's OARRS report as well as the potential concern for overdose on prescribed opioid, sleep aid, gabapentin/Lyrica, and/or benzodiazepines. I explained that Overdose Risk Scores >460, require a Narcan prescription and places the patient at risk for potential death.      Patient understands that the risk of death can occur when using opioid, benzodiazepines, gabapentin, Lyrica, sleep aids, and illegal drugs. The risk of death especially increases when using the above medications and or illegal drugs in combination. I have reviewed with the patient and the patient is in agreement with the terms of the  Controlled Substance Agreement.      At this point in time, patient is in compliance with the above, and has no signs of dependence or addiction to the above prescribed medications.          Toe Pain     Pain    73M presents for transition of care appointment. Recently admitted 3x to [facility] for ADHF.     Stay was complicated by high O2 requirement, worsening renal  "function and requirement for milrinone. He did improve and was discharged home in stable, improved condition. ARB was stopped on discharge and he was started on hydralazine and isosorbide dinitrate.     Currently no SOB, MENG, or leg swelling reported. Has not followed up with cardiology, nephrology or endocrine since discharge.     CGM reviewed. Average BG seems to be around 170.     Pt requesting farxiga samples due to cost.     Will check renal function today. Advised to follow up with specialists in his care. He is agreeable.     Requesting refills as well on ambien (recently filled on 10/10/24) will give refill to start on 11/11/24.    Denies fevers, chills, weight loss, lightheadedness, dizziness, vision changes, sore throat, runny nose, CP, SOB, cough, palpitations, n/v/d, abd pain, black/bloody stools, arthralgias, mood disturbance, or new numbness/weakness/tingling in arms/legs/face.      All complaints and concerns addressed.    PMH: CKD, HFrEF, CAD, Insomnia, HTN, T2DM, VALERIE  Surgeries: PCI, renal artery stenting, PAD with stenting.     Social hx: Non smoker, denies Etoh drinking, no drugs, he med school lecturer, and is still working online part time    Visit Vitals  /86   Pulse 71   Ht 1.676 m (5' 6\")   Wt 66.2 kg (146 lb)   SpO2 99%   BMI 23.57 kg/m²   Smoking Status Never   BSA 1.76 m²     PHYSICAL EXAM     Physical Exam     Visit Vitals  /86   Pulse 71   Ht 1.676 m (5' 6\")   Wt 66.2 kg (146 lb)   SpO2 99%   BMI 23.57 kg/m²   Smoking Status Never   BSA 1.76 m²        General: NAD. NCAT. Aox3   HEENT: PERRLA. EOMI. MMM. Nares patent bl.  Cardiovascular: RRR. S1/S2 wnl. No JVD.   Respiratory: CTABL. No acute respiratory distress. No crackles  GI: Soft, NT abdomen.   MSK: ROM x 4. Weakness is improved.   Extremities: No BLLE edema.   Skin: No rashes or bruises.   Neuro: Aox3. Cranial Nerves grossly intact. Motor/sensory wnl.   Psych: Mood wnl.       REVIEW OF SYSTEMS     ROS in HPI   No Known " Allergies    Current Outpatient Medications   Medication Sig Dispense Refill    amLODIPine (Norvasc) 10 mg tablet Take 1 tablet (10 mg) by mouth once daily. 30 tablet 3    aspirin 81 mg EC tablet Take 1 tablet (81 mg) by mouth once daily.      atorvastatin (Lipitor) 40 mg tablet Take 1 tablet (40 mg) by mouth once daily. 90 tablet 3    Basaglar KwikPen U-100 Insulin 100 unit/mL (3 mL) pen Inject 12 Units under the skin once daily in the morning.      clopidogrel (Plavix) 75 mg tablet Take 1 tablet (75 mg) by mouth once daily. 30 tablet 3    dapagliflozin propanediol (Farxiga) 5 mg Take 2 tablets (10 mg) by mouth once daily.      diclofenac sodium (Voltaren) 1 % gel Apply 4.5 inches (4 g) topically 4 times a day. 100 g 1    hydrALAZINE (Apresoline) 10 mg tablet Take 1 tablet (10 mg) by mouth 2 times a day.      hydrOXYzine HCL (Atarax) 50 mg tablet Take 1 tablet (50 mg) by mouth if needed.      insulin lispro (HumaLOG U-100 Insulin) 100 unit/mL injection Inject 15 Units under the skin 2 times daily (morning and late afternoon). Take as directed per insulin instructions.      isosorbide dinitrate (Isordil) 10 mg tablet Take 1 tablet (10 mg) by mouth 2 times a day.      metoprolol succinate XL (Toprol-XL) 100 mg 24 hr tablet Take 1 tablet (100 mg) by mouth once daily.      multivitamin tablet Take 1 tablet by mouth once daily.      pantoprazole (ProtoNix) 40 mg EC tablet Take 1 tablet (40 mg) by mouth once daily in the morning. Take before meals.      potassium chloride CR 20 mEq ER tablet Take 1 tablet (20 mEq) by mouth early in the morning..      spironolactone (Aldactone) 25 mg tablet Take 1 tablet (25 mg) by mouth once daily.      torsemide (Demadex) 20 mg tablet Take 2 tablets (40 mg) by mouth once daily.      traZODone (Desyrel) 50 mg tablet Take 1 tablet (50 mg) by mouth once daily at bedtime.      allopurinol (Zyloprim) 100 mg tablet Take 1 tablet (100 mg) by mouth once daily. (Patient taking differently: Take  1 tablet (100 mg) by mouth 2 times a day.) 90 tablet 3    losartan (Cozaar) 25 mg tablet Take 1 tablet (25 mg) by mouth once daily. 60 tablet 3    zolpidem (Ambien) 10 mg tablet Take 1 tablet (10 mg) by mouth once daily at bedtime. 30 tablet 0     No current facility-administered medications for this visit.       Objective     Orders Only on 09/13/2024   Component Date Value Ref Range Status    [facility] Bilirubin, Total 09/13/2024 0.50  0.30 - 1.20 mg/dL Final    [facility] Chloride 09/13/2024 100  98 - 107 mmol/L Final    [facility] A/G Ratio 09/13/2024 1.2   Final    [facility] Na 09/13/2024 136  135 - 145 mmol/L Final    [facility] BUN/Creat Ratio 09/13/2024 15.5   Final    [facility] GPT 09/13/2024 35  10 - 49 unit/L Final    [facility] Creatinine 09/13/2024 3.1 (H)  0.6 - 1.1 mg/dL Final    [facility] Alk Phos 09/13/2024 79  45 - 117 unit/L Final    [facility] Total Protein 09/13/2024 6.9  5.7 - 8.2 g/dL Final    [facility] CO2, venous 09/13/2024 28.0  20.0 - 31.0 mmol/L Final    [facility] Glomerular Filtration R* 09/13/2024 20  mL/min/1.73m? Final    [facility] Calculated Osmolality 09/13/2024 294  275 - 295 mOsm/kg Final    [facility] K 09/13/2024 4.4  3.5 - 5.1 mmol/L Final    [facility] Globulin 09/13/2024 3.2  g/dL Final    [facility] BUN 09/13/2024 48 (H)  9 - 23 mg/dL Final    [facility] Calcium 09/13/2024 9.2  8.7 - 10.4 mg/dL Final    [facility] GOT 09/13/2024 45 (H)  15 - 37 unit/L Final    [facility] ALB 09/13/2024 3.7  3.4 - 5.0 g/dL Final    [facility] Glucose 09/13/2024 260 (H)  74 - 106 mg/dL Final    [facility] GFR AA 09/13/2024 24   Final   Lab on 07/03/2024   Component Date Value Ref Range Status    Uric Acid 07/03/2024 5.0  4.0 - 7.5 mg/dL Final       Radiology: Reviewed imaging in powerchart.  No results found.    No family history on file.  Social History     Socioeconomic History    Marital status:    Tobacco Use    Smoking status: Never    Smokeless tobacco: Never   Substance  and Sexual Activity    Alcohol use: Not Currently     Social Drivers of Health     Financial Resource Strain: Low Risk  (3/27/2024)    Overall Financial Resource Strain (CARDIA)     Difficulty of Paying Living Expenses: Not hard at all   Food Insecurity: No Food Insecurity (12/4/2023)    Received from [employer]    Hunger Vital Sign     Worried About Running Out of Food in the Last Year: Never true     Ran Out of Food in the Last Year: Never true   Transportation Needs: No Transportation Needs (3/27/2024)    PRAPARE - Transportation     Lack of Transportation (Medical): No     Lack of Transportation (Non-Medical): No   Physical Activity: Not on File (2/26/2021)    Received from [facility]    Physical Activity     Physical Activity: 0   Stress: Not on File (2/26/2021)    Received from [facility]    Stress     Stress: 0   Social Connections: Not on File (2/26/2021)    Received from [facility]    Social Connections     Social Connections and Isolation: 0   Intimate Partner Violence: Not At Risk (12/4/2023)    Received from [employer]    Humiliation, Afraid, Rape, and Kick questionnaire     Fear of Current or Ex-Partner: No     Emotionally Abused: No     Physically Abused: No     Sexually Abused: No   Housing Stability: Low Risk  (3/27/2024)    Housing Stability Vital Sign     Unable to Pay for Housing in the Last Year: No     Number of Places Lived in the Last Year: 1     Unstable Housing in the Last Year: No     Past Medical History:   Diagnosis Date    Acute cystitis with hematuria 12/31/2016    Acute cystitis with hematuria    Personal history of other diseases of the circulatory system     History of hypertension    Personal history of other diseases of the circulatory system     History of cardiac disorder    Personal history of other endocrine, nutritional and metabolic disease     History of diabetic neuropathy    Personal history of other endocrine, nutritional and metabolic  disease     History of hyperlipidemia    Personal history of other endocrine, nutritional and metabolic disease     History of type 2 diabetes mellitus    Personal history of other specified conditions     History of insomnia     Past Surgical History:   Procedure Laterality Date    OTHER SURGICAL HISTORY  09/17/2019    Bypass    OTHER SURGICAL HISTORY  09/17/2019    Arterial stent placement       Charting was completed using voice recognition technology and may include unintended errors.

## 2024-10-29 ENCOUNTER — TELEPHONE (OUTPATIENT)
Dept: PRIMARY CARE | Facility: CLINIC | Age: 74
End: 2024-10-29
Payer: MEDICARE

## 2024-10-30 ENCOUNTER — TELEPHONE (OUTPATIENT)
Dept: PRIMARY CARE | Facility: CLINIC | Age: 74
End: 2024-10-30
Payer: MEDICARE

## 2024-11-05 ENCOUNTER — APPOINTMENT (OUTPATIENT)
Dept: PRIMARY CARE | Facility: CLINIC | Age: 74
End: 2024-11-05
Payer: MEDICARE

## 2024-11-05 VITALS
BODY MASS INDEX: 23.78 KG/M2 | WEIGHT: 148 LBS | DIASTOLIC BLOOD PRESSURE: 68 MMHG | HEART RATE: 79 BPM | SYSTOLIC BLOOD PRESSURE: 142 MMHG | OXYGEN SATURATION: 100 % | HEIGHT: 66 IN

## 2024-11-05 DIAGNOSIS — Z79.4 TYPE 2 DIABETES MELLITUS WITH CHRONIC KIDNEY DISEASE, WITH LONG-TERM CURRENT USE OF INSULIN, UNSPECIFIED CKD STAGE (MULTI): ICD-10-CM

## 2024-11-05 DIAGNOSIS — E11.22 TYPE 2 DIABETES MELLITUS WITH CHRONIC KIDNEY DISEASE, WITH LONG-TERM CURRENT USE OF INSULIN, UNSPECIFIED CKD STAGE (MULTI): ICD-10-CM

## 2024-11-05 DIAGNOSIS — Z91.81 STATUS POST FALL: ICD-10-CM

## 2024-11-05 DIAGNOSIS — F51.01 PRIMARY INSOMNIA: ICD-10-CM

## 2024-11-05 DIAGNOSIS — S30.0XXA HEMATOMA OF LEFT BUTTOCK: Primary | ICD-10-CM

## 2024-11-05 DIAGNOSIS — I50.20 HFREF (HEART FAILURE WITH REDUCED EJECTION FRACTION): ICD-10-CM

## 2024-11-05 PROCEDURE — G2211 COMPLEX E/M VISIT ADD ON: HCPCS | Performed by: STUDENT IN AN ORGANIZED HEALTH CARE EDUCATION/TRAINING PROGRAM

## 2024-11-05 PROCEDURE — 3077F SYST BP >= 140 MM HG: CPT | Performed by: STUDENT IN AN ORGANIZED HEALTH CARE EDUCATION/TRAINING PROGRAM

## 2024-11-05 PROCEDURE — 3078F DIAST BP <80 MM HG: CPT | Performed by: STUDENT IN AN ORGANIZED HEALTH CARE EDUCATION/TRAINING PROGRAM

## 2024-11-05 PROCEDURE — 1036F TOBACCO NON-USER: CPT | Performed by: STUDENT IN AN ORGANIZED HEALTH CARE EDUCATION/TRAINING PROGRAM

## 2024-11-05 PROCEDURE — 1159F MED LIST DOCD IN RCRD: CPT | Performed by: STUDENT IN AN ORGANIZED HEALTH CARE EDUCATION/TRAINING PROGRAM

## 2024-11-05 PROCEDURE — 99213 OFFICE O/P EST LOW 20 MIN: CPT | Performed by: STUDENT IN AN ORGANIZED HEALTH CARE EDUCATION/TRAINING PROGRAM

## 2024-11-05 PROCEDURE — 1160F RVW MEDS BY RX/DR IN RCRD: CPT | Performed by: STUDENT IN AN ORGANIZED HEALTH CARE EDUCATION/TRAINING PROGRAM

## 2024-11-05 ASSESSMENT — ENCOUNTER SYMPTOMS: PAIN: 1

## 2024-11-05 NOTE — PROGRESS NOTES
Subjective   Patient ID: Fredrick De La Paz is a 73 y.o. male who presents for follow up after his recent hospital admission with CHF exacerbation    AMW: 3/12/24; see chart     Assessment/Plan   Status Post Fall  L Buttock Hematoma  -Had CT pelvis at [facility] was did not show STS. Currently there is palpable and visible STS over the left buttock  PLAN  -Repeat CT Pelvis  -Consider referral to ortho vs gen surg if needed  -Follow up in 2 weeks to look for resolution     R Foot Pain - resolved     Preventative Medicine  -UTD on vaccines. Flu shot UTD   -Had colonoscopy done recently per patient. Results to be brought  -PSA checked by previous PCP. States that it was normal but has a hx of BPH.   -PHQ2: 0  -Alcohol screening done    CHF/HFrEF (EF 25%)  CAD  -Echo on 3/27/24 shows EF 25%  -Was prescribed GDMT but was unable to afford Entresto.  Continues to take [id number] mg p.o. daily  -Has frequent exacerbations  -Had cardiac cath  in March 2024.  No intervention done at that    PLAN  -Currently on GDMT with farxiga, aldactone, metoprolol.   -Losartan stopped due to renal function. Needs to follow up with nephrology.   -Continue torsemide 40mg PO daily   -Currently euvolemic   -Refer to EP for AICD. Given number to EP at [facility]  during previous appointment, Still has not made an appointment.  - Low salt diet    Pulmonary Nodule  -Seen in CT chest from March 2024  -Recommend 1 year repeat CT     VALERIE  -Hx of previous remote stenting and balloon angioplasty  -Continue to monitor renal function  -Follows with Dr Casillas (vascular surgery)     T2DM  -Regarding his DM, he is on Basalglar 12 units in the morning, Humalog 15 units with each meal (as his insurance covers humalog but not novolog). Last A1c was 7.6 August 2024. He is compliant with home medications at this time. States that he eats a high carb diet. Advised patient to decrease carb intake  -States that sugars are averaged in the 170s per his CGM.   -Continue  follow up with Endocrine.     CKD-III  -Continue ARB  -Continue Farxiga  -Avoid nephrotoxic medications     Insomnia   -Continue ambien - refill given     HTN  -Repeat 136/74  -Advised medication compliance and diet control  -Continue norvasc, isordil, metoprolol, hydralazine   -Low salt diet     OARRS Report Reviewed and appropriate.  UDS reviewed.  I have personally reviewed the OARRS report.  I have considered the risks of abuse, dependence, addiction and diversion. I believe that it is clinically appropriate for this patient to be prescribed this medication based on documented diagnosis.     Controlled Substance Agreement:   I have printed this form and reviewed each line item with the patient and the patient has verbalized understanding.   Date of the last Controlled Substance Agreement: 2/27/24  Date of UDS To be obtained    I discussed patient's OARRS report as well as the potential concern for overdose on prescribed opioid, sleep aid, gabapentin/Lyrica, and/or benzodiazepines. I explained that Overdose Risk Scores >460, require a Narcan prescription and places the patient at risk for potential death.      Patient understands that the risk of death can occur when using opioid, benzodiazepines, gabapentin, Lyrica, sleep aids, and illegal drugs. The risk of death especially increases when using the above medications and or illegal drugs in combination. I have reviewed with the patient and the patient is in agreement with the terms of the  Controlled Substance Agreement.      At this point in time, patient is in compliance with the above, and has no signs of dependence or addiction to the above prescribed medications.          Toe Pain     Pain    Fall    73M presents for acute sick visit.     Recently tripped and fell from standing position with head strike into wall. Went to ED and had negative imaging of pelvis and brain. Now he has a large hematoma of the L buttock. Still has pain in the left buttock.  "    Denies fevers, chills, weight loss, lightheadedness, dizziness, vision changes, sore throat, runny nose, CP, SOB, cough, palpitations, n/v/d, abd pain, black/bloody stools, mood disturbance, or new numbness/weakness/tingling in arms/legs/face.      Has been having to use extra ambient to help with sleep 2/2 pain. Refill on ambien is not due until 11/9/24. Will give 4 day supply of belsomra to help in the interim.     Denies fevers, chills, weight loss, lightheadedness, dizziness, vision changes, sore throat, runny nose, CP, SOB, cough, palpitations, n/v/d, abd pain, black/bloody stools, arthralgias, mood disturbance, or new numbness/weakness/tingling in arms/legs/face.      All complaints and concerns addressed.    PMH: CKD, HFrEF, CAD, Insomnia, HTN, T2DM, VALERIE  Surgeries: PCI, renal artery stenting, PAD with stenting.     Social hx: Non smoker, denies Etoh drinking, no drugs, he med school lecturer, and is still working online part time    Visit Vitals  /68   Pulse 79   Ht 1.676 m (5' 6\")   Wt 67.1 kg (148 lb)   SpO2 100%   BMI 23.89 kg/m²   Smoking Status Never   BSA 1.77 m²     PHYSICAL EXAM     Physical Exam     Visit Vitals  /68   Pulse 79   Ht 1.676 m (5' 6\")   Wt 67.1 kg (148 lb)   SpO2 100%   BMI 23.89 kg/m²   Smoking Status Never   BSA 1.77 m²        General: NAD. NCAT. Aox3   HEENT: PERRLA. EOMI. MMM. Nares patent bl.  Cardiovascular: RRR. S1/S2 wnl. No JVD.   Respiratory: CTABL. No acute respiratory distress. No crackles  GI: Soft, NT abdomen.   MSK: ROM x 4. L buttock hematoma; large.   Extremities: No BLLE edema.   Skin: No rashes or bruises.   Neuro: Aox3. Cranial Nerves grossly intact. Motor/sensory wnl.   Psych: Mood wnl.       REVIEW OF SYSTEMS     ROS in HPI   No Known Allergies    Current Outpatient Medications   Medication Sig Dispense Refill    allopurinol (Zyloprim) 100 mg tablet Take 1 tablet (100 mg) by mouth once daily. (Patient taking differently: Take 1 tablet (100 mg) by " mouth 2 times a day.) 90 tablet 3    amLODIPine (Norvasc) 10 mg tablet Take 1 tablet (10 mg) by mouth once daily. 30 tablet 3    aspirin 81 mg EC tablet Take 1 tablet (81 mg) by mouth once daily.      atorvastatin (Lipitor) 40 mg tablet Take 1 tablet (40 mg) by mouth once daily. 90 tablet 3    Basaglar KwikPen U-100 Insulin 100 unit/mL (3 mL) pen Inject 12 Units under the skin once daily in the morning.      clopidogrel (Plavix) 75 mg tablet Take 1 tablet (75 mg) by mouth once daily. 30 tablet 3    dapagliflozin propanediol (Farxiga) 10 mg Take 1 tablet (10 mg) by mouth once daily. 30 tablet 2    diclofenac sodium (Voltaren) 1 % gel Apply 4.5 inches (4 g) topically 4 times a day. 100 g 1    hydrALAZINE (Apresoline) 10 mg tablet Take 1 tablet (10 mg) by mouth 2 times a day.      hydrOXYzine HCL (Atarax) 50 mg tablet Take 1 tablet (50 mg) by mouth if needed.      insulin lispro (HumaLOG U-100 Insulin) 100 unit/mL injection Inject 15 Units under the skin 2 times daily (morning and late afternoon). Take as directed per insulin instructions.      isosorbide dinitrate (Isordil) 10 mg tablet Take 1 tablet (10 mg) by mouth 2 times a day.      metoprolol succinate XL (Toprol-XL) 100 mg 24 hr tablet Take 1 tablet (100 mg) by mouth once daily.      multivitamin tablet Take 1 tablet by mouth once daily.      pantoprazole (ProtoNix) 40 mg EC tablet Take 1 tablet (40 mg) by mouth once daily in the morning. Take before meals.      potassium chloride CR 20 mEq ER tablet Take 1 tablet (20 mEq) by mouth early in the morning..      spironolactone (Aldactone) 25 mg tablet Take 1 tablet (25 mg) by mouth once daily.      torsemide (Demadex) 20 mg tablet Take 2 tablets (40 mg) by mouth once daily. 90 tablet 1    traZODone (Desyrel) 50 mg tablet Take 1 tablet (50 mg) by mouth once daily at bedtime.      [START ON 11/11/2024] zolpidem (Ambien) 10 mg tablet Take 1 tablet (10 mg) by mouth once daily at bedtime. Do not fill before November 11,  2024. 30 tablet 0     No current facility-administered medications for this visit.       Objective     Lab on 10/23/2024   Component Date Value Ref Range Status    Glucose 10/23/2024 196 (H)  74 - 99 mg/dL Final    Sodium 10/23/2024 137  136 - 145 mmol/L Final    Potassium 10/23/2024 4.9  3.5 - 5.3 mmol/L Final    Chloride 10/23/2024 101  98 - 107 mmol/L Final    Bicarbonate 10/23/2024 27  21 - 32 mmol/L Final    Anion Gap 10/23/2024 14  10 - 20 mmol/L Final    Urea Nitrogen 10/23/2024 31 (H)  6 - 23 mg/dL Final    Creatinine 10/23/2024 2.39 (H)  0.50 - 1.30 mg/dL Final    eGFR 10/23/2024 28 (L)  >60 mL/min/1.73m*2 Final    Calcium 10/23/2024 9.5  8.6 - 10.6 mg/dL Final    Phosphorus 10/23/2024 4.1  2.5 - 4.9 mg/dL Final    Albumin 10/23/2024 4.3  3.4 - 5.0 g/dL Final   Orders Only on 09/13/2024   Component Date Value Ref Range Status    [facility] Bilirubin, Total 09/13/2024 0.50  0.30 - 1.20 mg/dL Final    [facility] Chloride 09/13/2024 100  98 - 107 mmol/L Final    [facility] A/G Ratio 09/13/2024 1.2   Final    [facility] Na 09/13/2024 136  135 - 145 mmol/L Final    [facility] BUN/Creat Ratio 09/13/2024 15.5   Final    [facility] GPT 09/13/2024 35  10 - 49 unit/L Final    [facility] Creatinine 09/13/2024 3.1 (H)  0.6 - 1.1 mg/dL Final    [facility] Alk Phos 09/13/2024 79  45 - 117 unit/L Final    [facility] Total Protein 09/13/2024 6.9  5.7 - 8.2 g/dL Final    [facility] CO2, venous 09/13/2024 28.0  20.0 - 31.0 mmol/L Final    [facility] Glomerular Filtration R* 09/13/2024 20  mL/min/1.73m? Final    [facility] Calculated Osmolality 09/13/2024 294  275 - 295 mOsm/kg Final    [facility] K 09/13/2024 4.4  3.5 - 5.1 mmol/L Final    [facility] Globulin 09/13/2024 3.2  g/dL Final    [facility] BUN 09/13/2024 48 (H)  9 - 23 mg/dL Final    [facility] Calcium 09/13/2024 9.2  8.7 - 10.4 mg/dL Final    [facility] GOT 09/13/2024 45 (H)  15 - 37 unit/L Final    [facility] ALB 09/13/2024 3.7  3.4 - 5.0 g/dL Final    [facility]  Glucose 09/13/2024 260 (H)  74 - 106 mg/dL Final    [facility] GFR AA 09/13/2024 24   Final       Radiology: Reviewed imaging in powerchart.  No results found.    No family history on file.  Social History     Socioeconomic History    Marital status:    Tobacco Use    Smoking status: Never    Smokeless tobacco: Never   Substance and Sexual Activity    Alcohol use: Not Currently     Social Drivers of Health     Financial Resource Strain: Low Risk  (3/27/2024)    Overall Financial Resource Strain (CARDIA)     Difficulty of Paying Living Expenses: Not hard at all   Food Insecurity: No Food Insecurity (12/4/2023)    Received from [facility]    Hunger Vital Sign     Worried About Running Out of Food in the Last Year: Never true     Ran Out of Food in the Last Year: Never true   Transportation Needs: No Transportation Needs (3/27/2024)    PRAPARE - Transportation     Lack of Transportation (Medical): No     Lack of Transportation (Non-Medical): No   Physical Activity: Not on File (2/26/2021)    Received from [facility]    Physical Activity     Physical Activity: 0   Stress: Not on File (2/26/2021)    Received from [facility]    Stress     Stress: 0   Social Connections: Not on File (2/26/2021)    Received from [facility]    Social Connections     Social Connections and Isolation: 0   Intimate Partner Violence: Not At Risk (12/4/2023)    Received from [facility]    Humiliation, Afraid, Rape, and Kick questionnaire     Fear of Current or Ex-Partner: No     Emotionally Abused: No     Physically Abused: No     Sexually Abused: No   Housing Stability: Low Risk  (3/27/2024)    Housing Stability Vital Sign     Unable to Pay for Housing in the Last Year: No     Number of Places Lived in the Last Year: 1     Unstable Housing in the Last Year: No     Past Medical History:   Diagnosis Date    Acute cystitis with hematuria 12/31/2016    Acute cystitis with hematuria    Personal history of  other diseases of the circulatory system     History of hypertension    Personal history of other diseases of the circulatory system     History of cardiac disorder    Personal history of other endocrine, nutritional and metabolic disease     History of diabetic neuropathy    Personal history of other endocrine, nutritional and metabolic disease     History of hyperlipidemia    Personal history of other endocrine, nutritional and metabolic disease     History of type 2 diabetes mellitus    Personal history of other specified conditions     History of insomnia     Past Surgical History:   Procedure Laterality Date    OTHER SURGICAL HISTORY  09/17/2019    Bypass    OTHER SURGICAL HISTORY  09/17/2019    Arterial stent placement       Charting was completed using voice recognition technology and may include unintended errors.

## 2024-11-06 ENCOUNTER — TELEPHONE (OUTPATIENT)
Dept: PRIMARY CARE | Facility: CLINIC | Age: 74
End: 2024-11-06

## 2024-11-06 ENCOUNTER — TELEPHONE (OUTPATIENT)
Dept: PRIMARY CARE | Facility: CLINIC | Age: 74
End: 2024-11-06
Payer: MEDICARE

## 2024-11-06 DIAGNOSIS — F51.01 PRIMARY INSOMNIA: ICD-10-CM

## 2024-11-06 NOTE — TELEPHONE ENCOUNTER
Pharmacy stated belsomra only comes in a pack of 10 so needs new prescription sent over for quantity of 10 tablets.

## 2024-11-07 ENCOUNTER — PATIENT OUTREACH (OUTPATIENT)
Dept: PRIMARY CARE | Facility: CLINIC | Age: 74
End: 2024-11-07
Payer: MEDICARE

## 2024-11-07 NOTE — TELEPHONE ENCOUNTER
----- Message from Nydia De La Paz sent at 11/7/2024 12:20 AM EST -----  ST hematoma present. Monitor. If area enlarges, pain worsens, can refer to general surgery (Dr Nicholas) for further evaluation.

## 2024-11-07 NOTE — PROGRESS NOTES
Successful outreach to patient regarding hospitalization as patient continues TCM program.   At time of outreach call the patient feels as if their condition has (returned to baseline) since initial visit with PCP or specialist. Pt reports his breathing is good for now but the fluid in his lungs starts to accumulate every few months. The pt also reports he fell at home 10 days ago and still has a hematoma on his left buttocks. Pt is waiting to hear from Gen Surg for follow up and treatment.   Questions or concerns addressed at this time with patient.   Provided contact information to patient if any further non-emergent needs arise.

## 2024-11-14 ENCOUNTER — APPOINTMENT (OUTPATIENT)
Dept: SURGERY | Facility: CLINIC | Age: 74
End: 2024-11-14
Payer: MEDICARE

## 2024-11-14 VITALS
DIASTOLIC BLOOD PRESSURE: 82 MMHG | OXYGEN SATURATION: 98 % | HEART RATE: 92 BPM | WEIGHT: 145 LBS | SYSTOLIC BLOOD PRESSURE: 140 MMHG | BODY MASS INDEX: 23.4 KG/M2 | TEMPERATURE: 97 F

## 2024-11-14 DIAGNOSIS — S70.02XA HIP HEMATOMA, LEFT, INITIAL ENCOUNTER: Primary | ICD-10-CM

## 2024-11-14 PROCEDURE — 3079F DIAST BP 80-89 MM HG: CPT | Performed by: SURGERY

## 2024-11-14 PROCEDURE — 1036F TOBACCO NON-USER: CPT | Performed by: SURGERY

## 2024-11-14 PROCEDURE — 3077F SYST BP >= 140 MM HG: CPT | Performed by: SURGERY

## 2024-11-14 PROCEDURE — 99215 OFFICE O/P EST HI 40 MIN: CPT | Performed by: SURGERY

## 2024-11-14 PROCEDURE — 1160F RVW MEDS BY RX/DR IN RCRD: CPT | Performed by: SURGERY

## 2024-11-14 PROCEDURE — 1159F MED LIST DOCD IN RCRD: CPT | Performed by: SURGERY

## 2024-11-14 NOTE — PROGRESS NOTES
Subjective   Patient ID: Fredrick De La Paz is a 73 y.o. male who presents for New Patient Visit (Hematoma on buttocks  ct swg).  The patient fell on the October 30s, he lost his balance because of foot problems.  Patient was on the Plavix because of stent, he developed a lump in the left gluteal area immediately.  Patient was seen in the emergency department.  At that time workup was negative, patient was discharged.  Patient had repeated CT scan at the primary care physician recommendation and in November 6, it showed evidence of hematoma deep intramuscular in the left gluteal area.    HPI described above.  Patient still complaints and the pain in the left gluteal area  Review of Systems musculoskeletal consistent with a palpable lump and pain in the left gluteal area.  Review of all other 14 system is negative  Physical Exam pupils equal bilaterally, mucosa moist, bilateral breath sounds, regular rate, clear to auscultation, abdomen soft, nontender, palpable peripheral pulse, no focal neurological motor deficits.  There is a palpable deep large mass in the left gluteal area.  Tender in the palpation.  No significant bruising.    Objective I reviewed all available data including lab results, radiological studies, previous reports and notes.  Personally reviewed all available imaging.  There were CT scan of the neck brain and abdomen pelvis from October 30s.  There was no evidence of traumatic injuries.  There was some arthritic changes in the neck and patient had a neck discomfort, therefore I recommend to follow-up with a primary care physician.  CT scan from November 6 showed evidence of left gluteal intramuscular hematoma.  Upon reviewing of the CT scan there was a evidence of hematoma on the both CT scan from the October 30 7 November 6.  Even though it was not mentioned on the October 30 official report.    No diagnosis found.   Patient Active Problem List   Diagnosis    Atherosclerosis of coronary artery bypass  graft    Stage 3 chronic kidney disease (Multi)    Type 2 diabetes mellitus with diabetic chronic kidney disease    Insomnia    Chronic combined systolic and diastolic congestive heart failure    Elevated troponin    NSTEMI (non-ST elevated myocardial infarction) (Multi)      No Known Allergies   Medication Documentation Review Audit       Reviewed by Octavio Nicholas MD (Physician) on 11/14/24 at 0908      Medication Order Taking? Sig Documenting Provider Last Dose Status   allopurinol (Zyloprim) 100 mg tablet [id number] No Take 1 tablet (100 mg) by mouth once daily.   Patient taking differently: Take 1 tablet (100 mg) by mouth 2 times a day.    Nydia De La Paz MD Taking Active   amLODIPine (Norvasc) 10 mg tablet [id number] No Take 1 tablet (10 mg) by mouth once daily. Nydia De La Paz MD Taking Active   aspirin 81 mg EC tablet [id number]  Take 1 tablet (81 mg) by mouth once daily. Historical Provider, MD  Active   atorvastatin (Lipitor) 40 mg tablet [id number] No Take 1 tablet (40 mg) by mouth once daily. Ken Tam MD Taking Active   Basaglar KwikPen U-100 Insulin 100 unit/mL (3 mL) pen [id number] No Inject 12 Units under the skin once daily in the morning. Historical Provider, MD Taking Active   clopidogrel (Plavix) 75 mg tablet [id number] No Take 1 tablet (75 mg) by mouth once daily. Nydia De La Paz MD Taking Active   dapagliflozin propanediol (Farxiga) 10 mg [id number]  Take 1 tablet (10 mg) by mouth once daily. Nydia De La Paz MD  Active   diclofenac sodium (Voltaren) 1 % gel [id number] No Apply 4.5 inches (4 g) topically 4 times a day. Nydia De La Paz MD Taking Active   hydrALAZINE (Apresoline) 10 mg tablet [id number]  Take 1 tablet (10 mg) by mouth 2 times a day. Historical Provider, MD  Active   hydrOXYzine HCL (Atarax) 50 mg tablet [id number]  Take 1 tablet (50 mg) by mouth if needed. Historical Provider, MD  Active   insulin lispro (HumaLOG U-100 Insulin) 100 unit/mL injection [id number]  Inject 15 Units  under the skin 2 times daily (morning and late afternoon). Take as directed per insulin instructions. Nydia De La Paz MD  Active   isosorbide dinitrate (Isordil) 10 mg tablet [id number]  Take 1 tablet (10 mg) by mouth 2 times a day. Franklin Snider MD  Active   metoprolol succinate XL (Toprol-XL) 100 mg 24 hr tablet [id number] No Take 1 tablet (100 mg) by mouth once daily. Franklin Provider, MD Taking Active   multivitamin tablet [id number] No Take 1 tablet by mouth once daily. Franklin Snider MD Taking Active   pantoprazole (ProtoNix) 40 mg EC tablet [id number] No Take 1 tablet (40 mg) by mouth once daily in the morning. Take before meals. Franklin Snider MD Taking Active   potassium chloride CR 20 mEq ER tablet [id number]  Take 1 tablet (20 mEq) by mouth early in the morning.. Franklin Snider MD  Active   spironolactone (Aldactone) 25 mg tablet [id number] No Take 1 tablet (25 mg) by mouth once daily. Franklin Snider MD Taking Active   suvorexant (Belsomra) 10 mg tablet [id number]  Take 1 tablet (10 mg) by mouth as needed at bedtime for sleep for up to 10 days. Nydia De La Paz MD  Active   torsemide (Demadex) 20 mg tablet [id number]  Take 2 tablets (40 mg) by mouth once daily. Nydia De La Paz MD  Active   traZODone (Desyrel) 50 mg tablet [id number]  Take 1 tablet (50 mg) by mouth once daily at bedtime. Franklin Snider MD  Active   zolpidem (Ambien) 10 mg tablet [id number]  Take 1 tablet (10 mg) by mouth once daily at bedtime. Do not fill before November 11, 2024. Nydia De La Paz MD  Active                    Past Medical History:   Diagnosis Date    Acute cystitis with hematuria 12/31/2016    Acute cystitis with hematuria    Personal history of other diseases of the circulatory system     History of hypertension    Personal history of other diseases of the circulatory system     History of cardiac disorder    Personal history of other endocrine, nutritional and metabolic disease     History of  diabetic neuropathy    Personal history of other endocrine, nutritional and metabolic disease     History of hyperlipidemia    Personal history of other endocrine, nutritional and metabolic disease     History of type 2 diabetes mellitus    Personal history of other specified conditions     History of insomnia     Social History     Tobacco Use   Smoking Status Never   Smokeless Tobacco Never     No family history on file.   Past Surgical History:   Procedure Laterality Date    OTHER SURGICAL HISTORY  09/17/2019    Bypass    OTHER SURGICAL HISTORY  09/17/2019    Arterial stent placement       Assessment/Plan   With a deep intramuscular hematoma in the left gluteal area.  Patient has a minimal symptoms.  Considering minimal symptoms, as well as anticoagulation with the Plavix, I do not recommend incision and drainage of this hematoma.  Most likely this hematoma will reabsorb spontaneously in 3 to 6 months.  Patient will need surgical intervention only he developed signs of the infection.  I will see patient back in office for reassessment in 1 months.  Further management depends on development of clinical picture.  No indication for surgical intervention.  The needle aspiration will be noneffective because of consistency of hematoma.      Octavio Nicholas MD

## 2024-11-15 ENCOUNTER — APPOINTMENT (OUTPATIENT)
Dept: PRIMARY CARE | Facility: CLINIC | Age: 74
End: 2024-11-15
Payer: MEDICARE

## 2024-12-10 DIAGNOSIS — Z79.899 ON POTASSIUM WASTING DIURETIC THERAPY: Primary | ICD-10-CM

## 2024-12-10 RX ORDER — POTASSIUM CHLORIDE 1500 MG/1
20 TABLET, EXTENDED RELEASE ORAL
Qty: 30 TABLET | Refills: 3 | Status: SHIPPED | OUTPATIENT
Start: 2024-12-10

## 2024-12-12 ENCOUNTER — APPOINTMENT (OUTPATIENT)
Dept: SURGERY | Facility: CLINIC | Age: 74
End: 2024-12-12
Payer: MEDICARE

## 2024-12-12 VITALS
OXYGEN SATURATION: 97 % | SYSTOLIC BLOOD PRESSURE: 140 MMHG | TEMPERATURE: 96.4 F | DIASTOLIC BLOOD PRESSURE: 80 MMHG | HEART RATE: 78 BPM

## 2024-12-12 DIAGNOSIS — S70.02XA HIP HEMATOMA, LEFT, INITIAL ENCOUNTER: Primary | ICD-10-CM

## 2024-12-12 PROCEDURE — 1036F TOBACCO NON-USER: CPT | Performed by: SURGERY

## 2024-12-12 PROCEDURE — 1160F RVW MEDS BY RX/DR IN RCRD: CPT | Performed by: SURGERY

## 2024-12-12 PROCEDURE — 3079F DIAST BP 80-89 MM HG: CPT | Performed by: SURGERY

## 2024-12-12 PROCEDURE — 99212 OFFICE O/P EST SF 10 MIN: CPT | Performed by: SURGERY

## 2024-12-12 PROCEDURE — 3077F SYST BP >= 140 MM HG: CPT | Performed by: SURGERY

## 2024-12-12 PROCEDURE — 1159F MED LIST DOCD IN RCRD: CPT | Performed by: SURGERY

## 2024-12-12 NOTE — PROGRESS NOTES
Subjective   Patient ID: Fredrick De La Paz is a 73 y.o. male who presents for Follow-up (Hematoma ).  Had a left gluteal hematoma.  Improvement since last visit    HPI left gluteal hematoma after fall, on the Plavix  Review of Systems review of all 14 system is negative  Physical Exam there is significant decrease in the size of the hematoma without evidence of cellulitis.  Otherwise exam without changes compared to previous exam    Objective I reviewed all available data including lab results, radiological studies, previous reports and notes.    No diagnosis found.   Patient Active Problem List   Diagnosis    Atherosclerosis of coronary artery bypass graft    Stage 3 chronic kidney disease (Multi)    Type 2 diabetes mellitus with diabetic chronic kidney disease    Insomnia    Chronic combined systolic and diastolic congestive heart failure    Elevated troponin    NSTEMI (non-ST elevated myocardial infarction) (Multi)      No Known Allergies   Medication Documentation Review Audit       Reviewed by Octavio Nicholas MD (Physician) on 12/12/24 at 1017      Medication Order Taking? Sig Documenting Provider Last Dose Status   allopurinol (Zyloprim) 100 mg tablet [id number] No Take 1 tablet (100 mg) by mouth once daily.   Patient taking differently: Take 1 tablet (100 mg) by mouth 2 times a day.    Nydia De La Paz MD Taking Active   amLODIPine (Norvasc) 10 mg tablet [id number] No Take 1 tablet (10 mg) by mouth once daily. Nydia De La Paz MD Taking Active   aspirin 81 mg EC tablet [id number]  Take 1 tablet (81 mg) by mouth once daily. Historical Provider, MD  Active   atorvastatin (Lipitor) 40 mg tablet [id number] No Take 1 tablet (40 mg) by mouth once daily. Ken Tam MD Taking Active   Basaglar KwikPen U-100 Insulin 100 unit/mL (3 mL) pen [id number] No Inject 12 Units under the skin once daily in the morning. Historical Provider, MD Taking Active   clopidogrel (Plavix) 75 mg tablet [id number] No Take 1 tablet (75 mg) by  mouth once daily. Nydia De La Paz MD Taking Active   dapagliflozin propanediol (Farxiga) 10 mg [id number]  Take 1 tablet (10 mg) by mouth once daily. Nydia De La Paz MD  Active   diclofenac sodium (Voltaren) 1 % gel [id number] No Apply 4.5 inches (4 g) topically 4 times a day. Nydia De La Paz MD Taking Active   hydrALAZINE (Apresoline) 10 mg tablet [id number]  Take 1 tablet (10 mg) by mouth 2 times a day. Franklin Snider MD  Active   hydrOXYzine HCL (Atarax) 50 mg tablet [id number]  Take 1 tablet (50 mg) by mouth if needed. Franklin Snider MD  Active   insulin lispro (HumaLOG U-100 Insulin) 100 unit/mL injection [id number]  Inject 15 Units under the skin 2 times daily (morning and late afternoon). Take as directed per insulin instructions. Nydia De La Paz MD  Active   isosorbide dinitrate (Isordil) 10 mg tablet [id number]  Take 1 tablet (10 mg) by mouth 2 times a day. Franklin Snider MD  Active   metoprolol succinate XL (Toprol-XL) 100 mg 24 hr tablet [id number] No Take 1 tablet (100 mg) by mouth once daily. Franklin Snider MD Taking Active   multivitamin tablet [id number] No Take 1 tablet by mouth once daily. Franklin Snider MD Taking Active   pantoprazole (ProtoNix) 40 mg EC tablet [id number] No Take 1 tablet (40 mg) by mouth once daily in the morning. Take before meals. Franklin Snider MD Taking Active     Discontinued 12/10/24 1220   potassium chloride CR 20 mEq ER tablet [id number]  Take 1 tablet (20 mEq) by mouth early in the morning.. Nydia De La Paz MD  Active   spironolactone (Aldactone) 25 mg tablet [id number] No Take 1 tablet (25 mg) by mouth once daily. Franklin Snider MD Taking Active   suvorexant (Belsomra) 10 mg tablet [id number]  Take 1 tablet (10 mg) by mouth as needed at bedtime for sleep for up to 10 days. Nydia De La Paz MD   24 2332   torsemide (Demadex) 20 mg tablet [id number]  Take 2 tablets (40 mg) by mouth once daily. Nydia De La Paz MD  Active   traZODone  (Desyrel) 50 mg tablet [id number]  Take 1 tablet (50 mg) by mouth once daily at bedtime. Historical Provider, MD  Active   zolpidem (Ambien) 10 mg tablet [id number]  Take 1 tablet (10 mg) by mouth once daily at bedtime. Do not fill before 2024. Nydia De La Paz MD   24 0645                    Past Medical History:   Diagnosis Date    Acute cystitis with hematuria 2016    Acute cystitis with hematuria    Personal history of other diseases of the circulatory system     History of hypertension    Personal history of other diseases of the circulatory system     History of cardiac disorder    Personal history of other endocrine, nutritional and metabolic disease     History of diabetic neuropathy    Personal history of other endocrine, nutritional and metabolic disease     History of hyperlipidemia    Personal history of other endocrine, nutritional and metabolic disease     History of type 2 diabetes mellitus    Personal history of other specified conditions     History of insomnia     Social History     Tobacco Use   Smoking Status Never   Smokeless Tobacco Never     No family history on file.   Past Surgical History:   Procedure Laterality Date    OTHER SURGICAL HISTORY  2019    Bypass    OTHER SURGICAL HISTORY  2019    Arterial stent placement       Assessment/Plan   Patient with left gluteal hematoma.  Significant improvement.  No evidence of complications.  No indication for surgery.  Follow-up as needed      Octavio Nicholas MD

## 2024-12-18 ENCOUNTER — APPOINTMENT (OUTPATIENT)
Dept: PRIMARY CARE | Facility: CLINIC | Age: 74
End: 2024-12-18
Payer: MEDICARE

## 2024-12-18 ENCOUNTER — LAB (OUTPATIENT)
Dept: LAB | Facility: LAB | Age: 74
End: 2024-12-18
Payer: MEDICARE

## 2024-12-18 VITALS
OXYGEN SATURATION: 99 % | BODY MASS INDEX: 24.59 KG/M2 | HEART RATE: 80 BPM | DIASTOLIC BLOOD PRESSURE: 82 MMHG | HEIGHT: 66 IN | WEIGHT: 153 LBS | SYSTOLIC BLOOD PRESSURE: 134 MMHG

## 2024-12-18 DIAGNOSIS — N18.32 TYPE 2 DIABETES MELLITUS WITH STAGE 3B CHRONIC KIDNEY DISEASE, WITH LONG-TERM CURRENT USE OF INSULIN (MULTI): ICD-10-CM

## 2024-12-18 DIAGNOSIS — E11.22 TYPE 2 DIABETES MELLITUS WITH STAGE 3B CHRONIC KIDNEY DISEASE, WITH LONG-TERM CURRENT USE OF INSULIN (MULTI): ICD-10-CM

## 2024-12-18 DIAGNOSIS — N18.30 STAGE 3 CHRONIC KIDNEY DISEASE, UNSPECIFIED WHETHER STAGE 3A OR 3B CKD (MULTI): ICD-10-CM

## 2024-12-18 DIAGNOSIS — I50.20 HFREF (HEART FAILURE WITH REDUCED EJECTION FRACTION): ICD-10-CM

## 2024-12-18 DIAGNOSIS — Z79.899 ON POTASSIUM WASTING DIURETIC THERAPY: ICD-10-CM

## 2024-12-18 DIAGNOSIS — N40.0 BENIGN PROSTATIC HYPERPLASIA, UNSPECIFIED WHETHER LOWER URINARY TRACT SYMPTOMS PRESENT: ICD-10-CM

## 2024-12-18 DIAGNOSIS — F51.01 PRIMARY INSOMNIA: ICD-10-CM

## 2024-12-18 DIAGNOSIS — N40.0 BENIGN PROSTATIC HYPERPLASIA, UNSPECIFIED WHETHER LOWER URINARY TRACT SYMPTOMS PRESENT: Primary | ICD-10-CM

## 2024-12-18 DIAGNOSIS — K21.9 GASTROESOPHAGEAL REFLUX DISEASE, UNSPECIFIED WHETHER ESOPHAGITIS PRESENT: ICD-10-CM

## 2024-12-18 DIAGNOSIS — Z79.4 TYPE 2 DIABETES MELLITUS WITH STAGE 3B CHRONIC KIDNEY DISEASE, WITH LONG-TERM CURRENT USE OF INSULIN (MULTI): ICD-10-CM

## 2024-12-18 LAB
ALBUMIN SERPL BCP-MCNC: 4.3 G/DL (ref 3.4–5)
ALP SERPL-CCNC: 60 U/L (ref 33–136)
ALT SERPL W P-5'-P-CCNC: 10 U/L (ref 10–52)
ANION GAP SERPL CALC-SCNC: 15 MMOL/L (ref 10–20)
AST SERPL W P-5'-P-CCNC: 13 U/L (ref 9–39)
BASOPHILS # BLD AUTO: 0.03 X10*3/UL (ref 0–0.1)
BASOPHILS NFR BLD AUTO: 0.4 %
BILIRUB SERPL-MCNC: 0.6 MG/DL (ref 0–1.2)
BUN SERPL-MCNC: 50 MG/DL (ref 6–23)
CALCIUM SERPL-MCNC: 9.4 MG/DL (ref 8.6–10.6)
CHLORIDE SERPL-SCNC: 102 MMOL/L (ref 98–107)
CO2 SERPL-SCNC: 25 MMOL/L (ref 21–32)
CREAT SERPL-MCNC: 2.5 MG/DL (ref 0.5–1.3)
EGFRCR SERPLBLD CKD-EPI 2021: 26 ML/MIN/1.73M*2
EOSINOPHIL # BLD AUTO: 0.21 X10*3/UL (ref 0–0.4)
EOSINOPHIL NFR BLD AUTO: 3.1 %
ERYTHROCYTE [DISTWIDTH] IN BLOOD BY AUTOMATED COUNT: 14.5 % (ref 11.5–14.5)
GLUCOSE SERPL-MCNC: 202 MG/DL (ref 74–99)
HCT VFR BLD AUTO: 41.1 % (ref 41–52)
HGB BLD-MCNC: 13.2 G/DL (ref 13.5–17.5)
IMM GRANULOCYTES # BLD AUTO: 0.01 X10*3/UL (ref 0–0.5)
IMM GRANULOCYTES NFR BLD AUTO: 0.1 % (ref 0–0.9)
LYMPHOCYTES # BLD AUTO: 1.95 X10*3/UL (ref 0.8–3)
LYMPHOCYTES NFR BLD AUTO: 29.1 %
MCH RBC QN AUTO: 30.2 PG (ref 26–34)
MCHC RBC AUTO-ENTMCNC: 32.1 G/DL (ref 32–36)
MCV RBC AUTO: 94 FL (ref 80–100)
MONOCYTES # BLD AUTO: 0.55 X10*3/UL (ref 0.05–0.8)
MONOCYTES NFR BLD AUTO: 8.2 %
NEUTROPHILS # BLD AUTO: 3.96 X10*3/UL (ref 1.6–5.5)
NEUTROPHILS NFR BLD AUTO: 59.1 %
NRBC BLD-RTO: 0 /100 WBCS (ref 0–0)
PLATELET # BLD AUTO: 190 X10*3/UL (ref 150–450)
POC HEMOGLOBIN A1C: 8 % (ref 4.2–6.5)
POTASSIUM SERPL-SCNC: 5.1 MMOL/L (ref 3.5–5.3)
PROT SERPL-MCNC: 7.4 G/DL (ref 6.4–8.2)
PSA SERPL-MCNC: 0.4 NG/ML
RBC # BLD AUTO: 4.37 X10*6/UL (ref 4.5–5.9)
SODIUM SERPL-SCNC: 137 MMOL/L (ref 136–145)
TSH SERPL-ACNC: 2.23 MIU/L (ref 0.44–3.98)
WBC # BLD AUTO: 6.7 X10*3/UL (ref 4.4–11.3)

## 2024-12-18 PROCEDURE — 84443 ASSAY THYROID STIM HORMONE: CPT

## 2024-12-18 PROCEDURE — 1036F TOBACCO NON-USER: CPT | Performed by: STUDENT IN AN ORGANIZED HEALTH CARE EDUCATION/TRAINING PROGRAM

## 2024-12-18 PROCEDURE — 3079F DIAST BP 80-89 MM HG: CPT | Performed by: STUDENT IN AN ORGANIZED HEALTH CARE EDUCATION/TRAINING PROGRAM

## 2024-12-18 PROCEDURE — 83036 HEMOGLOBIN GLYCOSYLATED A1C: CPT | Performed by: STUDENT IN AN ORGANIZED HEALTH CARE EDUCATION/TRAINING PROGRAM

## 2024-12-18 PROCEDURE — 80053 COMPREHEN METABOLIC PANEL: CPT

## 2024-12-18 PROCEDURE — 95251 CONT GLUC MNTR ANALYSIS I&R: CPT | Performed by: STUDENT IN AN ORGANIZED HEALTH CARE EDUCATION/TRAINING PROGRAM

## 2024-12-18 PROCEDURE — G2211 COMPLEX E/M VISIT ADD ON: HCPCS | Performed by: STUDENT IN AN ORGANIZED HEALTH CARE EDUCATION/TRAINING PROGRAM

## 2024-12-18 PROCEDURE — 3075F SYST BP GE 130 - 139MM HG: CPT | Performed by: STUDENT IN AN ORGANIZED HEALTH CARE EDUCATION/TRAINING PROGRAM

## 2024-12-18 PROCEDURE — 36415 COLL VENOUS BLD VENIPUNCTURE: CPT

## 2024-12-18 PROCEDURE — 85025 COMPLETE CBC W/AUTO DIFF WBC: CPT

## 2024-12-18 PROCEDURE — 1159F MED LIST DOCD IN RCRD: CPT | Performed by: STUDENT IN AN ORGANIZED HEALTH CARE EDUCATION/TRAINING PROGRAM

## 2024-12-18 PROCEDURE — 99214 OFFICE O/P EST MOD 30 MIN: CPT | Performed by: STUDENT IN AN ORGANIZED HEALTH CARE EDUCATION/TRAINING PROGRAM

## 2024-12-18 PROCEDURE — 84153 ASSAY OF PSA TOTAL: CPT

## 2024-12-18 PROCEDURE — 1160F RVW MEDS BY RX/DR IN RCRD: CPT | Performed by: STUDENT IN AN ORGANIZED HEALTH CARE EDUCATION/TRAINING PROGRAM

## 2024-12-18 RX ORDER — ZOLPIDEM TARTRATE 10 MG/1
10 TABLET ORAL NIGHTLY
Qty: 30 TABLET | Refills: 1 | Status: SHIPPED | OUTPATIENT
Start: 2025-01-06 | End: 2025-04-06

## 2024-12-18 RX ORDER — PANTOPRAZOLE SODIUM 40 MG/1
40 TABLET, DELAYED RELEASE ORAL
Qty: 30 TABLET | Refills: 3 | Status: SHIPPED | OUTPATIENT
Start: 2024-12-18

## 2024-12-18 RX ORDER — POTASSIUM CHLORIDE 1500 MG/1
20 TABLET, EXTENDED RELEASE ORAL
Qty: 30 TABLET | Refills: 3 | Status: SHIPPED | OUTPATIENT
Start: 2024-12-18

## 2024-12-18 RX ORDER — TRAZODONE HYDROCHLORIDE 50 MG/1
50 TABLET ORAL NIGHTLY
Qty: 30 TABLET | Refills: 0 | Status: SHIPPED | OUTPATIENT
Start: 2024-12-18

## 2024-12-18 ASSESSMENT — ENCOUNTER SYMPTOMS: PAIN: 1

## 2024-12-18 NOTE — PROGRESS NOTES
Subjective   Patient ID: Fredrick De La Paz is a 73 y.o. male who presents for follow up after his recent hospital admission with CHF exacerbation    AMW: 3/12/24; see chart     Assessment/Plan   Status Post Fall  L Buttock Hematoma - improving   -Had CT pelvis at [facility] was did not show STS. Currently there is palpable and visible STS over the left buttock  PLAN  -Saw gen surgery in Nov and then again in Dec. Symptoms improved.     R Foot Pain - resolved     Preventative Medicine  -UTD on vaccines. Flu shot UTD   -Had colonoscopy done recently per patient. Results to be brought  -PSA checked by previous PCP. States that it was normal but has a hx of BPH.   -PHQ2: 0  -Alcohol screening done    CHF/HFrEF (EF 25%)  CAD  -Echo on 3/27/24 shows EF 25%  -Was prescribed GDMT but was unable to afford Entresto.  Continues to take [id number] mg p.o. daily  -Has frequent exacerbations  -Had cardiac cath  in March 2024.  No intervention done at that    PLAN  -Currently on GDMT with farxiga, aldactone, metoprolol.   -Losartan stopped due to renal function. Needs to follow up with nephrology. Has appointment for Jan 2025   -Decreased torsemide on his own from 40mg to 20mg. Discussed risk of decreasing diuretics but patient would like to remain on 20mg PO daily.   -Currently euvolemic   -Refer to EP for AICD. Given number to EP at [facility]  during previous appointment, Still has not made an appointment.  - Low salt diet    Pulmonary Nodule  -Seen in CT chest from March 2024  -Recommend 1 year repeat CT (March 2025)    VALERIE  -Hx of previous remote stenting and balloon angioplasty  -Continue to monitor renal function  -Follows with Dr Casillas (vascular surgery)     T2DM  -Regarding his DM, he is on Basalglar 10 units in the morning, Humalog 15 units with each meal (as his insurance covers humalog but not novolog). Advised to increase basiglar to 12U qAM since A1c is increased to 8.0%. Last A1c was 7.6 August 2024. He is compliant  with home medications at this time. States that he eats a high carb diet. Advised patient to decrease carb intake  -Sugars are well controlled on CGM. Logs reviewed on patients device. Current 90 day average is 174.   -Continue follow up with Endocrine.     CKD-III  -Continue ARB  -Continue Farxiga  -Avoid nephrotoxic medications   -Check labs     Insomnia   -Continue ambien - refill given     HTN  -Repeat 134/82  -Advised medication compliance and diet control  -Continue norvasc, isordil, metoprolol, hydralazine   -Low salt diet     OARRS Report Reviewed and appropriate.  UDS reviewed.  I have personally reviewed the OARRS report.  I have considered the risks of abuse, dependence, addiction and diversion. I believe that it is clinically appropriate for this patient to be prescribed this medication based on documented diagnosis.     Controlled Substance Agreement:   I have printed this form and reviewed each line item with the patient and the patient has verbalized understanding.   Date of the last Controlled Substance Agreement: 2/27/24  Date of UDS To be obtained    I discussed patient's OARRS report as well as the potential concern for overdose on prescribed opioid, sleep aid, gabapentin/Lyrica, and/or benzodiazepines. I explained that Overdose Risk Scores >460, require a Narcan prescription and places the patient at risk for potential death.      Patient understands that the risk of death can occur when using opioid, benzodiazepines, gabapentin, Lyrica, sleep aids, and illegal drugs. The risk of death especially increases when using the above medications and or illegal drugs in combination. I have reviewed with the patient and the patient is in agreement with the terms of the  Controlled Substance Agreement.      At this point in time, patient is in compliance with the above, and has no signs of dependence or addiction to the above prescribed medications.          Toe Pain     Pain    Fall    73M presents for  follow up.     Needs refills on Ambien for insomnia. Previously had refills from last PCP which have since run out. Insomnia is well controlled on ambien. No side effects reported.     Discussed diabetic control.  Continuous glucose monitor data reviewed.  Average blood glucose over the past 90 days has been in the 170s.  Patient states that he still tries to eat a low-carb diet but is difficult since he does not cook much and has food brought to him by friends and family.    Advised patient that he still needs to follow-up with electrophysiology for AICD implant evaluation.  He states that he continues to forget and will go today to make appointment.    Of note in regards to his heart failure patient was discharged from the hospital on 40 mg of daily torsemide but he has decreased this to 20 mg daily.  Has not noticed any increased dyspnea, PND, orthopnea or leg swelling.  Discussed with patient importance of maintaining diuretic compliance and preventing future heart failure exacerbations.  He agrees but would like to continue with 20 mg daily dose.      Lastly he was diagnosed with recent gluteal hematoma October 2024.  I had referred him to general surgery and he has been following up with them.  States that the symptoms have improved significantly and he no longer has major pain.    Denies fevers, chills, weight loss, lightheadedness, dizziness, vision changes, sore throat, runny nose, CP, SOB, cough, palpitations, n/v/d, abd pain, black/bloody stools, arthralgias, mood disturbance, or new numbness/weakness/tingling in arms/legs/face.        Recently tripped and fell from standing position with head strike into wall. Went to ED and had negative imaging of pelvis and brain. Now he has a large hematoma of the L buttock. Still has pain in the left buttock.   All complaints and concerns addressed.    PMH: CKD, HFrEF, CAD, Insomnia, HTN, T2DM, VALERIE  Surgeries: PCI, renal artery stenting, PAD with stenting.     Social  "hx: Non smoker, denies Etoh drinking, no drugs, he med school lecturer, and is still working online part time    Visit Vitals  Ht 1.676 m (5' 6\")   Wt 69.4 kg (153 lb)   BMI 24.69 kg/m²   Smoking Status Never   BSA 1.8 m²     PHYSICAL EXAM     Physical Exam     Visit Vitals  Ht 1.676 m (5' 6\")   Wt 69.4 kg (153 lb)   BMI 24.69 kg/m²   Smoking Status Never   BSA 1.8 m²        General: NAD. NCAT. Aox3   HEENT: PERRLA. EOMI. MMM. Nares patent bl.  Cardiovascular: RRR. S1/S2 wnl. No JVD.   Respiratory: CTABL. No acute respiratory distress. No crackles  GI: Soft, NT abdomen.   MSK: ROM x 4. L buttock hematoma significantly decreased in size.   Extremities: No BLLE edema.   Skin: No rashes or bruises.   Neuro: Aox3. Cranial Nerves grossly intact. Motor/sensory wnl.   Psych: Mood wnl.       REVIEW OF SYSTEMS     ROS in HPI   No Known Allergies    Current Outpatient Medications   Medication Sig Dispense Refill    aspirin 81 mg EC tablet Take 1 tablet (81 mg) by mouth once daily.      atorvastatin (Lipitor) 40 mg tablet Take 1 tablet (40 mg) by mouth once daily. 90 tablet 3    clopidogrel (Plavix) 75 mg tablet Take 1 tablet (75 mg) by mouth once daily. 30 tablet 3    isosorbide dinitrate (Isordil) 10 mg tablet Take 1 tablet (10 mg) by mouth 2 times a day.      metoprolol succinate XL (Toprol-XL) 100 mg 24 hr tablet Take 1 tablet (100 mg) by mouth once daily.      pantoprazole (ProtoNix) 40 mg EC tablet Take 1 tablet (40 mg) by mouth once daily in the morning. Take before meals.      potassium chloride CR 20 mEq ER tablet Take 1 tablet (20 mEq) by mouth early in the morning.. 30 tablet 3    spironolactone (Aldactone) 25 mg tablet Take 1 tablet (25 mg) by mouth once daily.      torsemide (Demadex) 20 mg tablet Take 2 tablets (40 mg) by mouth once daily. 90 tablet 1    traZODone (Desyrel) 50 mg tablet Take 1 tablet (50 mg) by mouth once daily at bedtime.      allopurinol (Zyloprim) 100 mg tablet Take 1 tablet (100 mg) by mouth " once daily. (Patient taking differently: Take 1 tablet (100 mg) by mouth 2 times a day.) 90 tablet 3    amLODIPine (Norvasc) 10 mg tablet Take 1 tablet (10 mg) by mouth once daily. 30 tablet 3    Basaglar KwikPen U-100 Insulin 100 unit/mL (3 mL) pen Inject 12 Units under the skin once daily in the morning.      dapagliflozin propanediol (Farxiga) 10 mg Take 1 tablet (10 mg) by mouth once daily. 30 tablet 2    diclofenac sodium (Voltaren) 1 % gel Apply 4.5 inches (4 g) topically 4 times a day. 100 g 1    hydrALAZINE (Apresoline) 10 mg tablet Take 1 tablet (10 mg) by mouth 2 times a day.      hydrOXYzine HCL (Atarax) 50 mg tablet Take 1 tablet (50 mg) by mouth if needed.      insulin lispro (HumaLOG U-100 Insulin) 100 unit/mL injection Inject 15 Units under the skin 2 times daily (morning and late afternoon). Take as directed per insulin instructions.      multivitamin tablet Take 1 tablet by mouth once daily.      suvorexant (Belsomra) 10 mg tablet Take 1 tablet (10 mg) by mouth as needed at bedtime for sleep for up to 10 days. 10 tablet 0    zolpidem (Ambien) 10 mg tablet Take 1 tablet (10 mg) by mouth once daily at bedtime. Do not fill before November 11, 2024. 30 tablet 0     No current facility-administered medications for this visit.       Objective     Lab on 10/23/2024   Component Date Value Ref Range Status    Glucose 10/23/2024 196 (H)  74 - 99 mg/dL Final    Sodium 10/23/2024 137  136 - 145 mmol/L Final    Potassium 10/23/2024 4.9  3.5 - 5.3 mmol/L Final    Chloride 10/23/2024 101  98 - 107 mmol/L Final    Bicarbonate 10/23/2024 27  21 - 32 mmol/L Final    Anion Gap 10/23/2024 14  10 - 20 mmol/L Final    Urea Nitrogen 10/23/2024 31 (H)  6 - 23 mg/dL Final    Creatinine 10/23/2024 2.39 (H)  0.50 - 1.30 mg/dL Final    eGFR 10/23/2024 28 (L)  >60 mL/min/1.73m*2 Final    Calcium 10/23/2024 9.5  8.6 - 10.6 mg/dL Final    Phosphorus 10/23/2024 4.1  2.5 - 4.9 mg/dL Final    Albumin 10/23/2024 4.3  3.4 - 5.0 g/dL  Final   Orders Only on 09/13/2024   Component Date Value Ref Range Status    [facility] Bilirubin, Total 09/13/2024 0.50  0.30 - 1.20 mg/dL Final    [facility] Chloride 09/13/2024 100  98 - 107 mmol/L Final    [facility] A/G Ratio 09/13/2024 1.2   Final    [facility] Na 09/13/2024 136  135 - 145 mmol/L Final    [facility] BUN/Creat Ratio 09/13/2024 15.5   Final    [facility] GPT 09/13/2024 35  10 - 49 unit/L Final    [facility] Creatinine 09/13/2024 3.1 (H)  0.6 - 1.1 mg/dL Final    [facility] Alk Phos 09/13/2024 79  45 - 117 unit/L Final    [facility] Total Protein 09/13/2024 6.9  5.7 - 8.2 g/dL Final    [facility] CO2, venous 09/13/2024 28.0  20.0 - 31.0 mmol/L Final    [facility] Glomerular Filtration R* 09/13/2024 20  mL/min/1.73m? Final    [facility] Calculated Osmolality 09/13/2024 294  275 - 295 mOsm/kg Final    [facility] K 09/13/2024 4.4  3.5 - 5.1 mmol/L Final    [facility] Globulin 09/13/2024 3.2  g/dL Final    [facility] BUN 09/13/2024 48 (H)  9 - 23 mg/dL Final    [facility] Calcium 09/13/2024 9.2  8.7 - 10.4 mg/dL Final    [facility] GOT 09/13/2024 45 (H)  15 - 37 unit/L Final    [facility] ALB 09/13/2024 3.7  3.4 - 5.0 g/dL Final    [facility] Glucose 09/13/2024 260 (H)  74 - 106 mg/dL Final    [facility] GFR AA 09/13/2024 24   Final       Radiology: Reviewed imaging in powerchart.  No results found.    No family history on file.  Social History     Socioeconomic History    Marital status:    Tobacco Use    Smoking status: Never    Smokeless tobacco: Never   Substance and Sexual Activity    Alcohol use: Not Currently     Social Drivers of Health     Financial Resource Strain: Low Risk  (3/27/2024)    Overall Financial Resource Strain (CARDIA)     Difficulty of Paying Living Expenses: Not hard at all   Food Insecurity: No Food Insecurity (12/4/2023)    [facility]    Hunger Vital Sign     Worried About Running Out of Food in the Last Year: Never true     Ran Out of  Food in the Last Year: Never true   Transportation Needs: No Transportation Needs (3/27/2024)    PRAPARE - Transportation     Lack of Transportation (Medical): No     Lack of Transportation (Non-Medical): No   Physical Activity: Not on File (2/26/2021)    Received from [facility]    Physical Activity     Physical Activity: 0   Stress: Not on File (2/26/2021)    Received from [facility]    Stress     Stress: 0   Social Connections: Not on File (2/26/2021)    Received from [facility]    Social Connections     Social Connections and Isolation: 0   Intimate Partner Violence: Not At Risk (12/4/2023)    Received from [facility]    Humiliation, Afraid, Rape, and Kick questionnaire     Fear of Current or Ex-Partner: No     Emotionally Abused: No     Physically Abused: No     Sexually Abused: No   Housing Stability: Low Risk  (3/27/2024)    Housing Stability Vital Sign     Unable to Pay for Housing in the Last Year: No     Number of Places Lived in the Last Year: 1     Unstable Housing in the Last Year: No     Past Medical History:   Diagnosis Date    Acute cystitis with hematuria 12/31/2016    Acute cystitis with hematuria    Personal history of other diseases of the circulatory system     History of hypertension    Personal history of other diseases of the circulatory system     History of cardiac disorder    Personal history of other endocrine, nutritional and metabolic disease     History of diabetic neuropathy    Personal history of other endocrine, nutritional and metabolic disease     History of hyperlipidemia    Personal history of other endocrine, nutritional and metabolic disease     History of type 2 diabetes mellitus    Personal history of other specified conditions     History of insomnia     Past Surgical History:   Procedure Laterality Date    OTHER SURGICAL HISTORY  09/17/2019    Bypass    OTHER SURGICAL HISTORY  09/17/2019    Arterial stent placement       Charting was completed using  voice recognition technology and may include unintended errors.

## 2024-12-19 ENCOUNTER — TELEPHONE (OUTPATIENT)
Dept: PRIMARY CARE | Facility: CLINIC | Age: 74
End: 2024-12-19
Payer: MEDICARE

## 2024-12-19 NOTE — TELEPHONE ENCOUNTER
----- Message from Nydia De La Paz sent at 12/19/2024 11:16 AM EST -----  Labs stable and consistent with current medical hx  Continue follow up with me, nephrology and cardiology

## 2025-01-07 ENCOUNTER — PATIENT OUTREACH (OUTPATIENT)
Dept: PRIMARY CARE | Facility: CLINIC | Age: 75
End: 2025-01-07
Payer: MEDICARE

## 2025-02-19 LAB
NON-UH HIE ALB: 3.5 G/DL (ref 3.4–5)
NON-UH HIE APPEARANCE, U: ABNORMAL
NON-UH HIE BASO COUNT: 0.03 X1000 (ref 0–0.2)
NON-UH HIE BASOS %: 0.5 %
NON-UH HIE BILIRUBIN, U: ABNORMAL
NON-UH HIE BLOOD, U: ABNORMAL
NON-UH HIE BUN/CREAT RATIO: 16.8
NON-UH HIE BUN/CREAT RATIO: 16.8
NON-UH HIE BUN: 47 MG/DL (ref 9–23)
NON-UH HIE BUN: 47 MG/DL (ref 9–23)
NON-UH HIE CALCIUM: 9.2 MG/DL (ref 8.7–10.4)
NON-UH HIE CALCIUM: 9.2 MG/DL (ref 8.7–10.4)
NON-UH HIE CALCULATED OSMOLALITY: 300 MOSM/KG (ref 275–295)
NON-UH HIE CALCULATED OSMOLALITY: 300 MOSM/KG (ref 275–295)
NON-UH HIE CHLORIDE: 107 MMOL/L (ref 98–107)
NON-UH HIE CHLORIDE: 107 MMOL/L (ref 98–107)
NON-UH HIE CO2, VENOUS: 25 MMOL/L (ref 20–31)
NON-UH HIE CO2, VENOUS: 25 MMOL/L (ref 20–31)
NON-UH HIE COLOR, U: ABNORMAL
NON-UH HIE CORRECTED CALCIUM: 9.6 MG/DL (ref 8.5–10.5)
NON-UH HIE CREATININE, URINE MG/DL: 36.9 MG/DL
NON-UH HIE CREATININE: 2.8 MG/DL (ref 0.6–1.1)
NON-UH HIE CREATININE: 2.8 MG/DL (ref 0.6–1.1)
NON-UH HIE DIFF?: ABNORMAL
NON-UH HIE EOS COUNT: 0.2 X1000 (ref 0–0.5)
NON-UH HIE EOSIN %: 3.3 %
NON-UH HIE GFR AA: 27
NON-UH HIE GFR AA: 27
NON-UH HIE GLOMERULAR FILTRATION RATE: 22 ML/MIN/1.73M?
NON-UH HIE GLOMERULAR FILTRATION RATE: 22 ML/MIN/1.73M?
NON-UH HIE GLUCOSE QUAL, U: ABNORMAL
NON-UH HIE GLUCOSE: 216 MG/DL (ref 74–106)
NON-UH HIE GLUCOSE: 217 MG/DL (ref 74–106)
NON-UH HIE HCT: 38.1 % (ref 41–52)
NON-UH HIE HGB: 12.5 G/DL (ref 13.5–17.5)
NON-UH HIE I-PTH: 206 PG/ML (ref 18.4–80.1)
NON-UH HIE INSTR WBC: 5.9
NON-UH HIE K: 4.8 MMOL/L (ref 3.5–5.1)
NON-UH HIE K: 4.8 MMOL/L (ref 3.5–5.1)
NON-UH HIE KETONES, U: ABNORMAL
NON-UH HIE LEUKOCYTE ESTERASE, U: ABNORMAL
NON-UH HIE LYMPH %: 28.5 %
NON-UH HIE LYMPH COUNT: 1.68 X1000 (ref 1.2–4.8)
NON-UH HIE MAGNESIUM: 1.9 MG/DL (ref 1.6–2.6)
NON-UH HIE MCH: 30.1 PG (ref 27–34)
NON-UH HIE MCHC: 32.8 G/DL (ref 32–37)
NON-UH HIE MCV: 91.8 FL (ref 80–100)
NON-UH HIE MICROALBUMIN, URINE MG/L: 444 MG/L
NON-UH HIE MICROALBUMIN/CREATININE RATIO: 1203 MG MALB/GM CREAT (ref 0–30)
NON-UH HIE MONO %: 8.4 %
NON-UH HIE MONO COUNT: 0.5 X1000 (ref 0.1–1)
NON-UH HIE MPV: 8.9 FL (ref 7.4–10.4)
NON-UH HIE NA: 141 MMOL/L (ref 135–145)
NON-UH HIE NA: 141 MMOL/L (ref 135–145)
NON-UH HIE NEUTROPHIL %: 59.3 %
NON-UH HIE NEUTROPHIL COUNT (ANC): 3.5 X1000 (ref 1.4–8.8)
NON-UH HIE NITRITE, U: ABNORMAL
NON-UH HIE NUCLEATED RBC: 0 /100WBC
NON-UH HIE PH, U: 6 (ref 4.5–8)
NON-UH HIE PHOSPHORUS: 3.9 MG/DL (ref 2–5.1)
NON-UH HIE PLATELET: 143 X10 (ref 150–450)
NON-UH HIE PROTEIN, U: ABNORMAL
NON-UH HIE RBC/HPF, U: <1 #/HPF (ref 0–3)
NON-UH HIE RBC: 4.15 X10 (ref 4.7–6.1)
NON-UH HIE RDW: 15.7 % (ref 11.5–14.5)
NON-UH HIE SPECIFIC GRAVITY, U: 1.01 (ref 1–1.03)
NON-UH HIE U MICRO: ABNORMAL
NON-UH HIE URIC ACID: 7.2 MG/DL (ref 3.7–9.2)
NON-UH HIE UROBILINOGEN QUAL, U: ABNORMAL
NON-UH HIE VIT D 25: 9 NG/ML
NON-UH HIE WBC/HPF, U: <1 #/HPF (ref 0–5)
NON-UH HIE WBC: 5.9 X10 (ref 4.5–11)

## 2025-03-05 LAB
NON-UH HIE ALB: 3.7 G/DL (ref 3.4–5)
NON-UH HIE BUN/CREAT RATIO: 19
NON-UH HIE BUN: 57 MG/DL (ref 9–23)
NON-UH HIE CALCIUM: 9.3 MG/DL (ref 8.7–10.4)
NON-UH HIE CALCULATED OSMOLALITY: 295 MOSM/KG (ref 275–295)
NON-UH HIE CHLORIDE: 103 MMOL/L (ref 98–107)
NON-UH HIE CO2, VENOUS: 24 MMOL/L (ref 20–31)
NON-UH HIE CORRECTED CALCIUM: 9.6 MG/DL (ref 8.5–10.5)
NON-UH HIE CREATININE: 3 MG/DL (ref 0.6–1.1)
NON-UH HIE GFR AA: 25
NON-UH HIE GLOMERULAR FILTRATION RATE: 20 ML/MIN/1.73M?
NON-UH HIE GLUCOSE: 191 MG/DL (ref 74–106)
NON-UH HIE K: 5.6 MMOL/L (ref 3.5–5.1)
NON-UH HIE NA: 137 MMOL/L (ref 135–145)
NON-UH HIE PHOSPHORUS: 5.2 MG/DL (ref 2–5.1)

## 2025-03-19 ENCOUNTER — APPOINTMENT (OUTPATIENT)
Dept: PRIMARY CARE | Facility: CLINIC | Age: 75
End: 2025-03-19
Payer: MEDICARE

## 2025-03-19 VITALS
SYSTOLIC BLOOD PRESSURE: 136 MMHG | DIASTOLIC BLOOD PRESSURE: 88 MMHG | HEART RATE: 76 BPM | HEIGHT: 66 IN | WEIGHT: 158 LBS | OXYGEN SATURATION: 99 % | BODY MASS INDEX: 25.39 KG/M2

## 2025-03-19 DIAGNOSIS — I50.20 HFREF (HEART FAILURE WITH REDUCED EJECTION FRACTION): ICD-10-CM

## 2025-03-19 DIAGNOSIS — Z79.4 TYPE 2 DIABETES MELLITUS WITH STAGE 3B CHRONIC KIDNEY DISEASE, WITH LONG-TERM CURRENT USE OF INSULIN (MULTI): ICD-10-CM

## 2025-03-19 DIAGNOSIS — I25.810 ATHEROSCLEROSIS OF CORONARY ARTERY BYPASS GRAFT OF NATIVE HEART WITHOUT ANGINA PECTORIS: ICD-10-CM

## 2025-03-19 DIAGNOSIS — E11.22 TYPE 2 DIABETES MELLITUS WITH STAGE 3B CHRONIC KIDNEY DISEASE, WITH LONG-TERM CURRENT USE OF INSULIN (MULTI): ICD-10-CM

## 2025-03-19 DIAGNOSIS — Z00.00 MEDICARE ANNUAL WELLNESS VISIT, SUBSEQUENT: Primary | ICD-10-CM

## 2025-03-19 DIAGNOSIS — E87.5 HYPERKALEMIA: ICD-10-CM

## 2025-03-19 DIAGNOSIS — Z79.899 MEDICATION MANAGEMENT: ICD-10-CM

## 2025-03-19 DIAGNOSIS — R91.1 LUNG NODULE: ICD-10-CM

## 2025-03-19 DIAGNOSIS — I10 HYPERTENSION, UNSPECIFIED TYPE: ICD-10-CM

## 2025-03-19 DIAGNOSIS — N18.32 TYPE 2 DIABETES MELLITUS WITH STAGE 3B CHRONIC KIDNEY DISEASE, WITH LONG-TERM CURRENT USE OF INSULIN (MULTI): ICD-10-CM

## 2025-03-19 LAB — POC HEMOGLOBIN A1C: 8.3 % (ref 4.2–6.5)

## 2025-03-19 PROCEDURE — 1170F FXNL STATUS ASSESSED: CPT | Performed by: STUDENT IN AN ORGANIZED HEALTH CARE EDUCATION/TRAINING PROGRAM

## 2025-03-19 PROCEDURE — 99214 OFFICE O/P EST MOD 30 MIN: CPT | Performed by: STUDENT IN AN ORGANIZED HEALTH CARE EDUCATION/TRAINING PROGRAM

## 2025-03-19 PROCEDURE — 1159F MED LIST DOCD IN RCRD: CPT | Performed by: STUDENT IN AN ORGANIZED HEALTH CARE EDUCATION/TRAINING PROGRAM

## 2025-03-19 PROCEDURE — 83036 HEMOGLOBIN GLYCOSYLATED A1C: CPT | Performed by: STUDENT IN AN ORGANIZED HEALTH CARE EDUCATION/TRAINING PROGRAM

## 2025-03-19 PROCEDURE — G2211 COMPLEX E/M VISIT ADD ON: HCPCS | Performed by: STUDENT IN AN ORGANIZED HEALTH CARE EDUCATION/TRAINING PROGRAM

## 2025-03-19 PROCEDURE — 99497 ADVNCD CARE PLAN 30 MIN: CPT | Performed by: STUDENT IN AN ORGANIZED HEALTH CARE EDUCATION/TRAINING PROGRAM

## 2025-03-19 PROCEDURE — 1123F ACP DISCUSS/DSCN MKR DOCD: CPT | Performed by: STUDENT IN AN ORGANIZED HEALTH CARE EDUCATION/TRAINING PROGRAM

## 2025-03-19 PROCEDURE — 3079F DIAST BP 80-89 MM HG: CPT | Performed by: STUDENT IN AN ORGANIZED HEALTH CARE EDUCATION/TRAINING PROGRAM

## 2025-03-19 PROCEDURE — 3075F SYST BP GE 130 - 139MM HG: CPT | Performed by: STUDENT IN AN ORGANIZED HEALTH CARE EDUCATION/TRAINING PROGRAM

## 2025-03-19 PROCEDURE — 1160F RVW MEDS BY RX/DR IN RCRD: CPT | Performed by: STUDENT IN AN ORGANIZED HEALTH CARE EDUCATION/TRAINING PROGRAM

## 2025-03-19 PROCEDURE — 1036F TOBACCO NON-USER: CPT | Performed by: STUDENT IN AN ORGANIZED HEALTH CARE EDUCATION/TRAINING PROGRAM

## 2025-03-19 PROCEDURE — G0439 PPPS, SUBSEQ VISIT: HCPCS | Performed by: STUDENT IN AN ORGANIZED HEALTH CARE EDUCATION/TRAINING PROGRAM

## 2025-03-19 RX ORDER — LOSARTAN POTASSIUM 25 MG/1
25 TABLET ORAL DAILY
COMMUNITY
Start: 2024-02-09

## 2025-03-19 ASSESSMENT — ACTIVITIES OF DAILY LIVING (ADL)
GROCERY_SHOPPING: INDEPENDENT
TAKING_MEDICATION: INDEPENDENT
BATHING: INDEPENDENT
DOING_HOUSEWORK: INDEPENDENT
MANAGING_FINANCES: INDEPENDENT
DRESSING: INDEPENDENT

## 2025-03-19 ASSESSMENT — PATIENT HEALTH QUESTIONNAIRE - PHQ9
2. FEELING DOWN, DEPRESSED OR HOPELESS: NOT AT ALL
SUM OF ALL RESPONSES TO PHQ9 QUESTIONS 1 AND 2: 0
1. LITTLE INTEREST OR PLEASURE IN DOING THINGS: NOT AT ALL

## 2025-03-19 ASSESSMENT — ENCOUNTER SYMPTOMS
PAIN: 1
OCCASIONAL FEELINGS OF UNSTEADINESS: 0
DEPRESSION: 0
LOSS OF SENSATION IN FEET: 0

## 2025-03-19 NOTE — PROGRESS NOTES
Subjective   Patient ID: Fredrick De La Paz is a 74 y.o. male who presents for follow up after his recent hospital admission with CHF exacerbation    AMW: 3/19/25; see chart     Assessment/Plan   Preventative Medicine  -UTD on vaccines. Flu shot UTD   -Had colonoscopy done recently per patient. Results to be brought  -PSA checked by previous PCP. States that it was normal but has a hx of BPH.   -PHQ2: 0  -Alcohol screening done  -ACP discussed (>16 minutes): Ex-Wife Corinne is HCA. Code status discussed would like to remain full code.   -Labs reviewed from [facility] nephrology. Had mild hyperkalemia and potassium was mildly elevated at 5.8.   -Check lipids, TSH, and BMP for potassium     L Buttock Hematoma - resolved   -Saw gen surg (Dr Nicholas)     R Foot Pain - resolved     Hyperkalemia  -Seen on labs from Nephrology earlier this month  -Potassium supplementation was stopped  PLAN  -Check BMP     CHF/HFrEF (EF 25%)  CAD  -Echo on 3/27/24 shows EF 25%  -Was prescribed GDMT but was unable to afford Entresto.  Continues to take [id number] mg p.o. daily  -Has frequent exacerbations  -Had cardiac cath  in March 2024.  No intervention done at that  PLAN  -Losartan continued by nephrology   -continue isosorbide dinitrate BID, hydralazine 10mg BID, aldactone 25mg once daily, metoprolol, torsemide.   -Referred to EP for AICD. Given number to EP at [facility]  during previous appointment, Still has not made an appointment.  - Low salt diet    Pulmonary Nodule  -Seen in CT chest from March 2024  -Repeat this year (order given)     VALERIE  -Hx of previous remote stenting and balloon angioplasty  -Continue to monitor renal function  -Follows with Dr Casillas (vascular surgery)     T2DM  -A1c 8.3 (March 2025)  -Current insulin regimen is Basaglar 15U and sliding scale humalog.   -Follows with endocrine. Unknown when next appointment is  PLAN  -Increase basaglar to 17U and change to qPM. Seems as though sugars are higher overnight.    -Continue humalog SSI  -Low carb diet advised. Currently eats rice/lentils for breakfast/lunch. Eats traditional [place] food for dinner consisting of bread and vegetables.  -Has CGM. Log reviewed. Seems like sugars are staying elevated into the 200s overnight. Will change basaglar to at bedtime dosiong   -Continue follow up with Endocrine (Dr Garvey)     CKD-III  -Continue ARB  -Continue Farxiga (samples given today)   -Avoid nephrotoxic medications     Insomnia   -Continue ambien - refill given  -OARRS reviewed.     HTN  -Repeat 136/88  -Advised medication compliance and diet control  -Continue losartan, isordil, metoprolol, hydralazine   -Low salt diet     OARRS Report Reviewed and appropriate.  UDS reviewed.  I have personally reviewed the OARRS report.  I have considered the risks of abuse, dependence, addiction and diversion. I believe that it is clinically appropriate for this patient to be prescribed this medication based on documented diagnosis.     Controlled Substance Agreement:   I have printed this form and reviewed each line item with the patient and the patient has verbalized understanding.   Date of the last Controlled Substance Agreement: 2/27/24  Date of UDS To be obtained    I discussed patient's OARRS report as well as the potential concern for overdose on prescribed opioid, sleep aid, gabapentin/Lyrica, and/or benzodiazepines. I explained that Overdose Risk Scores >460, require a Narcan prescription and places the patient at risk for potential death.      Patient understands that the risk of death can occur when using opioid, benzodiazepines, gabapentin, Lyrica, sleep aids, and illegal drugs. The risk of death especially increases when using the above medications and or illegal drugs in combination. I have reviewed with the patient and the patient is in agreement with the terms of the  Controlled Substance Agreement.      At this point in time, patient is in compliance with the above, and has  "no signs of dependence or addiction to the above prescribed medications.          Toe Pain     Pain    Fall    74-year-old male presents for Medicare wellness, follow-up    Medicare wellness done, please see chart    Recently saw nephrology and had amlodipine stopped and losartan started.  Was also given Rx for Farxiga but patient could not afford it.  Currently has some samples of Farxiga which he has been taking.  He has filled out a statement of need to give to company that makes Farxiga and is currently pending to see if he is eligible for reduced pricing.  Will give him samples of Jardiance at this time.    Saw cardiology last month.    Continue to follow-up with endocrine.  In regards to his diabetes A1c is elevated.  Patient does have higher blood glucose levels at night.  Discussed changing basal insulin to nightly requesting refills on Ambien.  1 prescription typically lasts about 3 months.  And following up with endocrine.    Advised patient that he still needs to follow-up with electrophysiology for AICD implant evaluation.  He states that he continues to forget and will go today to make appointment.    No recent hospitalizations or heart failure exacerbations reported.  Denies any shortness of breath, dyspnea exertion, paroxysmal nocturnal dyspnea, leg swelling.    Denies fevers, chills, weight loss, lightheadedness, dizziness, vision changes, sore throat, runny nose, CP, SOB, cough, palpitations, n/v/d, abd pain, black/bloody stools, arthralgias, mood disturbance, or new numbness/weakness/tingling in arms/legs/face.      PMH: CKD, HFrEF, CAD, Insomnia, HTN, T2DM, VALERIE  Surgeries: PCI, renal artery stenting, PAD with stenting.     Social hx: Non smoker, denies Etoh drinking, no drugs, he med school lecturer, and is still working online part time    Visit Vitals  /88   Pulse 76   Ht 1.676 m (5' 6\")   Wt 71.7 kg (158 lb)   SpO2 99%   BMI 25.50 kg/m²   Smoking Status Never   BSA 1.83 m²     PHYSICAL " "EXAM     Physical Exam     Visit Vitals  /88   Pulse 76   Ht 1.676 m (5' 6\")   Wt 71.7 kg (158 lb)   SpO2 99%   BMI 25.50 kg/m²   Smoking Status Never   BSA 1.83 m²        General: NAD. NCAT. Aox3   HEENT: PERRLA. EOMI. MMM. Nares patent bl.  Cardiovascular: RRR. S1/S2 wnl. No JVD.   Respiratory: CTABL. No acute respiratory distress. No crackles  GI: Soft, NT abdomen.   MSK: ROM x 4. CTLS non-tender  Extremities: No BLLE edema.   Skin: No rashes or bruises.   Neuro: Aox3. Cranial Nerves grossly intact. Motor/sensory wnl.   Psych: Mood wnl.       REVIEW OF SYSTEMS     ROS in HPI   No Known Allergies    Current Outpatient Medications   Medication Sig Dispense Refill    allopurinol (Zyloprim) 100 mg tablet Take 1 tablet (100 mg) by mouth once daily. (Patient taking differently: Take 1 tablet (100 mg) by mouth 2 times a day.) 90 tablet 3    amLODIPine (Norvasc) 10 mg tablet Take 1 tablet (10 mg) by mouth once daily. 30 tablet 3    aspirin 81 mg EC tablet Take 1 tablet (81 mg) by mouth once daily.      atorvastatin (Lipitor) 40 mg tablet Take 1 tablet (40 mg) by mouth once daily. 90 tablet 3    Basaglar KwikPen U-100 Insulin 100 unit/mL (3 mL) pen Inject 12 Units under the skin once daily in the morning.      clopidogrel (Plavix) 75 mg tablet Take 1 tablet (75 mg) by mouth once daily. 30 tablet 3    diclofenac sodium (Voltaren) 1 % gel Apply 4.5 inches (4 g) topically 4 times a day. 100 g 1    hydrALAZINE (Apresoline) 10 mg tablet Take 1 tablet (10 mg) by mouth 2 times a day.      hydrOXYzine HCL (Atarax) 50 mg tablet Take 1 tablet (50 mg) by mouth if needed.      insulin lispro (HumaLOG U-100 Insulin) 100 unit/mL injection Inject 15 Units under the skin 2 times daily (morning and late afternoon). Take as directed per insulin instructions.      isosorbide dinitrate (Isordil) 10 mg tablet Take 1 tablet (10 mg) by mouth 2 times a day.      losartan (Cozaar) 25 mg tablet Take 1 tablet (25 mg) by mouth once daily.      " metoprolol succinate XL (Toprol-XL) 100 mg 24 hr tablet Take 1 tablet (100 mg) by mouth once daily.      multivitamin tablet Take 1 tablet by mouth once daily.      pantoprazole (ProtoNix) 40 mg EC tablet Take 1 tablet (40 mg) by mouth once daily in the morning. Take before meals. 30 tablet 3    spironolactone (Aldactone) 25 mg tablet Take 1 tablet (25 mg) by mouth once daily.      torsemide (Demadex) 20 mg tablet Take 2 tablets (40 mg) by mouth once daily. 90 tablet 1    traZODone (Desyrel) 50 mg tablet Take 1 tablet (50 mg) by mouth once daily at bedtime. 30 tablet 0    zolpidem (Ambien) 10 mg tablet Take 1 tablet (10 mg) by mouth once daily at bedtime. Do not fill before January 6, 2025. 30 tablet 1    dapagliflozin propanediol (Farxiga) 10 mg Take 1 tablet (10 mg) by mouth once daily. (Patient not taking: Reported on 3/19/2025) 30 tablet 2    potassium chloride CR 20 mEq ER tablet Take 1 tablet (20 mEq) by mouth early in the morning.. (Patient not taking: Reported on 3/19/2025) 30 tablet 3    suvorexant (Belsomra) 10 mg tablet Take 1 tablet (10 mg) by mouth as needed at bedtime for sleep for up to 10 days. 10 tablet 0     No current facility-administered medications for this visit.       Objective     Office Visit on 03/19/2025   Component Date Value Ref Range Status    POC HEMOGLOBIN A1c 03/19/2025 8.3 (A)  4.2 - 6.5 % Final   Orders Only on 03/05/2025   Component Date Value Ref Range Status    [facility] GFR AA 03/05/2025 25   Final    [facility] BUN 03/05/2025 57 (H)  9 - 23 mg/dL Final    [facility] Calcium 03/05/2025 9.3  8.7 - 10.4 mg/dL Final    [facility] Corrected Calcium 03/05/2025 9.6  8.5 - 10.5 mg/dL Final    [facility] ALB 03/05/2025 3.7  3.4 - 5.0 g/dL Final    [facility] Glucose 03/05/2025 191 (H)  74 - 106 mg/dL Final    [facility] Phosphorus 03/05/2025 5.2 (H)  2.0 - 5.1 mg/dL Final    [facility] Chloride 03/05/2025 103  98 - 107 mmol/L Final    [facility] Na 03/05/2025 137  135 - 145 mmol/L  Final    [facility] BUN/Creat Ratio 03/05/2025 19.0   Final    [facility] Glomerular Filtration R* 03/05/2025 20  mL/min/1.73m? Final    [facility] Creatinine 03/05/2025 3.0 (H)  0.6 - 1.1 mg/dL Final    [facility] CO2, venous 03/05/2025 24.0  20.0 - 31.0 mmol/L Final    [facility] Calculated Osmolality 03/05/2025 295  275 - 295 mOsm/kg Final    [facility] K 03/05/2025 5.6 (H)  3.5 - 5.1 mmol/L Final   Orders Only on 02/19/2025   Component Date Value Ref Range Status    [facility] Instr WBC 02/19/2025 5.9   Final    [facility] MCHC 02/19/2025 32.8  32.0 - 37.0 g/dL Final    [facility] MCV 02/19/2025 91.8  80.0 - 100.0 fL Final    [facility] MPV 02/19/2025 8.9  7.4 - 10.4 fL Final    [facility] HGB 02/19/2025 12.5 (L)  13.5 - 17.5 g/dL Final    [facility] WBC 02/19/2025 5.9  4.5 - 11.0 x10 Final    [facility] RDW 02/19/2025 15.7 (H)  11.5 - 14.5 % Final    [facility] MCH 02/19/2025 30.1  27.0 - 34.0 pg Final    [facility] Nucleated RBC 02/19/2025 0  /100WBC Final    [facility] HCT 02/19/2025 38.1 (L)  41.0 - 52.0 % Final    [facility] RBC 02/19/2025 4.15 (L)  4.70 - 6.10 x10 Final    [facility] Platelet 02/19/2025 143 (L)  150 - 450 x10 Final    [facility] DIFF? 02/19/2025 No^NO   Final    [facility] Eos Count 02/19/2025 0.20  0.00 - 0.50 x1000 Final    [facility] Lymph % 02/19/2025 28.5  % Final    [facility] Lymph Count 02/19/2025 1.68  1.20 - 4.80 x1000 Final    [facility] Basos % 02/19/2025 0.5  % Final    [facility] Baso Count 02/19/2025 0.03  0.00 - 0.20 x1000 Final    [facility] Mono Count 02/19/2025 0.50  0.10 - 1.00 x1000 Final    [facility] Mono % 02/19/2025 8.4  % Final    [facility] Neutrophil % 02/19/2025 59.3  % Final    [facility] Neutrophil Count (ANC) 02/19/2025 3.50  1.40 - 8.80 x1000 Final    [facility] Eosin % 02/19/2025 3.3  % Final    [facility] Leukocyte Esterase, U 02/19/2025 Negative^NEGATIVE  Negative Final    [facility] Ketones, U 02/19/2025 Negative^NEGATIVE  Negative Final    [facility]  Glucose Qual, U 02/19/2025 70 mg/dl^70 (A)  Negative Final    [facility] Urobilinogen Qual, U 02/19/2025 < 2 mg/dl^Normal  < 2 mg/dl Final    [facility] Color, U 02/19/2025 Colorless^Colorless  Yellow Final    [facility] pH, U 02/19/2025 6.0  4.5 - 8.0 Final    [facility] RBC/HPF, U 02/19/2025 <1  0 - 3 #/HPF Final    [facility] Specific Gravity, U 02/19/2025 1.010  1.001 - 1.035 Final    [facility] Bilirubin, U 02/19/2025 Negative^NEGATIVE  Negative Final    [facility] Nitrite, U 02/19/2025 Negative^NEGATIVE  Negative Final    [facility] Appearance, U 02/19/2025 Clear^Clear  Clear Final    [facility] Protein, U 02/19/2025 50 mg/dl^50 (A)  Negative Final    [facility] WBC/HPF, U 02/19/2025 <1  0 - 5 #/HPF Final    [facility] Blood, U 02/19/2025 Negative^NEGATIVE  Negative Final    [facility] U MICRO 02/19/2025 Indicated^IND   Final    [facility] GFR AA 02/19/2025 27   Final    [facility] CO2, venous 02/19/2025 25.0  20.0 - 31.0 mmol/L Final    [facility] K 02/19/2025 4.8  3.5 - 5.1 mmol/L Final    [facility] Calculated Osmolality 02/19/2025 300 (H)  275 - 295 mOsm/kg Final    [facility] Calcium 02/19/2025 9.2  8.7 - 10.4 mg/dL Final    [facility] BUN 02/19/2025 47 (H)  9 - 23 mg/dL Final    [facility] Glucose 02/19/2025 216 (H)  74 - 106 mg/dL Final    [facility] Chloride 02/19/2025 107  98 - 107 mmol/L Final    [facility] Glomerular Filtration R* 02/19/2025 22  mL/min/1.73m? Final    [facility] Na 02/19/2025 141  135 - 145 mmol/L Final    [facility] BUN/Creat Ratio 02/19/2025 16.8   Final    [facility] Creatinine 02/19/2025 2.8 (H)  0.6 - 1.1 mg/dL Final    [facility] I-PTH 02/19/2025 206.0 (H)  18.4 - 80.1 pg/mL Final    [facility] Magnesium 02/19/2025 1.9  1.6 - 2.6 mg/dL Final    [facility] Uric Acid 02/19/2025 7.2  3.7 - 9.2 mg/dL Final    [facility] Corrected Calcium 02/19/2025 9.6  8.5 - 10.5 mg/dL Final    [facility] CO2, venous 02/19/2025 25.0  20.0 - 31.0 mmol/L Final    [facility] K 02/19/2025 4.8  3.5 -  5.1 mmol/L Final    [facility] Calculated Osmolality 02/19/2025 300 (H)  275 - 295 mOsm/kg Final    [facility] Calcium 02/19/2025 9.2  8.7 - 10.4 mg/dL Final    [facility] BUN 02/19/2025 47 (H)  9 - 23 mg/dL Final    [facility] ALB 02/19/2025 3.5  3.4 - 5.0 g/dL Final    [facility] Glomerular Filtration R* 02/19/2025 22  mL/min/1.73m? Final    [facility] Glucose 02/19/2025 217 (H)  74 - 106 mg/dL Final    [facility] Chloride 02/19/2025 107  98 - 107 mmol/L Final    [facility] Phosphorus 02/19/2025 3.9  2.0 - 5.1 mg/dL Final    [facility] BUN/Creat Ratio 02/19/2025 16.8   Final    [facility] Na 02/19/2025 141  135 - 145 mmol/L Final    [facility] GFR AA 02/19/2025 27   Final    [facility] Creatinine 02/19/2025 2.8 (H)  0.6 - 1.1 mg/dL Final    [facility] Vit D 25 02/19/2025 9  ng/mL Final    [facility] Microalbumin/Creatinine* 02/19/2025 1,203 (H)  0 - 30 mg MALB/gm CREAT Final    [facility] Creatinine, Urine mg/dl 02/19/2025 36.9  mg/dL Final    [facility] Microalbumin, Urine mg/L 02/19/2025 444.0  mg/L Final   Lab on 12/18/2024   Component Date Value Ref Range Status    Glucose 12/18/2024 202 (H)  74 - 99 mg/dL Final    Sodium 12/18/2024 137  136 - 145 mmol/L Final    Potassium 12/18/2024 5.1  3.5 - 5.3 mmol/L Final    Chloride 12/18/2024 102  98 - 107 mmol/L Final    Bicarbonate 12/18/2024 25  21 - 32 mmol/L Final    Anion Gap 12/18/2024 15  10 - 20 mmol/L Final    Urea Nitrogen 12/18/2024 50 (H)  6 - 23 mg/dL Final    Creatinine 12/18/2024 2.50 (H)  0.50 - 1.30 mg/dL Final    eGFR 12/18/2024 26 (L)  >60 mL/min/1.73m*2 Final    Calcium 12/18/2024 9.4  8.6 - 10.6 mg/dL Final    Albumin 12/18/2024 4.3  3.4 - 5.0 g/dL Final    Alkaline Phosphatase 12/18/2024 60  33 - 136 U/L Final    Total Protein 12/18/2024 7.4  6.4 - 8.2 g/dL Final    AST 12/18/2024 13  9 - 39 U/L Final    Bilirubin, Total 12/18/2024 0.6  0.0 - 1.2 mg/dL Final    ALT 12/18/2024 10  10 - 52 U/L Final    WBC 12/18/2024 6.7  4.4 - 11.3 x10*3/uL Final     nRBC 12/18/2024 0.0  0.0 - 0.0 /100 WBCs Final    RBC 12/18/2024 4.37 (L)  4.50 - 5.90 x10*6/uL Final    Hemoglobin 12/18/2024 13.2 (L)  13.5 - 17.5 g/dL Final    Hematocrit 12/18/2024 41.1  41.0 - 52.0 % Final    MCV 12/18/2024 94  80 - 100 fL Final    MCH 12/18/2024 30.2  26.0 - 34.0 pg Final    MCHC 12/18/2024 32.1  32.0 - 36.0 g/dL Final    RDW 12/18/2024 14.5  11.5 - 14.5 % Final    Platelets 12/18/2024 190  150 - 450 x10*3/uL Final    Neutrophils % 12/18/2024 59.1  40.0 - 80.0 % Final    Immature Granulocytes %, Automated 12/18/2024 0.1  0.0 - 0.9 % Final    Lymphocytes % 12/18/2024 29.1  13.0 - 44.0 % Final    Monocytes % 12/18/2024 8.2  2.0 - 10.0 % Final    Eosinophils % 12/18/2024 3.1  0.0 - 6.0 % Final    Basophils % 12/18/2024 0.4  0.0 - 2.0 % Final    Neutrophils Absolute 12/18/2024 3.96  1.60 - 5.50 x10*3/uL Final    Immature Granulocytes Absolute, Au* 12/18/2024 0.01  0.00 - 0.50 x10*3/uL Final    Lymphocytes Absolute 12/18/2024 1.95  0.80 - 3.00 x10*3/uL Final    Monocytes Absolute 12/18/2024 0.55  0.05 - 0.80 x10*3/uL Final    Eosinophils Absolute 12/18/2024 0.21  0.00 - 0.40 x10*3/uL Final    Basophils Absolute 12/18/2024 0.03  0.00 - 0.10 x10*3/uL Final    Thyroid Stimulating Hormone 12/18/2024 2.23  0.44 - 3.98 mIU/L Final    Prostate Specific AG 12/18/2024 0.40  <=4.00 ng/mL Final   Office Visit on 12/18/2024   Component Date Value Ref Range Status    POC HEMOGLOBIN A1c 12/18/2024 8.0 (A)  4.2 - 6.5 % Final       Radiology: Reviewed imaging in powerchart.  No results found.    No family history on file.  Social History     Socioeconomic History    Marital status:    Tobacco Use    Smoking status: Never    Smokeless tobacco: Never   Substance and Sexual Activity    Alcohol use: Not Currently     Social Drivers of Health     Financial Resource Strain: Low Risk  (3/27/2024)    Overall Financial Resource Strain (CARDIA)     Difficulty of Paying Living Expenses: Not hard at all   Food  Insecurity: No Food Insecurity (12/4/2023)    Received from [email or web address]    Hunger Vital Sign     Worried About Running Out of Food in the Last Year: Never true     Ran Out of Food in the Last Year: Never true   Transportation Needs: No Transportation Needs (3/27/2024)    PRAPARE - Transportation     Lack of Transportation (Medical): No     Lack of Transportation (Non-Medical): No   Physical Activity: Not on File (2/26/2021)    Received from [employer]    Physical Activity     Physical Activity: 0   Stress: Not on File (2/26/2021)    Received from [facility] [employer] [facility]    Stress     Stress: 0   Social Connections: Not on File (2/26/2021)    Received from [employer]    Social Connections     Social Connections and Isolation: 0   Intimate Partner Violence: Not At Risk (12/4/2023)    Received from [email or web address]    Humiliation, Afraid, Rape, and Kick questionnaire     Fear of Current or Ex-Partner: No     Emotionally Abused: No     Physically Abused: No     Sexually Abused: No   Housing Stability: Low Risk  (3/27/2024)    Housing Stability Vital Sign     Unable to Pay for Housing in the Last Year: No     Number of Places Lived in the Last Year: 1     Unstable Housing in the Last Year: No     Past Medical History:   Diagnosis Date    Acute cystitis with hematuria 12/31/2016    Acute cystitis with hematuria    Personal history of other diseases of the circulatory system     History of hypertension    Personal history of other diseases of the circulatory system     History of cardiac disorder    Personal history of other endocrine, nutritional and metabolic disease     History of diabetic neuropathy    Personal history of other endocrine, nutritional and metabolic disease     History of hyperlipidemia    Personal history of other endocrine, nutritional and metabolic disease     History of type 2 diabetes mellitus    Personal history of other specified conditions     History of insomnia     Past Surgical  History:   Procedure Laterality Date    OTHER SURGICAL HISTORY  09/17/2019    Bypass    OTHER SURGICAL HISTORY  09/17/2019    Arterial stent placement       Charting was completed using voice recognition technology and may include unintended errors.

## 2025-03-20 ENCOUNTER — TELEPHONE (OUTPATIENT)
Dept: PRIMARY CARE | Facility: CLINIC | Age: 75
End: 2025-03-20
Payer: MEDICARE

## 2025-03-20 LAB
AMPHETAMINES UR QL: NEGATIVE NG/ML
ANION GAP SERPL CALCULATED.4IONS-SCNC: 12 MMOL/L (CALC) (ref 7–17)
BARBITURATES UR QL: NEGATIVE NG/ML
BENZODIAZ UR QL: NEGATIVE NG/ML
BUN SERPL-MCNC: 49 MG/DL (ref 7–25)
BUN/CREAT SERPL: 21 (CALC) (ref 6–22)
BZE UR QL: NEGATIVE NG/ML
CALCIUM SERPL-MCNC: 9.5 MG/DL (ref 8.6–10.3)
CHLORIDE SERPL-SCNC: 105 MMOL/L (ref 98–110)
CHOLEST SERPL-MCNC: 178 MG/DL
CHOLEST/HDLC SERPL: 3.5 (CALC)
CO2 SERPL-SCNC: 21 MMOL/L (ref 20–32)
CREAT SERPL-MCNC: 2.38 MG/DL (ref 0.7–1.28)
CREAT UR-MCNC: 17 MG/DL
EGFRCR SERPLBLD CKD-EPI 2021: 28 ML/MIN/1.73M2
GLUCOSE SERPL-MCNC: 117 MG/DL (ref 65–139)
HDLC SERPL-MCNC: 51 MG/DL
LDLC SERPL CALC-MCNC: 100 MG/DL (CALC)
METHADONE UR QL: NEGATIVE NG/ML
NONHDLC SERPL-MCNC: 127 MG/DL (CALC)
OPIATES UR QL: NEGATIVE NG/ML
OXIDANTS UR QL: NEGATIVE MCG/ML
OXYCODONE UR QL: NEGATIVE NG/ML
PCP UR QL: NEGATIVE NG/ML
PH UR: 6.2 [PH] (ref 4.5–9)
POTASSIUM SERPL-SCNC: 5.3 MMOL/L (ref 3.5–5.3)
QUEST NOTES AND COMMENTS: ABNORMAL
SODIUM SERPL-SCNC: 138 MMOL/L (ref 135–146)
SP GR UR: 1.01
THC UR QL: NEGATIVE NG/ML
TRIGL SERPL-MCNC: 177 MG/DL
TSH SERPL-ACNC: 2.5 MIU/L (ref 0.4–4.5)

## 2025-03-20 NOTE — TELEPHONE ENCOUNTER
----- Message from Nydia De La Paz sent at 3/20/2025  9:10 AM EDT -----  Renal function stable. Continue nephrology follow up and avoid nephrotoxic medications.     LDL not at goal for DM. Increase atorvastatin to 80mg once daily please and let pt know. Low fat, low carb diet recommended.

## 2025-04-28 ENCOUNTER — TELEPHONE (OUTPATIENT)
Dept: PRIMARY CARE | Facility: CLINIC | Age: 75
End: 2025-04-28
Payer: MEDICARE

## 2025-04-28 DIAGNOSIS — R06.02 SHORTNESS OF BREATH: ICD-10-CM

## 2025-04-28 NOTE — TELEPHONE ENCOUNTER
Pt states that he is uncomfortable sleeping in bed but better when switching positions? I scheduled appt for Wednesday, is this ok? He did make comment will go to er if needed

## 2025-04-28 NOTE — TELEPHONE ENCOUNTER
Spoke to patient - he stated when he sleeps on his side he has issues breathing / hard to breath. Only experiencing at night time when sleeping. Please advise on recommendations.

## 2025-04-29 NOTE — TELEPHONE ENCOUNTER
Lvm. Patient to have chest xray done today so we can review at appointment tomorrow - order sent to radiology.

## 2025-04-30 ENCOUNTER — APPOINTMENT (OUTPATIENT)
Dept: PRIMARY CARE | Facility: CLINIC | Age: 75
End: 2025-04-30
Payer: MEDICARE

## 2025-04-30 ENCOUNTER — TELEPHONE (OUTPATIENT)
Dept: PRIMARY CARE | Facility: CLINIC | Age: 75
End: 2025-04-30

## 2025-04-30 NOTE — TELEPHONE ENCOUNTER
----- Message from Nydia De La Paz sent at 4/30/2025  9:35 AM EDT -----  Mild vasc congestion. Will follow up in office today as previously scheduled.   ----- Message -----  From: Anita Palmer - Imaging Results In  Sent: 4/29/2025  11:28 AM EDT  To: Nydia De La Paz MD

## 2025-05-02 ENCOUNTER — TELEMEDICINE (OUTPATIENT)
Dept: PRIMARY CARE | Facility: CLINIC | Age: 75
End: 2025-05-02
Payer: MEDICARE

## 2025-05-02 DIAGNOSIS — I50.9 ACUTE ON CHRONIC CONGESTIVE HEART FAILURE, UNSPECIFIED HEART FAILURE TYPE: Primary | ICD-10-CM

## 2025-05-02 DIAGNOSIS — R06.02 SHORTNESS OF BREATH: ICD-10-CM

## 2025-05-02 NOTE — PROGRESS NOTES
Subjective   Patient ID: Fredrick De La Paz is a 74 y.o. male who presents for follow up after his recent hospital admission with CHF exacerbation        Assessment/Plan       Shortness of breath likely mild CHF exacerbation 2/2 medication non-compliance   - X-ray obtained on 4/29/2025 shows mild vascular congestion.  - Patient was supposed to follow-up in office on 430 but canceled his appointment.  Plan  - Increase torsemide to 40 mg p.o. daily.  Patient is prescribed 40 mg but states that he has only been taking 20 mg p.o. daily  - Advised to resume oral potassium supplementation  - Continue isosorbide, Aldactone, hydralazine  - Advised to return to office next week to reassess and repeat x-ray.    CHF/HFrEF (EF 25%)  CAD  -Echo on 3/27/24 shows EF 25%  -Was prescribed GDMT but was unable to afford Entresto.  Continues to take [id number] mg p.o. daily  -Has frequent exacerbations  -Had cardiac cath  in March 2024.  No intervention done at that  PLAN  -Losartan continued by nephrology   -continue isosorbide dinitrate BID, hydralazine 10mg BID, aldactone 25mg once daily, metoprolol, torsemide.   -Referred to EP for AICD. Given number to EP at [facility]  during previous appointment, Still has not made an appointment.  - Low salt diet        HPI     Virtual or Telephone Consent    While technically available, the patient was unable or unwilling to consent to connect via audio/video telehealth technology; therefore, I performed this visit using a real-time audio only connection between Fredrick De La Paz & Nydia De La Paz MD.  Verbal consent was requested and obtained from Fredrick De La Paz on this date, 05/02/25 for a telehealth visit and the patient's location was confirmed at the time of the visit.    74-year-old male presents for acute visit    Has been having approximately 1 week of increasing shortness of breath.  Shortness of breath is worse when sleeping in bed and better when switching positions.  We ordered  chest x-ray on 429 which shows mild vascular congestion.  Patient was to follow-up in office on 430 but canceled this appointment.  Virtual visit was done to discuss management.  Patient states that he is currently taking Aldactone and torsemide 20 mg p.o. daily.  Discussed with patient that torsemide was ordered as 40 mg p.o. daily and advised him to resume current dose.  Also advised to restart oral potassium supplementation.    Advised patient to follow up in office in the next 4 to 5 days to recheck chest x-ray, labs, physical exam.  He is agreeable with this plan.    Denies any chest pain, palpitations, nausea, vomiting, diarrhea, worsening dyspnea on exertion.  No new leg swelling reported.  No other complaints or concerns at this time.    Denies fevers, chills, weight loss, lightheadedness, dizziness, vision changes, sore throat, runny nose, CP,  cough, palpitations, n/v/d, abd pain, black/bloody stools, arthralgias, mood disturbance, or new numbness/weakness/tingling in arms/legs/face.      PMH: CKD, HFrEF, CAD, Insomnia, HTN, T2DM, VALERIE  Surgeries: PCI, renal artery stenting, PAD with stenting.     Social hx: Non smoker, denies Etoh drinking, no drugs, he med school lecturer, and is still working online part time    Visit Vitals  Smoking Status Never     PHYSICAL EXAM     Physical Exam     Visit Vitals  Smoking Status Never        Deferred      REVIEW OF SYSTEMS     ROS in HPI   No Known Allergies    Current Outpatient Medications   Medication Sig Dispense Refill    allopurinol (Zyloprim) 100 mg tablet Take 1 tablet (100 mg) by mouth once daily. (Patient taking differently: Take 1 tablet (100 mg) by mouth 2 times a day.) 90 tablet 3    amLODIPine (Norvasc) 10 mg tablet Take 1 tablet (10 mg) by mouth once daily. 30 tablet 3    aspirin 81 mg EC tablet Take 1 tablet (81 mg) by mouth once daily.      atorvastatin (Lipitor) 40 mg tablet Take 1 tablet (40 mg) by mouth once daily. 90 tablet 3    Basaglar KwikPen  U-100 Insulin 100 unit/mL (3 mL) pen Inject 12 Units under the skin once daily in the morning.      clopidogrel (Plavix) 75 mg tablet Take 1 tablet (75 mg) by mouth once daily. 30 tablet 3    diclofenac sodium (Voltaren) 1 % gel Apply 4.5 inches (4 g) topically 4 times a day. 100 g 1    hydrALAZINE (Apresoline) 10 mg tablet Take 1 tablet (10 mg) by mouth 2 times a day.      hydrOXYzine HCL (Atarax) 50 mg tablet Take 1 tablet (50 mg) by mouth if needed.      insulin lispro (HumaLOG U-100 Insulin) 100 unit/mL injection Inject 15 Units under the skin 2 times daily (morning and late afternoon). Take as directed per insulin instructions.      isosorbide dinitrate (Isordil) 10 mg tablet Take 1 tablet (10 mg) by mouth 2 times a day.      losartan (Cozaar) 25 mg tablet Take 1 tablet (25 mg) by mouth once daily.      metoprolol succinate XL (Toprol-XL) 100 mg 24 hr tablet Take 1 tablet (100 mg) by mouth once daily.      multivitamin tablet Take 1 tablet by mouth once daily.      pantoprazole (ProtoNix) 40 mg EC tablet Take 1 tablet (40 mg) by mouth once daily in the morning. Take before meals. 30 tablet 3    spironolactone (Aldactone) 25 mg tablet Take 1 tablet (25 mg) by mouth once daily.      torsemide (Demadex) 20 mg tablet Take 2 tablets (40 mg) by mouth once daily. 90 tablet 1    traZODone (Desyrel) 50 mg tablet Take 1 tablet (50 mg) by mouth once daily at bedtime. 30 tablet 0    dapagliflozin propanediol (Farxiga) 10 mg Take 1 tablet (10 mg) by mouth once daily. (Patient not taking: Reported on 5/2/2025) 30 tablet 2    potassium chloride CR 20 mEq ER tablet Take 1 tablet (20 mEq) by mouth early in the morning.. (Patient not taking: Reported on 5/2/2025) 30 tablet 3    suvorexant (Belsomra) 10 mg tablet Take 1 tablet (10 mg) by mouth as needed at bedtime for sleep for up to 10 days. 10 tablet 0    zolpidem (Ambien) 10 mg tablet Take 1 tablet (10 mg) by mouth once daily at bedtime. Do not fill before January 6, 2025. 30  tablet 1     No current facility-administered medications for this visit.       Objective     Office Visit on 03/19/2025   Component Date Value Ref Range Status    POC HEMOGLOBIN A1c 03/19/2025 8.3 (A)  4.2 - 6.5 % Final    GLUCOSE 03/19/2025 117  65 - 139 mg/dL Final    UREA NITROGEN (BUN) 03/19/2025 49 (H)  7 - 25 mg/dL Final    CREATININE 03/19/2025 2.38 (H)  0.70 - 1.28 mg/dL Final    EGFR 03/19/2025 28 (L)  > OR = 60 mL/min/1.73m2 Final    BUN/CREATININE RATIO 03/19/2025 21  6 - 22 (calc) Final    SODIUM 03/19/2025 138  135 - 146 mmol/L Final    POTASSIUM 03/19/2025 5.3  3.5 - 5.3 mmol/L Final    CHLORIDE 03/19/2025 105  98 - 110 mmol/L Final    CARBON DIOXIDE 03/19/2025 21  20 - 32 mmol/L Final    ELECTROLYTE BALANCE 03/19/2025 12  7 - 17 mmol/L (calc) Final    CALCIUM 03/19/2025 9.5  8.6 - 10.3 mg/dL Final    CHOLESTEROL, TOTAL 03/19/2025 178  <200 mg/dL Final    HDL CHOLESTEROL 03/19/2025 51  > OR = 40 mg/dL Final    TRIGLYCERIDES 03/19/2025 177 (H)  <150 mg/dL Final    LDL-CHOLESTEROL 03/19/2025 100 (H)  mg/dL (calc) Final    CHOL/HDLC RATIO 03/19/2025 3.5  <5.0 (calc) Final    NON HDL CHOLESTEROL 03/19/2025 127  <130 mg/dL (calc) Final    TSH W/REFLEX TO FT4 03/19/2025 2.50  0.40 - 4.50 mIU/L Final    Amphetamines 03/19/2025 NEGATIVE  <500 ng/mL Final    Barbiturates 03/19/2025 NEGATIVE  <300 ng/mL Final    Benzodiazepines 03/19/2025 NEGATIVE  <100 ng/mL Final    Cocaine Metabolite 03/19/2025 NEGATIVE  <150 ng/mL Final    Marijuana Metabolite 03/19/2025 NEGATIVE  <20 ng/mL Final    Methadone Metabolite 03/19/2025 NEGATIVE  <100 ng/mL Final    Opiates 03/19/2025 NEGATIVE  <100 ng/mL Final    Oxycodone 03/19/2025 NEGATIVE  <100 ng/mL Final    Phencyclidine 03/19/2025 NEGATIVE  <25 ng/mL Final    Creatinine 03/19/2025 17.0 (L)  > or = 20.0 mg/dL Final    Specific Gravity 03/19/2025 1.007  > or = 1.003 Final    pH 03/19/2025 6.2  4.5 - 9.0 Final    Oxidant 03/19/2025 NEGATIVE  <200 mcg/mL Final    Notes and  Comments 03/19/2025    Final   Orders Only on 03/05/2025   Component Date Value Ref Range Status    [facility] GFR AA 03/05/2025 25   Final    [facility] BUN 03/05/2025 57 (H)  9 - 23 mg/dL Final    [facility] Calcium 03/05/2025 9.3  8.7 - 10.4 mg/dL Final    [facility] Corrected Calcium 03/05/2025 9.6  8.5 - 10.5 mg/dL Final    [facility] ALB 03/05/2025 3.7  3.4 - 5.0 g/dL Final    [facility] Glucose 03/05/2025 191 (H)  74 - 106 mg/dL Final    [facility] Phosphorus 03/05/2025 5.2 (H)  2.0 - 5.1 mg/dL Final    [facility] Chloride 03/05/2025 103  98 - 107 mmol/L Final    [facility] Na 03/05/2025 137  135 - 145 mmol/L Final    [facility] BUN/Creat Ratio 03/05/2025 19.0   Final    [facility] Glomerular Filtration R* 03/05/2025 20  mL/min/1.73m? Final    [facility] Creatinine 03/05/2025 3.0 (H)  0.6 - 1.1 mg/dL Final    [facility] CO2, venous 03/05/2025 24.0  20.0 - 31.0 mmol/L Final    [facility] Calculated Osmolality 03/05/2025 295  275 - 295 mOsm/kg Final    [facility] K 03/05/2025 5.6 (H)  3.5 - 5.1 mmol/L Final   Orders Only on 02/19/2025   Component Date Value Ref Range Status    [facility] Instr WBC 02/19/2025 5.9   Final    [facility] MCHC 02/19/2025 32.8  32.0 - 37.0 g/dL Final    [facility] MCV 02/19/2025 91.8  80.0 - 100.0 fL Final    [facility] MPV 02/19/2025 8.9  7.4 - 10.4 fL Final    [facility] HGB 02/19/2025 12.5 (L)  13.5 - 17.5 g/dL Final    [facility] WBC 02/19/2025 5.9  4.5 - 11.0 x10 Final    [facility] RDW 02/19/2025 15.7 (H)  11.5 - 14.5 % Final    [facility] MCH 02/19/2025 30.1  27.0 - 34.0 pg Final    [facility] Nucleated RBC 02/19/2025 0  /100WBC Final    [facility] HCT 02/19/2025 38.1 (L)  41.0 - 52.0 % Final    [facility] RBC 02/19/2025 4.15 (L)  4.70 - 6.10 x10 Final    [facility] Platelet 02/19/2025 143 (L)  150 - 450 x10 Final    [facility] DIFF? 02/19/2025 No^NO   Final    [facility] Eos Count 02/19/2025 0.20  0.00 - 0.50 x1000 Final    [facility] Lymph % 02/19/2025 28.5  % Final     [facility] Lymph Count 02/19/2025 1.68  1.20 - 4.80 x1000 Final    [facility] Basos % 02/19/2025 0.5  % Final    [facility] Baso Count 02/19/2025 0.03  0.00 - 0.20 x1000 Final    [facility] Mono Count 02/19/2025 0.50  0.10 - 1.00 x1000 Final    [facility] Mono % 02/19/2025 8.4  % Final    [facility] Neutrophil % 02/19/2025 59.3  % Final    [facility] Neutrophil Count (ANC) 02/19/2025 3.50  1.40 - 8.80 x1000 Final    [facility] Eosin % 02/19/2025 3.3  % Final    [facility] Leukocyte Esterase, U 02/19/2025 Negative^NEGATIVE  Negative Final    [facility] Ketones, U 02/19/2025 Negative^NEGATIVE  Negative Final    [facility] Glucose Qual, U 02/19/2025 70 mg/dl^70 (A)  Negative Final    [facility] Urobilinogen Qual, U 02/19/2025 < 2 mg/dl^Normal  < 2 mg/dl Final    [facility] Color, U 02/19/2025 Colorless^Colorless  Yellow Final    [facility] pH, U 02/19/2025 6.0  4.5 - 8.0 Final    [facility] RBC/HPF, U 02/19/2025 <1  0 - 3 #/HPF Final    [facility] Specific Gravity, U 02/19/2025 1.010  1.001 - 1.035 Final    [facility] Bilirubin, U 02/19/2025 Negative^NEGATIVE  Negative Final    [facility] Nitrite, U 02/19/2025 Negative^NEGATIVE  Negative Final    [facility] Appearance, U 02/19/2025 Clear^Clear  Clear Final    [facility] Protein, U 02/19/2025 50 mg/dl^50 (A)  Negative Final    [facility] WBC/HPF, U 02/19/2025 <1  0 - 5 #/HPF Final    [facility] Blood, U 02/19/2025 Negative^NEGATIVE  Negative Final    [facility] U MICRO 02/19/2025 Indicated^IND   Final    [facility] GFR AA 02/19/2025 27   Final    [facility] CO2, venous 02/19/2025 25.0  20.0 - 31.0 mmol/L Final    [facility] K 02/19/2025 4.8  3.5 - 5.1 mmol/L Final    [facility] Calculated Osmolality 02/19/2025 300 (H)  275 - 295 mOsm/kg Final    [facility] Calcium 02/19/2025 9.2  8.7 - 10.4 mg/dL Final    [facility] BUN 02/19/2025 47 (H)  9 - 23 mg/dL Final    [facility] Glucose 02/19/2025 216 (H)  74 - 106 mg/dL Final    [facility] Chloride 02/19/2025 107  98 - 107  mmol/L Final    [facility] Glomerular Filtration R* 02/19/2025 22  mL/min/1.73m? Final    [facility] Na 02/19/2025 141  135 - 145 mmol/L Final    [facility] BUN/Creat Ratio 02/19/2025 16.8   Final    [facility] Creatinine 02/19/2025 2.8 (H)  0.6 - 1.1 mg/dL Final    [facility] I-PTH 02/19/2025 206.0 (H)  18.4 - 80.1 pg/mL Final    [facility] Magnesium 02/19/2025 1.9  1.6 - 2.6 mg/dL Final    [facility] Uric Acid 02/19/2025 7.2  3.7 - 9.2 mg/dL Final    [facility] Corrected Calcium 02/19/2025 9.6  8.5 - 10.5 mg/dL Final    [facility] CO2, venous 02/19/2025 25.0  20.0 - 31.0 mmol/L Final    [facility] K 02/19/2025 4.8  3.5 - 5.1 mmol/L Final    [facility] Calculated Osmolality 02/19/2025 300 (H)  275 - 295 mOsm/kg Final    [facility] Calcium 02/19/2025 9.2  8.7 - 10.4 mg/dL Final    [facility] BUN 02/19/2025 47 (H)  9 - 23 mg/dL Final    [facility] ALB 02/19/2025 3.5  3.4 - 5.0 g/dL Final    [facility] Glomerular Filtration R* 02/19/2025 22  mL/min/1.73m? Final    [facility] Glucose 02/19/2025 217 (H)  74 - 106 mg/dL Final    [facility] Chloride 02/19/2025 107  98 - 107 mmol/L Final    [facility] Phosphorus 02/19/2025 3.9  2.0 - 5.1 mg/dL Final    [facility] BUN/Creat Ratio 02/19/2025 16.8   Final    [facility] Na 02/19/2025 141  135 - 145 mmol/L Final    [facility] GFR AA 02/19/2025 27   Final    [facility] Creatinine 02/19/2025 2.8 (H)  0.6 - 1.1 mg/dL Final    [facility] Vit D 25 02/19/2025 9  ng/mL Final    [facility] Microalbumin/Creatinine* 02/19/2025 1,203 (H)  0 - 30 mg MALB/gm CREAT Final    [facility] Creatinine, Urine mg/dl 02/19/2025 36.9  mg/dL Final    [facility] Microalbumin, Urine mg/L 02/19/2025 444.0  mg/L Final       Radiology: Reviewed imaging in powerchart.  No results found.    No family history on file.  Social History     Socioeconomic History    Marital status:    Tobacco Use    Smoking status: Never    Smokeless tobacco: Never   Substance and Sexual Activity    Alcohol use: Not  Currently     Social Drivers of Health     Financial Resource Strain: Low Risk  (3/27/2024)    Overall Financial Resource Strain (CARDIA)     Difficulty of Paying Living Expenses: Not hard at all   Food Insecurity: No Food Insecurity (12/4/2023)    Received from [facility]    Hunger Vital Sign     Worried About Running Out of Food in the Last Year: Never true     Ran Out of Food in the Last Year: Never true   Transportation Needs: No Transportation Needs (3/27/2024)    PRAPARE - Transportation     Lack of Transportation (Medical): No     Lack of Transportation (Non-Medical): No   Physical Activity: Not on File (2/26/2021)    Received from [facility]    Physical Activity     Physical Activity: 0   Stress: Not on File (2/26/2021)    Received from [facility]    Stress     Stress: 0   Social Connections: Not on File (2/26/2021)    Received from [facility]    Social Connections     Social Connections and Isolation: 0   Intimate Partner Violence: Not At Risk (12/4/2023)    Received from [facility]    Humiliation, Afraid, Rape, and Kick questionnaire     Fear of Current or Ex-Partner: No     Emotionally Abused: No     Physically Abused: No     Sexually Abused: No   Housing Stability: Low Risk  (3/27/2024)    Housing Stability Vital Sign     Unable to Pay for Housing in the Last Year: No     Number of Places Lived in the Last Year: 1     Unstable Housing in the Last Year: No     Past Medical History:   Diagnosis Date    Acute cystitis with hematuria 12/31/2016    Acute cystitis with hematuria    Personal history of other diseases of the circulatory system     History of hypertension    Personal history of other diseases of the circulatory system     History of cardiac disorder    Personal history of other endocrine, nutritional and metabolic disease     History of diabetic neuropathy    Personal history of other endocrine, nutritional and metabolic disease     History of hyperlipidemia    Personal history of other endocrine, nutritional  and metabolic disease     History of type 2 diabetes mellitus    Personal history of other specified conditions     History of insomnia     Past Surgical History:   Procedure Laterality Date    OTHER SURGICAL HISTORY  09/17/2019    Bypass    OTHER SURGICAL HISTORY  09/17/2019    Arterial stent placement       Charting was completed using voice recognition technology and may include unintended errors.

## 2025-05-07 ENCOUNTER — APPOINTMENT (OUTPATIENT)
Dept: PRIMARY CARE | Facility: CLINIC | Age: 75
End: 2025-05-07
Payer: MEDICARE

## 2025-05-07 VITALS
OXYGEN SATURATION: 98 % | HEART RATE: 61 BPM | WEIGHT: 160 LBS | BODY MASS INDEX: 25.71 KG/M2 | HEIGHT: 66 IN | DIASTOLIC BLOOD PRESSURE: 68 MMHG | SYSTOLIC BLOOD PRESSURE: 136 MMHG

## 2025-05-07 DIAGNOSIS — Z79.4 TYPE 2 DIABETES MELLITUS WITH STAGE 3B CHRONIC KIDNEY DISEASE, WITH LONG-TERM CURRENT USE OF INSULIN (MULTI): ICD-10-CM

## 2025-05-07 DIAGNOSIS — I10 HYPERTENSION, UNSPECIFIED TYPE: ICD-10-CM

## 2025-05-07 DIAGNOSIS — R06.02 SHORTNESS OF BREATH: Primary | ICD-10-CM

## 2025-05-07 DIAGNOSIS — I50.20 HFREF (HEART FAILURE WITH REDUCED EJECTION FRACTION): ICD-10-CM

## 2025-05-07 DIAGNOSIS — J20.9 ACUTE BRONCHITIS, UNSPECIFIED ORGANISM: ICD-10-CM

## 2025-05-07 DIAGNOSIS — E11.22 TYPE 2 DIABETES MELLITUS WITH STAGE 3B CHRONIC KIDNEY DISEASE, WITH LONG-TERM CURRENT USE OF INSULIN (MULTI): ICD-10-CM

## 2025-05-07 DIAGNOSIS — N18.32 TYPE 2 DIABETES MELLITUS WITH STAGE 3B CHRONIC KIDNEY DISEASE, WITH LONG-TERM CURRENT USE OF INSULIN (MULTI): ICD-10-CM

## 2025-05-07 PROCEDURE — 3078F DIAST BP <80 MM HG: CPT | Performed by: STUDENT IN AN ORGANIZED HEALTH CARE EDUCATION/TRAINING PROGRAM

## 2025-05-07 PROCEDURE — 1160F RVW MEDS BY RX/DR IN RCRD: CPT | Performed by: STUDENT IN AN ORGANIZED HEALTH CARE EDUCATION/TRAINING PROGRAM

## 2025-05-07 PROCEDURE — 1036F TOBACCO NON-USER: CPT | Performed by: STUDENT IN AN ORGANIZED HEALTH CARE EDUCATION/TRAINING PROGRAM

## 2025-05-07 PROCEDURE — 3075F SYST BP GE 130 - 139MM HG: CPT | Performed by: STUDENT IN AN ORGANIZED HEALTH CARE EDUCATION/TRAINING PROGRAM

## 2025-05-07 PROCEDURE — 99214 OFFICE O/P EST MOD 30 MIN: CPT | Performed by: STUDENT IN AN ORGANIZED HEALTH CARE EDUCATION/TRAINING PROGRAM

## 2025-05-07 PROCEDURE — G2211 COMPLEX E/M VISIT ADD ON: HCPCS | Performed by: STUDENT IN AN ORGANIZED HEALTH CARE EDUCATION/TRAINING PROGRAM

## 2025-05-07 PROCEDURE — 1159F MED LIST DOCD IN RCRD: CPT | Performed by: STUDENT IN AN ORGANIZED HEALTH CARE EDUCATION/TRAINING PROGRAM

## 2025-05-07 RX ORDER — ALBUTEROL SULFATE 90 UG/1
2 INHALANT RESPIRATORY (INHALATION) EVERY 4 HOURS PRN
Qty: 8.5 G | Refills: 5 | Status: SHIPPED | OUTPATIENT
Start: 2025-05-07 | End: 2026-05-07

## 2025-05-07 RX ORDER — METHYLPREDNISOLONE 4 MG/1
TABLET ORAL
Qty: 21 TABLET | Refills: 0 | Status: SHIPPED | OUTPATIENT
Start: 2025-05-07 | End: 2025-05-13

## 2025-05-07 RX ORDER — CHLORPHENIRAMINE MALEATE AND DEXTROMETHORPHAN HYDROBROMIDE 4; 30 MG/1; MG/1
1 TABLET, FILM COATED ORAL EVERY 6 HOURS PRN
Qty: 28 TABLET | Refills: 0 | Status: SHIPPED | OUTPATIENT
Start: 2025-05-07 | End: 2025-05-14

## 2025-05-07 NOTE — PROGRESS NOTES
Subjective   Patient ID: Fredrick De La Paz is a 74 y.o. male who presents for follow up     AMW: 3/19/25; see chart     Assessment/Plan   Preventative Medicine  -UTD on vaccines. Flu shot UTD   -Had colonoscopy done recently per patient. Results to be brought  -PSA checked by previous PCP. States that it was normal but has a hx of BPH.   -PHQ2: 0  -Alcohol screening done  -ACP discussed (>16 minutes): Ex-Wife Corinne is HCA. Code status discussed would like to remain full code.   -Labs reviewed from [facility] nephrology. Had mild hyperkalemia and potassium was mildly elevated at 5.8.   -Check lipids, TSH, and BMP for potassium     Shortness of breath likely mild CHF exacerbation 2/2 medication non-compliance   - X-ray obtained on 4/29/2025 shows mild vascular congestion.  - Patient was supposed to follow-up in office on 430 but canceled his appointment.   Treated with increasing torsemide to 40mg PO daily. Currently no crackles on exam. Has some expiratory wheezing in BL lung fields.   PLAN  -medrol dose pack rx  -PRN albuterol rx   -Coricidin HBP rx given   -Continue chronic CHF medications  -Follow up in 6/19 for routine care     CHF/HFrEF (EF 25%)  CAD  -Echo on 3/27/24 shows EF 25%  -Was prescribed GDMT but was unable to afford Entresto.  Continues to take [id number] mg p.o. daily  -Has frequent exacerbations  -Had cardiac cath  in March 2024.  No intervention done at that  PLAN  -Losartan continued by nephrology   -continue isosorbide dinitrate BID, hydralazine 10mg BID, aldactone 25mg once daily, metoprolol, torsemide.   -Referred to EP for AICD. Given number to EP at [facility]  during previous appointment, Still has not made an appointment.  - Low salt diet    Pulmonary Nodule  -Seen in CT chest from March 2024  -Repeat this year (order given)     VALERIE  -Hx of previous remote stenting and balloon angioplasty  -Continue to monitor renal function  -Follows with Dr Casillas (vascular surgery)     T2DM  -A1c 8.3  (March 2025)  -Current insulin regimen is Basaglar 15U and sliding scale humalog.   -Follows with endocrine. Unknown when next appointment is  PLAN  -Increase basaglar to 17U and change to qPM. Seems as though sugars are higher overnight.   -Continue humalog SSI  -Low carb diet advised. Currently eats rice/lentils for breakfast/lunch. Eats traditional  food for dinner consisting of bread and vegetables.  -Has CGM. Log reviewed. Seems like sugars are staying elevated into the 200s overnight. Will change basaglar to at bedtime dosiong   -Continue follow up with Endocrine (Dr Garvey)     CKD-III  -Continue ARB  -Continue Farxiga (samples given today)   -Avoid nephrotoxic medications     Insomnia   -Continue ambien - refill given  -OARRS reviewed.     HTN  -Repeat 136/88  -Advised medication compliance and diet control  -Continue losartan, isordil, metoprolol, hydralazine   -Low salt diet     Blood work orders given to be done prior to next visit in June 2025.     OARRS Report Reviewed and appropriate.  UDS reviewed.  I have personally reviewed the OARRS report.  I have considered the risks of abuse, dependence, addiction and diversion. I believe that it is clinically appropriate for this patient to be prescribed this medication based on documented diagnosis.     Controlled Substance Agreement:   I have printed this form and reviewed each line item with the patient and the patient has verbalized understanding.   Date of the last Controlled Substance Agreement: 2/27/24  Date of UDS To be obtained    I discussed patient's OARRS report as well as the potential concern for overdose on prescribed opioid, sleep aid, gabapentin/Lyrica, and/or benzodiazepines. I explained that Overdose Risk Scores >460, require a Narcan prescription and places the patient at risk for potential death.      Patient understands that the risk of death can occur when using opioid, benzodiazepines, gabapentin, Lyrica, sleep aids, and illegal  "drugs. The risk of death especially increases when using the above medications and or illegal drugs in combination. I have reviewed with the patient and the patient is in agreement with the terms of the  Controlled Substance Agreement.      At this point in time, patient is in compliance with the above, and has no signs of dependence or addiction to the above prescribed medications.      HPI    74 male presents for follow-up.    He was recently seen via virtual visit for shortness of breath and heart failure exacerbation.  Chest x-ray done at that time showed mild pulmonary vascular congestion.  Patient was supposed to be taking torsemide 40 mg p.o. daily but states that he had decreased his dose to 20 mg daily.  We shortly increase dose to 40 mg daily and he states that shortness of breath has significantly improved.  Still has intermittent wheezing but no leg swelling, orthopnea, PND.    Discussed that he likely has acute bronchitis and he states that he agrees because he gets similar symptoms in the spring and summer.    Blood pressure reviewed.  Initially systolic blood pressure elevated in 150s but has improved to 130s over 60s.  Discussed trial of steroids with ambulatory blood pressure monitoring and as needed albuterol.  Patient is agreeable with this plan of action.    Has routine follow-up scheduled for June 2025 for other chronic medical conditions.    Denies fevers, chills, weight loss, lightheadedness, dizziness, vision changes, sore throat, runny nose, CP, palpitations, n/v/d, abd pain, black/bloody stools, arthralgias, mood disturbance, or new numbness/weakness/tingling in arms/legs/face.      PMH: CKD, HFrEF, CAD, Insomnia, HTN, T2DM, VALERIE  Surgeries: PCI, renal artery stenting, PAD with stenting.     Social hx: Non smoker, denies Etoh drinking, no drugs, he med school lecturer, and is still working online part time    Visit Vitals  /82   Pulse 61   Ht 1.676 m (5' 6\")   Wt 72.6 kg (160 lb) " "  SpO2 98%   BMI 25.82 kg/m²   Smoking Status Never   BSA 1.84 m²     PHYSICAL EXAM     Physical Exam     Visit Vitals  /82   Pulse 61   Ht 1.676 m (5' 6\")   Wt 72.6 kg (160 lb)   SpO2 98%   BMI 25.82 kg/m²   Smoking Status Never   BSA 1.84 m²        General: NAD. NCAT. Aox3   HEENT: PERRLA. EOMI. MMM. Nares patent bl.  Cardiovascular: RRR. S1/S2 wnl. No JVD.   Respiratory: BL minimal expiratory wheezing. No acute respiratory distress. No crackles  GI: Soft, NT abdomen.   MSK: ROM x 4. CTLS non-tender  Extremities: No BLLE edema.   Skin: No rashes or bruises.   Neuro: Aox3. Cranial Nerves grossly intact. Motor/sensory wnl.   Psych: Mood wnl.       REVIEW OF SYSTEMS     ROS in HPI   No Known Allergies    Current Outpatient Medications   Medication Sig Dispense Refill    allopurinol (Zyloprim) 100 mg tablet Take 1 tablet (100 mg) by mouth once daily. (Patient taking differently: Take 1 tablet (100 mg) by mouth 2 times a day.) 90 tablet 3    amLODIPine (Norvasc) 10 mg tablet Take 1 tablet (10 mg) by mouth once daily. 30 tablet 3    aspirin 81 mg EC tablet Take 1 tablet (81 mg) by mouth once daily.      atorvastatin (Lipitor) 40 mg tablet Take 1 tablet (40 mg) by mouth once daily. 90 tablet 3    Basaglar KwikPen U-100 Insulin 100 unit/mL (3 mL) pen Inject 12 Units under the skin once daily in the morning.      clopidogrel (Plavix) 75 mg tablet Take 1 tablet (75 mg) by mouth once daily. 30 tablet 3    diclofenac sodium (Voltaren) 1 % gel Apply 4.5 inches (4 g) topically 4 times a day. 100 g 1    hydrALAZINE (Apresoline) 10 mg tablet Take 1 tablet (10 mg) by mouth 2 times a day.      hydrOXYzine HCL (Atarax) 50 mg tablet Take 1 tablet (50 mg) by mouth if needed.      insulin lispro (HumaLOG U-100 Insulin) 100 unit/mL injection Inject 15 Units under the skin 2 times daily (morning and late afternoon). Take as directed per insulin instructions.      isosorbide dinitrate (Isordil) 10 mg tablet Take 1 tablet (10 mg) by " mouth 2 times a day.      losartan (Cozaar) 25 mg tablet Take 1 tablet (25 mg) by mouth once daily.      metoprolol succinate XL (Toprol-XL) 100 mg 24 hr tablet Take 1 tablet (100 mg) by mouth once daily.      multivitamin tablet Take 1 tablet by mouth once daily.      pantoprazole (ProtoNix) 40 mg EC tablet Take 1 tablet (40 mg) by mouth once daily in the morning. Take before meals. 30 tablet 3    spironolactone (Aldactone) 25 mg tablet Take 1 tablet (25 mg) by mouth once daily.      torsemide (Demadex) 20 mg tablet Take 2 tablets (40 mg) by mouth once daily. 90 tablet 1    traZODone (Desyrel) 50 mg tablet Take 1 tablet (50 mg) by mouth once daily at bedtime. 30 tablet 0    albuterol (ProAir HFA) 90 mcg/actuation inhaler Inhale 2 puffs every 4 hours if needed for wheezing or shortness of breath. 8.5 g 5    chlorpheniramine-dextromethorp (Coricidin HBP Cough and Cold) 4-30 mg tablet Take 1 tablet by mouth every 6 hours if needed (cough/cold symptoms) for up to 7 days. 28 tablet 0    dapagliflozin propanediol (Farxiga) 10 mg Take 1 tablet (10 mg) by mouth once daily. (Patient not taking: Reported on 3/19/2025) 30 tablet 2    methylPREDNISolone (Medrol Dospak) 4 mg tablets Take as directed on package. 21 tablet 0    potassium chloride CR 20 mEq ER tablet Take 1 tablet (20 mEq) by mouth early in the morning.. (Patient not taking: Reported on 5/7/2025) 30 tablet 3    suvorexant (Belsomra) 10 mg tablet Take 1 tablet (10 mg) by mouth as needed at bedtime for sleep for up to 10 days. 10 tablet 0    zolpidem (Ambien) 10 mg tablet Take 1 tablet (10 mg) by mouth once daily at bedtime. Do not fill before January 6, 2025. 30 tablet 1     No current facility-administered medications for this visit.       Objective     Office Visit on 03/19/2025   Component Date Value Ref Range Status    POC HEMOGLOBIN A1c 03/19/2025 8.3 (A)  4.2 - 6.5 % Final    GLUCOSE 03/19/2025 117  65 - 139 mg/dL Final    UREA NITROGEN (BUN) 03/19/2025 49  (H)  7 - 25 mg/dL Final    CREATININE 03/19/2025 2.38 (H)  0.70 - 1.28 mg/dL Final    EGFR 03/19/2025 28 (L)  > OR = 60 mL/min/1.73m2 Final    BUN/CREATININE RATIO 03/19/2025 21  6 - 22 (calc) Final    SODIUM 03/19/2025 138  135 - 146 mmol/L Final    POTASSIUM 03/19/2025 5.3  3.5 - 5.3 mmol/L Final    CHLORIDE 03/19/2025 105  98 - 110 mmol/L Final    CARBON DIOXIDE 03/19/2025 21  20 - 32 mmol/L Final    ELECTROLYTE BALANCE 03/19/2025 12  7 - 17 mmol/L (calc) Final    CALCIUM 03/19/2025 9.5  8.6 - 10.3 mg/dL Final    CHOLESTEROL, TOTAL 03/19/2025 178  <200 mg/dL Final    HDL CHOLESTEROL 03/19/2025 51  > OR = 40 mg/dL Final    TRIGLYCERIDES 03/19/2025 177 (H)  <150 mg/dL Final    LDL-CHOLESTEROL 03/19/2025 100 (H)  mg/dL (calc) Final    CHOL/HDLC RATIO 03/19/2025 3.5  <5.0 (calc) Final    NON HDL CHOLESTEROL 03/19/2025 127  <130 mg/dL (calc) Final    TSH W/REFLEX TO FT4 03/19/2025 2.50  0.40 - 4.50 mIU/L Final    Amphetamines 03/19/2025 NEGATIVE  <500 ng/mL Final    Barbiturates 03/19/2025 NEGATIVE  <300 ng/mL Final    Benzodiazepines 03/19/2025 NEGATIVE  <100 ng/mL Final    Cocaine Metabolite 03/19/2025 NEGATIVE  <150 ng/mL Final    Marijuana Metabolite 03/19/2025 NEGATIVE  <20 ng/mL Final    Methadone Metabolite 03/19/2025 NEGATIVE  <100 ng/mL Final    Opiates 03/19/2025 NEGATIVE  <100 ng/mL Final    Oxycodone 03/19/2025 NEGATIVE  <100 ng/mL Final    Phencyclidine 03/19/2025 NEGATIVE  <25 ng/mL Final    Creatinine 03/19/2025 17.0 (L)  > or = 20.0 mg/dL Final    Specific Gravity 03/19/2025 1.007  > or = 1.003 Final    pH 03/19/2025 6.2  4.5 - 9.0 Final    Oxidant 03/19/2025 NEGATIVE  <200 mcg/mL Final    Notes and Comments 03/19/2025    Final   Orders Only on 03/05/2025   Component Date Value Ref Range Status    [facility] GFR AA 03/05/2025 25   Final    [facility] BUN 03/05/2025 57 (H)  9 - 23 mg/dL Final    [facility] Calcium 03/05/2025 9.3  8.7 - 10.4 mg/dL Final    [facility] Corrected Calcium 03/05/2025 9.6  8.5  - 10.5 mg/dL Final    [facility] ALB 03/05/2025 3.7  3.4 - 5.0 g/dL Final    [facility] Glucose 03/05/2025 191 (H)  74 - 106 mg/dL Final    [facility] Phosphorus 03/05/2025 5.2 (H)  2.0 - 5.1 mg/dL Final    [facility] Chloride 03/05/2025 103  98 - 107 mmol/L Final    [facility] Na 03/05/2025 137  135 - 145 mmol/L Final    [facility] BUN/Creat Ratio 03/05/2025 19.0   Final    [facility] Glomerular Filtration R* 03/05/2025 20  mL/min/1.73m? Final    [facility] Creatinine 03/05/2025 3.0 (H)  0.6 - 1.1 mg/dL Final    [facility] CO2, venous 03/05/2025 24.0  20.0 - 31.0 mmol/L Final    [facility] Calculated Osmolality 03/05/2025 295  275 - 295 mOsm/kg Final    [facility] K 03/05/2025 5.6 (H)  3.5 - 5.1 mmol/L Final   Orders Only on 02/19/2025   Component Date Value Ref Range Status    [facility] Instr WBC 02/19/2025 5.9   Final    [facility] MCHC 02/19/2025 32.8  32.0 - 37.0 g/dL Final    [facility] MCV 02/19/2025 91.8  80.0 - 100.0 fL Final    [facility] MPV 02/19/2025 8.9  7.4 - 10.4 fL Final    [facility] HGB 02/19/2025 12.5 (L)  13.5 - 17.5 g/dL Final    [facility] WBC 02/19/2025 5.9  4.5 - 11.0 x10 Final    [facility] RDW 02/19/2025 15.7 (H)  11.5 - 14.5 % Final    [facility] MCH 02/19/2025 30.1  27.0 - 34.0 pg Final    [facility] Nucleated RBC 02/19/2025 0  /100WBC Final    [facility] HCT 02/19/2025 38.1 (L)  41.0 - 52.0 % Final    [facility] RBC 02/19/2025 4.15 (L)  4.70 - 6.10 x10 Final    [facility] Platelet 02/19/2025 143 (L)  150 - 450 x10 Final    [facility] DIFF? 02/19/2025 No^NO   Final    [facility] Eos Count 02/19/2025 0.20  0.00 - 0.50 x1000 Final    [facility] Lymph % 02/19/2025 28.5  % Final    [facility] Lymph Count 02/19/2025 1.68  1.20 - 4.80 x1000 Final    [facility] Basos % 02/19/2025 0.5  % Final    [facility] Baso Count 02/19/2025 0.03  0.00 - 0.20 x1000 Final    [facility] Mono Count 02/19/2025 0.50  0.10 - 1.00 x1000 Final    [facility] Mono % 02/19/2025 8.4  % Final    [facility] Neutrophil %  02/19/2025 59.3  % Final    [facility] Neutrophil Count (ANC) 02/19/2025 3.50  1.40 - 8.80 x1000 Final    [facility] Eosin % 02/19/2025 3.3  % Final    [facility] Leukocyte Esterase, U 02/19/2025 Negative^NEGATIVE  Negative Final    [facility] Ketones, U 02/19/2025 Negative^NEGATIVE  Negative Final    [facility] Glucose Qual, U 02/19/2025 70 mg/dl^70 (A)  Negative Final    [facility] Urobilinogen Qual, U 02/19/2025 < 2 mg/dl^Normal  < 2 mg/dl Final    [facility] Color, U 02/19/2025 Colorless^Colorless  Yellow Final    [facility] pH, U 02/19/2025 6.0  4.5 - 8.0 Final    [facility] RBC/HPF, U 02/19/2025 <1  0 - 3 #/HPF Final    [facility] Specific Gravity, U 02/19/2025 1.010  1.001 - 1.035 Final    [facility] Bilirubin, U 02/19/2025 Negative^NEGATIVE  Negative Final    [facility] Nitrite, U 02/19/2025 Negative^NEGATIVE  Negative Final    [facility] Appearance, U 02/19/2025 Clear^Clear  Clear Final    [facility] Protein, U 02/19/2025 50 mg/dl^50 (A)  Negative Final    [facility] WBC/HPF, U 02/19/2025 <1  0 - 5 #/HPF Final    [facility] Blood, U 02/19/2025 Negative^NEGATIVE  Negative Final    [facility] U MICRO 02/19/2025 Indicated^IND   Final    [facility] GFR AA 02/19/2025 27   Final    [facility] CO2, venous 02/19/2025 25.0  20.0 - 31.0 mmol/L Final    [facility] K 02/19/2025 4.8  3.5 - 5.1 mmol/L Final    [facility] Calculated Osmolality 02/19/2025 300 (H)  275 - 295 mOsm/kg Final    [facility] Calcium 02/19/2025 9.2  8.7 - 10.4 mg/dL Final    [facility] BUN 02/19/2025 47 (H)  9 - 23 mg/dL Final    [facility] Glucose 02/19/2025 216 (H)  74 - 106 mg/dL Final    [facility] Chloride 02/19/2025 107  98 - 107 mmol/L Final    [facility] Glomerular Filtration R* 02/19/2025 22  mL/min/1.73m? Final    [facility] Na 02/19/2025 141  135 - 145 mmol/L Final    [facility] BUN/Creat Ratio 02/19/2025 16.8   Final    [facility] Creatinine 02/19/2025 2.8 (H)  0.6 - 1.1 mg/dL Final    [facility] I-PTH 02/19/2025 206.0 (H)  18.4 - 80.1  pg/mL Final    [facility] Magnesium 02/19/2025 1.9  1.6 - 2.6 mg/dL Final    [facility] Uric Acid 02/19/2025 7.2  3.7 - 9.2 mg/dL Final    [facility] Corrected Calcium 02/19/2025 9.6  8.5 - 10.5 mg/dL Final    [facility] CO2, venous 02/19/2025 25.0  20.0 - 31.0 mmol/L Final    [facility] K 02/19/2025 4.8  3.5 - 5.1 mmol/L Final    [facility] Calculated Osmolality 02/19/2025 300 (H)  275 - 295 mOsm/kg Final    [facility] Calcium 02/19/2025 9.2  8.7 - 10.4 mg/dL Final    [facility] BUN 02/19/2025 47 (H)  9 - 23 mg/dL Final    [facility] ALB 02/19/2025 3.5  3.4 - 5.0 g/dL Final    [facility] Glomerular Filtration R* 02/19/2025 22  mL/min/1.73m? Final    [facility] Glucose 02/19/2025 217 (H)  74 - 106 mg/dL Final    [facility] Chloride 02/19/2025 107  98 - 107 mmol/L Final    [facility] Phosphorus 02/19/2025 3.9  2.0 - 5.1 mg/dL Final    [facility] BUN/Creat Ratio 02/19/2025 16.8   Final    [facility] Na 02/19/2025 141  135 - 145 mmol/L Final    [facility] GFR AA 02/19/2025 27   Final    [facility] Creatinine 02/19/2025 2.8 (H)  0.6 - 1.1 mg/dL Final    [facility] Vit D 25 02/19/2025 9  ng/mL Final    [facility] Microalbumin/Creatinine* 02/19/2025 1,203 (H)  0 - 30 mg MALB/gm CREAT Final    [facility] Creatinine, Urine mg/dl 02/19/2025 36.9  mg/dL Final    [facility] Microalbumin, Urine mg/L 02/19/2025 444.0  mg/L Final       Radiology: Reviewed imaging in powerchart.  No results found.    No family history on file.  Social History     Socioeconomic History    Marital status:    Tobacco Use    Smoking status: Never    Smokeless tobacco: Never   Substance and Sexual Activity    Alcohol use: Not Currently     Social Drivers of Health     Financial Resource Strain: Low Risk  (3/27/2024)    Overall Financial Resource Strain (CARDIA)     Difficulty of Paying Living Expenses: Not hard at all   Food Insecurity: No Food Insecurity (12/4/2023)    Received from [employer]    Hunger Vital Sign     Worried About Running Out  of Food in the Last Year: Never true     Ran Out of Food in the Last Year: Never true   Transportation Needs: No Transportation Needs (3/27/2024)    PRAPARE - Transportation     Lack of Transportation (Medical): No     Lack of Transportation (Non-Medical): No   Physical Activity: Not on File (2/26/2021)    Received from [employer]    Physical Activity     Physical Activity: 0   Stress: Not on File (2/26/2021)    Received from [employer]    Stress     Stress: 0   Social Connections: Not on File (2/26/2021)    Received from [employer]    Social Connections     Social Connections and Isolation: 0   Intimate Partner Violence: Not At Risk (12/4/2023)    Received from [employer]    Humiliation, Afraid, Rape, and Kick questionnaire     Fear of Current or Ex-Partner: No     Emotionally Abused: No     Physically Abused: No     Sexually Abused: No   Housing Stability: Low Risk  (3/27/2024)    Housing Stability Vital Sign     Unable to Pay for Housing in the Last Year: No     Number of Places Lived in the Last Year: 1     Unstable Housing in the Last Year: No     Past Medical History:   Diagnosis Date    Acute cystitis with hematuria 12/31/2016    Acute cystitis with hematuria    Personal history of other diseases of the circulatory system     History of hypertension    Personal history of other diseases of the circulatory system     History of cardiac disorder    Personal history of other endocrine, nutritional and metabolic disease     History of diabetic neuropathy    Personal history of other endocrine, nutritional and metabolic disease     History of hyperlipidemia    Personal history of other endocrine, nutritional and metabolic disease     History of type 2 diabetes mellitus    Personal history of other specified conditions     History of insomnia     Past Surgical History:   Procedure Laterality Date    OTHER SURGICAL HISTORY  09/17/2019    Bypass    OTHER SURGICAL HISTORY  09/17/2019    Arterial stent placement       Charting was  completed using voice recognition technology and may include unintended errors.

## 2025-06-02 ENCOUNTER — TELEPHONE (OUTPATIENT)
Dept: PRIMARY CARE | Facility: CLINIC | Age: 75
End: 2025-06-02
Payer: MEDICARE

## 2025-06-02 NOTE — TELEPHONE ENCOUNTER
PT STATES HE HAD FILLINGS DONE A WEEK OR TWO AGAIN. NOW IS HAVING A LOT OF PAIN SINCE LAST NIGHT. HE STATES THAT HE CALLS THE DENTIST AND THEY TELL HIM TO GO ON LINE. HE IS REQUESTING ANTIBIOTICS AND PAIN MEDS. SHOULD HE STILL TRY CONTACTING DENTIST    [facility]: [phone]

## 2025-06-03 NOTE — TELEPHONE ENCOUNTER
Lmtcb.  Patient should see dental. He can call Dr. Codey Douglas phone number is [phone], located off of [facility].

## 2025-06-07 DIAGNOSIS — I50.20 HFREF (HEART FAILURE WITH REDUCED EJECTION FRACTION): ICD-10-CM

## 2025-06-07 DIAGNOSIS — N18.32 TYPE 2 DIABETES MELLITUS WITH STAGE 3B CHRONIC KIDNEY DISEASE, WITH LONG-TERM CURRENT USE OF INSULIN (MULTI): ICD-10-CM

## 2025-06-07 DIAGNOSIS — Z79.4 TYPE 2 DIABETES MELLITUS WITH STAGE 3B CHRONIC KIDNEY DISEASE, WITH LONG-TERM CURRENT USE OF INSULIN (MULTI): ICD-10-CM

## 2025-06-07 DIAGNOSIS — E11.22 TYPE 2 DIABETES MELLITUS WITH STAGE 3B CHRONIC KIDNEY DISEASE, WITH LONG-TERM CURRENT USE OF INSULIN (MULTI): ICD-10-CM

## 2025-06-17 LAB
NON-UH HIE A/G RATIO: 1.1
NON-UH HIE ALB: 4.1 G/DL (ref 3.4–5)
NON-UH HIE ALB: 4.2 G/DL (ref 3.4–5)
NON-UH HIE ALK PHOS: 85 UNIT/L (ref 45–117)
NON-UH HIE BASO COUNT: 0.06 X1000 (ref 0–0.2)
NON-UH HIE BASOS %: 1 %
NON-UH HIE BILIRUBIN, TOTAL: 0.5 MG/DL (ref 0.3–1.2)
NON-UH HIE BUN/CREAT RATIO: 20.9
NON-UH HIE BUN/CREAT RATIO: 20.9
NON-UH HIE BUN/CREAT RATIO: 21.2
NON-UH HIE BUN: 67 MG/DL (ref 9–23)
NON-UH HIE BUN: 69 MG/DL (ref 9–23)
NON-UH HIE BUN: 70 MG/DL (ref 9–23)
NON-UH HIE CALCIUM: 9.6 MG/DL (ref 8.7–10.4)
NON-UH HIE CALCIUM: 9.6 MG/DL (ref 8.7–10.4)
NON-UH HIE CALCIUM: 9.7 MG/DL (ref 8.7–10.4)
NON-UH HIE CALCULATED OSMOLALITY: 300 MOSM/KG (ref 275–295)
NON-UH HIE CALCULATED OSMOLALITY: 300 MOSM/KG (ref 275–295)
NON-UH HIE CALCULATED OSMOLALITY: 301 MOSM/KG (ref 275–295)
NON-UH HIE CHLORIDE: 103 MMOL/L (ref 98–107)
NON-UH HIE CHLORIDE: 103 MMOL/L (ref 98–107)
NON-UH HIE CHLORIDE: 105 MMOL/L (ref 98–107)
NON-UH HIE CO2, VENOUS: 24 MMOL/L (ref 20–31)
NON-UH HIE CORRECTED CALCIUM: ABNORMAL MG/DL (ref 8.5–10.5)
NON-UH HIE CREATININE, URINE MG/DL: 59.1 MG/DL
NON-UH HIE CREATININE: 3.2 MG/DL (ref 0.6–1.1)
NON-UH HIE CREATININE: 3.3 MG/DL (ref 0.6–1.1)
NON-UH HIE CREATININE: 3.3 MG/DL (ref 0.6–1.1)
NON-UH HIE DIFF?: ABNORMAL
NON-UH HIE EOS COUNT: 0.13 X1000 (ref 0–0.5)
NON-UH HIE EOSIN %: 2.3 %
NON-UH HIE GFR AA: 22
NON-UH HIE GFR AA: 22
NON-UH HIE GFR AA: 23
NON-UH HIE GFR ESTIMATED: 19
NON-UH HIE GLOBULIN: 4 G/DL
NON-UH HIE GLOMERULAR FILTRATION RATE: 18 ML/MIN/1.73M?
NON-UH HIE GLOMERULAR FILTRATION RATE: 18 ML/MIN/1.73M?
NON-UH HIE GLOMERULAR FILTRATION RATE: 19 ML/MIN/1.73M?
NON-UH HIE GLUCOSE: 228 MG/DL (ref 74–106)
NON-UH HIE GLUCOSE: 228 MG/DL (ref 74–106)
NON-UH HIE GLUCOSE: 229 MG/DL (ref 74–106)
NON-UH HIE GOT: 15 UNIT/L (ref 15–37)
NON-UH HIE GPT: 10 UNIT/L (ref 10–49)
NON-UH HIE HCT: 41 % (ref 41–52)
NON-UH HIE HGB A1C: 8.5 %
NON-UH HIE HGB: 13.4 G/DL (ref 13.5–17.5)
NON-UH HIE INSTR WBC: 5.7
NON-UH HIE K: 5.5 MMOL/L (ref 3.5–5.1)
NON-UH HIE LYMPH %: 23 %
NON-UH HIE LYMPH COUNT: 1.32 X1000 (ref 1.2–4.8)
NON-UH HIE MCH: 29.9 PG (ref 27–34)
NON-UH HIE MCHC: 32.7 G/DL (ref 32–37)
NON-UH HIE MCV: 91.6 FL (ref 80–100)
NON-UH HIE MICROALBUMIN, URINE MG/L: 716 MG/L
NON-UH HIE MICROALBUMIN/CREATININE RATIO: 1212 MG MALB/GM CREAT (ref 0–30)
NON-UH HIE MONO %: 10 %
NON-UH HIE MONO COUNT: 0.57 X1000 (ref 0.1–1)
NON-UH HIE MPV: 8.7 FL (ref 7.4–10.4)
NON-UH HIE NA: 136 MMOL/L (ref 135–145)
NON-UH HIE NA: 136 MMOL/L (ref 135–145)
NON-UH HIE NA: 137 MMOL/L (ref 135–145)
NON-UH HIE NEUTROPHIL %: 63.8 %
NON-UH HIE NEUTROPHIL COUNT (ANC): 3.66 X1000 (ref 1.4–8.8)
NON-UH HIE NUCLEATED RBC: 0 /100WBC
NON-UH HIE PHOSPHORUS: 4.1 MG/DL (ref 2–5.1)
NON-UH HIE PLATELET: 176 X10 (ref 150–450)
NON-UH HIE RBC: 4.48 X10 (ref 4.7–6.1)
NON-UH HIE RDW: 15.1 % (ref 11.5–14.5)
NON-UH HIE TOTAL PROTEIN: 8.1 G/DL (ref 5.7–8.2)
NON-UH HIE TSH: 2.1 UIU/ML (ref 0.55–4.78)
NON-UH HIE WBC: 5.7 X10 (ref 4.5–11)

## 2025-06-18 ENCOUNTER — TELEPHONE (OUTPATIENT)
Dept: PRIMARY CARE | Facility: CLINIC | Age: 75
End: 2025-06-18
Payer: MEDICARE

## 2025-06-18 NOTE — TELEPHONE ENCOUNTER
----- Message from Nydia De La Paz sent at 6/17/2025 11:47 PM EDT -----  These labs were ordered last month.     K elevated at 5.5. Lokelma 10g PO TID x 1 day. 3 total doses. Repeat BMP in 1 week. Will need to consider holding losartan and or aldactone. Repeat BMP in 3 days (Friday)    A1c elevated at 8.5%. Increase basaglar to 20U qPM and continue sliding scale. Encourage to follow up with Dr Garvey (endocrine)   ----- Message -----  From: Estela Clinisync - Lab Results In  Sent: 6/17/2025   3:30 PM EDT  To: Nydia De La Paz MD

## 2025-06-19 ENCOUNTER — APPOINTMENT (OUTPATIENT)
Dept: PRIMARY CARE | Facility: CLINIC | Age: 75
End: 2025-06-19
Payer: MEDICARE

## 2025-06-19 ENCOUNTER — TELEPHONE (OUTPATIENT)
Dept: PRIMARY CARE | Facility: CLINIC | Age: 75
End: 2025-06-19

## 2025-06-19 VITALS
HEIGHT: 66 IN | SYSTOLIC BLOOD PRESSURE: 138 MMHG | HEART RATE: 66 BPM | OXYGEN SATURATION: 98 % | DIASTOLIC BLOOD PRESSURE: 62 MMHG | BODY MASS INDEX: 25.71 KG/M2 | WEIGHT: 160 LBS

## 2025-06-19 DIAGNOSIS — Z79.4 TYPE 2 DIABETES MELLITUS WITH CHRONIC KIDNEY DISEASE, WITH LONG-TERM CURRENT USE OF INSULIN, UNSPECIFIED CKD STAGE (MULTI): ICD-10-CM

## 2025-06-19 DIAGNOSIS — E11.22 TYPE 2 DIABETES MELLITUS WITH STAGE 3B CHRONIC KIDNEY DISEASE, WITH LONG-TERM CURRENT USE OF INSULIN (MULTI): ICD-10-CM

## 2025-06-19 DIAGNOSIS — Z79.4 TYPE 2 DIABETES MELLITUS WITH STAGE 3B CHRONIC KIDNEY DISEASE, WITH LONG-TERM CURRENT USE OF INSULIN (MULTI): ICD-10-CM

## 2025-06-19 DIAGNOSIS — E11.22 TYPE 2 DIABETES MELLITUS WITH CHRONIC KIDNEY DISEASE, WITH LONG-TERM CURRENT USE OF INSULIN, UNSPECIFIED CKD STAGE (MULTI): ICD-10-CM

## 2025-06-19 DIAGNOSIS — I10 HYPERTENSION, UNSPECIFIED TYPE: ICD-10-CM

## 2025-06-19 DIAGNOSIS — E87.5 HYPERKALEMIA: Primary | ICD-10-CM

## 2025-06-19 DIAGNOSIS — N18.32 TYPE 2 DIABETES MELLITUS WITH STAGE 3B CHRONIC KIDNEY DISEASE, WITH LONG-TERM CURRENT USE OF INSULIN (MULTI): ICD-10-CM

## 2025-06-19 DIAGNOSIS — F51.01 PRIMARY INSOMNIA: ICD-10-CM

## 2025-06-19 PROCEDURE — 1160F RVW MEDS BY RX/DR IN RCRD: CPT | Performed by: STUDENT IN AN ORGANIZED HEALTH CARE EDUCATION/TRAINING PROGRAM

## 2025-06-19 PROCEDURE — 1036F TOBACCO NON-USER: CPT | Performed by: STUDENT IN AN ORGANIZED HEALTH CARE EDUCATION/TRAINING PROGRAM

## 2025-06-19 PROCEDURE — G2211 COMPLEX E/M VISIT ADD ON: HCPCS | Performed by: STUDENT IN AN ORGANIZED HEALTH CARE EDUCATION/TRAINING PROGRAM

## 2025-06-19 PROCEDURE — 3075F SYST BP GE 130 - 139MM HG: CPT | Performed by: STUDENT IN AN ORGANIZED HEALTH CARE EDUCATION/TRAINING PROGRAM

## 2025-06-19 PROCEDURE — 3078F DIAST BP <80 MM HG: CPT | Performed by: STUDENT IN AN ORGANIZED HEALTH CARE EDUCATION/TRAINING PROGRAM

## 2025-06-19 PROCEDURE — 1159F MED LIST DOCD IN RCRD: CPT | Performed by: STUDENT IN AN ORGANIZED HEALTH CARE EDUCATION/TRAINING PROGRAM

## 2025-06-19 PROCEDURE — 99214 OFFICE O/P EST MOD 30 MIN: CPT | Performed by: STUDENT IN AN ORGANIZED HEALTH CARE EDUCATION/TRAINING PROGRAM

## 2025-06-19 RX ORDER — ZOLPIDEM TARTRATE 10 MG/1
10 TABLET ORAL NIGHTLY
Qty: 30 TABLET | Refills: 1 | Status: SHIPPED | OUTPATIENT
Start: 2025-06-19 | End: 2025-09-17

## 2025-06-19 RX ORDER — AMLODIPINE BESYLATE 10 MG/1
10 TABLET ORAL DAILY
Qty: 30 TABLET | Refills: 3 | Status: SHIPPED | OUTPATIENT
Start: 2025-06-19

## 2025-06-19 RX ORDER — INSULIN LISPRO 100 [IU]/ML
17 INJECTION, SOLUTION INTRAVENOUS; SUBCUTANEOUS 2 TIMES DAILY
Qty: 10 ML | Refills: 3 | Status: SHIPPED | OUTPATIENT
Start: 2025-06-19

## 2025-06-19 RX ORDER — INSULIN LISPRO 100 [IU]/ML
17 INJECTION, SOLUTION INTRAVENOUS; SUBCUTANEOUS
Qty: 10 ML | Refills: 2 | Status: SHIPPED | OUTPATIENT
Start: 2025-06-19 | End: 2025-06-19 | Stop reason: WASHOUT

## 2025-06-19 NOTE — PROGRESS NOTES
Subjective   Patient ID: Fredrick De La Paz is a 74 y.o. male who presents for follow up     AMW: 3/19/25; see chart     Assessment/Plan   Preventative Medicine  -UTD on vaccines. Flu shot UTD   -Had colonoscopy done recently per patient. Results to be brought  -PSA checked by previous PCP. States that it was normal but has a hx of BPH.   -PHQ2: 0  -Alcohol screening done  -ACP discussed (>16 minutes): Ex-Wife Corinne is HCA. Code status discussed would like to remain full code.       Hyperkalemia   -5.5 on most recent labs  -Currently on ARB and aldactone  -Discontinue aldactone  -Lokelma x 3 doses rx  -repeat BMP next Tuesday   -Avoid high potassium foods.   -Will discuss with nephrology and cardiology       CHF/HFrEF (EF 25%)  CAD  -Echo on 3/27/24 shows EF 25%  -Was prescribed GDMT but was unable to afford Entresto.  Continues to take [id number] mg p.o. daily  -Has frequent exacerbations  -Had cardiac cath  in March 2024.  No intervention done at that  PLAN  -Losartan continued by nephrology  -continue isosorbide dinitrate BID, hydralazine 10mg BID, metoprolol, torsemide.   -Aldactone held 6/19 for hyperkalemia   -Referred to EP for AICD. Given number to EP at [facility]  during previous appointment, Still has not made an appointment.  - Low salt diet    Pulmonary Nodule  -Seen in CT chest from March 2024  -Repeat this year (order given). Has not done yet. Order given to patient again       VALERIE  -Hx of previous remote stenting and balloon angioplasty  -Continue to monitor renal function  -Follows with Dr Casillas (vascular surgery)     T2DM  -A1c 8.5% in June 2025  -Current insulin regimen is Basaglar 15U and sliding scale humalog.   -Follows with endocrine. Unknown when next appointment is  PLAN  -Previously increased basaglar to 17U from 15U but patient did not do this. Currently A1c is 8.5%. Discussed importance of following directions when it comes to medications. He is understands and states that he  frequently forgets dose changes and throws away AVS that are given to him. Advised to take basaglar at night   -Continue humalog SSI  -Low carb diet advised. Currently eats rice/lentils for breakfast/lunch. Eats traditional  food for dinner consisting of bread and vegetables.  -Has CGM. Log reviewed. Seems like sugars are staying elevated into the 200s overnight. Will change basaglar to at bedtime dosiong   -Continue follow up with Endocrine (Dr Garvey). Has been missing multiple appointments with endocrine.      CKD-III  -Continue ARB  -Stopped taking farxiga due to cost  -Avoid nephrotoxic medications   -Continue follow up with nephrology     Insomnia   -Continue ambien - refill given  -OARRS reviewed.     HTN  -Repeat 138/62  -Advised medication compliance and diet control  -Continue losartan, isordil, metoprolol, hydralazine   -Low salt diet   -Consider increasing hydralazine to 20mg PO BID for better BP control.     Blood work orders given to be done prior to next visit in June 2025.     OARRS Report Reviewed and appropriate.  UDS reviewed.  I have personally reviewed the OARRS report.  I have considered the risks of abuse, dependence, addiction and diversion. I believe that it is clinically appropriate for this patient to be prescribed this medication based on documented diagnosis.     Controlled Substance Agreement:   I have printed this form and reviewed each line item with the patient and the patient has verbalized understanding.   Date of the last Controlled Substance Agreement: 2/27/24  Date of UDS To be obtained    I discussed patient's OARRS report as well as the potential concern for overdose on prescribed opioid, sleep aid, gabapentin/Lyrica, and/or benzodiazepines. I explained that Overdose Risk Scores >460, require a Narcan prescription and places the patient at risk for potential death.      Patient understands that the risk of death can occur when using opioid, benzodiazepines, gabapentin,  Lyrica, sleep aids, and illegal drugs. The risk of death especially increases when using the above medications and or illegal drugs in combination. I have reviewed with the patient and the patient is in agreement with the terms of the  Controlled Substance Agreement.      At this point in time, patient is in compliance with the above, and has no signs of dependence or addiction to the above prescribed medications.      HPI    74 male presents for follow-up.    He recently had labs done which shows persistent hyperkalemia with stable CKD.  Patient is currently on Aldactone, losartan.  No palpitations, lightheadedness, dizziness reported.  Discussed holding Aldactone and starting Lokelma.  Patient is agreeable with this plan.    In regards to his CHF he denies any shortness of breath, dyspnea on exertion, PND.  No leg edema reported.  Tolerating current medication therapy without any difficulty.  Has follow-up appointment with cardiology later this afternoon.    In regards to his diabetes patient's A1c is still elevated at 8.5.  During his last appointment we had requested patient to increase his Basaglar to 17 units but he did not do this.  States that he forgot to do it.  Diet is relatively okay but high in carbohydrates.  Discussed increasing Basaglar to 20 units every afternoon and Humalog to 17 units twice daily with meals.  Patient is agreeable to this plan.  Counseling given on dietary adherence.    Otherwise blood pressure remains suboptimally controlled on Norvasc, losartan, spironolactone, metoprolol, hydralazine.  Repeat blood pressure check shows improvement at 138/62.  Will continue to monitor blood pressure and consider increasing hydralazine to 20 mg p.o. twice daily or 10 mg p.o. 3 times daily.    Plan of care discussed, concerns addressed    ROS otherwise negative    Medication refills given to patient for amlodipine, Ambien    Patient takes Ambien as needed for insomnia.  Last fill was in June 1,  "2025.    Denies fevers, chills, weight loss, lightheadedness, dizziness, vision changes, sore throat, runny nose, CP, palpitations, n/v/d, abd pain, black/bloody stools, arthralgias, mood disturbance, or new numbness/weakness/tingling in arms/legs/face.      PMH: CKD, HFrEF, CAD, Insomnia, HTN, T2DM, VALERIE  Surgeries: PCI, renal artery stenting, PAD with stenting.     Social hx: Non smoker, denies Etoh drinking, no drugs, he med school lecturer, and is still working online part time    Visit Vitals  /82   Pulse 66   Ht 1.676 m (5' 6\")   Wt 72.6 kg (160 lb)   SpO2 98%   BMI 25.82 kg/m²   Smoking Status Never   BSA 1.84 m²     PHYSICAL EXAM     Physical Exam     Visit Vitals  /82   Pulse 66   Ht 1.676 m (5' 6\")   Wt 72.6 kg (160 lb)   SpO2 98%   BMI 25.82 kg/m²   Smoking Status Never   BSA 1.84 m²        General: NAD. NCAT. Aox3   HEENT: PERRLA. EOMI. MMM. Nares patent bl.  Cardiovascular: RRR. S1/S2 wnl. No JVD.   Respiratory: CTABL. No crackles/rales. No respiratory distress.   GI: Soft, NT abdomen.   MSK: ROM x 4. CTLS non-tender  Extremities: No BLLE edema.   Skin: No rashes or bruises.   Neuro: Aox3. Cranial Nerves grossly intact. Motor/sensory wnl.   Psych: Mood wnl.       REVIEW OF SYSTEMS     ROS in HPI   No Known Allergies    Current Outpatient Medications   Medication Sig Dispense Refill    albuterol (ProAir HFA) 90 mcg/actuation inhaler Inhale 2 puffs every 4 hours if needed for wheezing or shortness of breath. 8.5 g 5    allopurinol (Zyloprim) 100 mg tablet Take 1 tablet (100 mg) by mouth once daily. (Patient taking differently: Take 1 tablet (100 mg) by mouth 2 times a day.) 90 tablet 3    amLODIPine (Norvasc) 10 mg tablet Take 1 tablet (10 mg) by mouth once daily. 30 tablet 3    aspirin 81 mg EC tablet Take 1 tablet (81 mg) by mouth once daily.      atorvastatin (Lipitor) 40 mg tablet Take 1 tablet (40 mg) by mouth once daily. 90 tablet 3    Basaglar KwikPen U-100 Insulin 100 unit/mL (3 mL) pen " Inject 12 Units under the skin once daily in the morning.      clopidogrel (Plavix) 75 mg tablet Take 1 tablet (75 mg) by mouth once daily. 30 tablet 3    diclofenac sodium (Voltaren) 1 % gel Apply 4.5 inches (4 g) topically 4 times a day. 100 g 1    hydrALAZINE (Apresoline) 10 mg tablet Take 1 tablet (10 mg) by mouth 2 times a day.      hydrOXYzine HCL (Atarax) 50 mg tablet Take 1 tablet (50 mg) by mouth if needed.      insulin lispro (HumaLOG U-100 Insulin) 100 unit/mL injection Inject 15 Units under the skin 2 times daily (morning and late afternoon). Take as directed per insulin instructions.      isosorbide dinitrate (Isordil) 10 mg tablet Take 1 tablet (10 mg) by mouth 2 times a day.      losartan (Cozaar) 25 mg tablet Take 1 tablet (25 mg) by mouth once daily.      metoprolol succinate XL (Toprol-XL) 100 mg 24 hr tablet Take 1 tablet (100 mg) by mouth once daily.      multivitamin tablet Take 1 tablet by mouth once daily.      pantoprazole (ProtoNix) 40 mg EC tablet Take 1 tablet (40 mg) by mouth once daily in the morning. Take before meals. 30 tablet 3    spironolactone (Aldactone) 25 mg tablet Take 1 tablet (25 mg) by mouth once daily.      torsemide (Demadex) 20 mg tablet Take 2 tablets (40 mg) by mouth once daily. 90 tablet 1    traZODone (Desyrel) 50 mg tablet Take 1 tablet (50 mg) by mouth once daily at bedtime. 30 tablet 0    dapagliflozin propanediol (Farxiga) 10 mg Take 1 tablet (10 mg) by mouth once daily. (Patient not taking: Reported on 3/19/2025) 30 tablet 2    potassium chloride CR 20 mEq ER tablet Take 1 tablet (20 mEq) by mouth early in the morning.. (Patient not taking: Reported on 5/2/2025) 30 tablet 3    suvorexant (Belsomra) 10 mg tablet Take 1 tablet (10 mg) by mouth as needed at bedtime for sleep for up to 10 days. 10 tablet 0    zolpidem (Ambien) 10 mg tablet Take 1 tablet (10 mg) by mouth once daily at bedtime. Do not fill before January 6, 2025. 30 tablet 1     No current  facility-administered medications for this visit.       Objective     Orders Only on 06/17/2025   Component Date Value Ref Range Status    [facility] MCH 06/17/2025 29.9  27.0 - 34.0 pg Final    [facility] HCT 06/17/2025 41.0  41.0 - 52.0 % Final    [facility] RBC 06/17/2025 4.48 (L)  4.70 - 6.10 x10 Final    [facility] Nucleated RBC 06/17/2025 0  /100WBC Final    [facility] Instr WBC 06/17/2025 5.7   Final    [facility] MCHC 06/17/2025 32.7  32.0 - 37.0 g/dL Final    [facility] Platelet 06/17/2025 176  150 - 450 x10 Final    [facility] MCV 06/17/2025 91.6  80.0 - 100.0 fL Final    [facility] HGB 06/17/2025 13.4 (L)  13.5 - 17.5 g/dL Final    [facility] WBC 06/17/2025 5.7  4.5 - 11.0 x10 Final    [facility] RDW 06/17/2025 15.1 (H)  11.5 - 14.5 % Final    [facility] MPV 06/17/2025 8.7  7.4 - 10.4 fL Final    [facility] DIFF? 06/17/2025 No^NO   Final    [facility] Lymph Count 06/17/2025 1.32  1.20 - 4.80 x1000 Final    [facility] Basos % 06/17/2025 1.0  % Final    [facility] Baso Count 06/17/2025 0.06  0.00 - 0.20 x1000 Final    [facility] Mono Count 06/17/2025 0.57  0.10 - 1.00 x1000 Final    [facility] Mono % 06/17/2025 10.0  % Final    [facility] Neutrophil Count (ANC) 06/17/2025 3.66  1.40 - 8.80 x1000 Final    [facility] Neutrophil % 06/17/2025 63.8  % Final    [facility] Eos Count 06/17/2025 0.13  0.00 - 0.50 x1000 Final    [facility] Eosin % 06/17/2025 2.3  % Final    [facility] Lymph % 06/17/2025 23.0  % Final    [facility] Glucose 06/17/2025 229 (H)  74 - 106 mg/dL Final    [facility] Glomerular Filtration R* 06/17/2025 18  mL/min/1.73m? Final    [facility] Chloride 06/17/2025 103  98 - 107 mmol/L Final    [facility] Na 06/17/2025 136  135 - 145 mmol/L Final    [facility] BUN/Creat Ratio 06/17/2025 20.9   Final    [facility] Creatinine 06/17/2025 3.3 (H)  0.6 - 1.1 mg/dL Final    [facility] GFR AA 06/17/2025 22   Final    [facility] CO2, venous 06/17/2025 24.0  20.0 - 31.0 mmol/L Final    [facility] K  06/17/2025 5.5 (H)  3.5 - 5.1 mmol/L Final    [facility] Calculated Osmolality 06/17/2025 300 (H)  275 - 295 mOsm/kg Final    [facility] BUN 06/17/2025 69 (H)  9 - 23 mg/dL Final    [facility] Calcium 06/17/2025 9.7  8.7 - 10.4 mg/dL Final    [facility] HGB A1C 06/17/2025 8.5  % Final    [facility] A/G Ratio 06/17/2025 1.1   Final    [facility] Na 06/17/2025 136  135 - 145 mmol/L Final    [facility] BUN/Creat Ratio 06/17/2025 21.2   Final    [facility] Alk Phos 06/17/2025 85  45 - 117 unit/L Final    [facility] GPT 06/17/2025 10  10 - 49 unit/L Final    [facility] Creatinine 06/17/2025 3.3 (H)  0.6 - 1.1 mg/dL Final    [facility] CO2, venous 06/17/2025 24.0  20.0 - 31.0 mmol/L Final    [facility] Total Protein 06/17/2025 8.1  5.7 - 8.2 g/dL Final    [facility] Glomerular Filtration R* 06/17/2025 18  mL/min/1.73m? Final    [facility] Calculated Osmolality 06/17/2025 300 (H)  275 - 295 mOsm/kg Final    [facility] K 06/17/2025 5.5 (H)  3.5 - 5.1 mmol/L Final    [facility] Globulin 06/17/2025 4.0  g/dL Final    [facility] BUN 06/17/2025 70 (H)  9 - 23 mg/dL Final    [facility] Calcium 06/17/2025 9.6  8.7 - 10.4 mg/dL Final    [facility] ALB 06/17/2025 4.2  3.4 - 5.0 g/dL Final    [facility] Glucose 06/17/2025 228 (H)  74 - 106 mg/dL Final    [facility] GOT 06/17/2025 15  15 - 37 unit/L Final    [facility] GFR AA 06/17/2025 22   Final    [facility] Bilirubin, Total 06/17/2025 0.50  0.30 - 1.20 mg/dL Final    [facility] Chloride 06/17/2025 103  98 - 107 mmol/L Final    [facility] TSH 06/17/2025 2.10  0.55 - 4.78 uIU/ml Final    [facility] GFR AA 06/17/2025 23   Final    [facility] Glucose 06/17/2025 228 (H)  74 - 106 mg/dL Final    [facility] ALB 06/17/2025 4.1  3.4 - 5.0 g/dL Final    [facility] Creatinine 06/17/2025 3.2 (H)  0.6 - 1.1 mg/dL Final    [facility] GFR Estimated 06/17/2025 19   Final    [facility] Glomerular Filtration R* 06/17/2025 19  mL/min/1.73m? Final    [facility] CO2, venous 06/17/2025 24.0  20.0 -  31.0 mmol/L Final    [facility] Corrected Calcium 06/17/2025 NCAL  8.5 - 10.5 mg/dL Final    [facility] Calculated Osmolality 06/17/2025 301 (H)  275 - 295 mOsm/kg Final    [facility] Chloride 06/17/2025 105  98 - 107 mmol/L Final    [facility] Calcium 06/17/2025 9.6  8.7 - 10.4 mg/dL Final    [facility] K 06/17/2025 5.5 (H)  3.5 - 5.1 mmol/L Final    [facility] Phosphorus 06/17/2025 4.1  2.0 - 5.1 mg/dL Final    [facility] Na 06/17/2025 137  135 - 145 mmol/L Final    [facility] BUN 06/17/2025 67 (H)  9 - 23 mg/dL Final    [facility] BUN/Creat Ratio 06/17/2025 20.9   Final    [facility] Microalbumin/Creatinine* 06/17/2025 1,212 (H)  0 - 30 mg MALB/gm CREAT Final    [facility] Creatinine, Urine mg/dl 06/17/2025 59.1  mg/dL Final    [facility] Microalbumin, Urine mg/L 06/17/2025 716.0  mg/L Final   Office Visit on 03/19/2025   Component Date Value Ref Range Status    POC HEMOGLOBIN A1c 03/19/2025 8.3 (A)  4.2 - 6.5 % Final    GLUCOSE 03/19/2025 117  65 - 139 mg/dL Final    UREA NITROGEN (BUN) 03/19/2025 49 (H)  7 - 25 mg/dL Final    CREATININE 03/19/2025 2.38 (H)  0.70 - 1.28 mg/dL Final    EGFR 03/19/2025 28 (L)  > OR = 60 mL/min/1.73m2 Final    BUN/CREATININE RATIO 03/19/2025 21  6 - 22 (calc) Final    SODIUM 03/19/2025 138  135 - 146 mmol/L Final    POTASSIUM 03/19/2025 5.3  3.5 - 5.3 mmol/L Final    CHLORIDE 03/19/2025 105  98 - 110 mmol/L Final    CARBON DIOXIDE 03/19/2025 21  20 - 32 mmol/L Final    ELECTROLYTE BALANCE 03/19/2025 12  7 - 17 mmol/L (calc) Final    CALCIUM 03/19/2025 9.5  8.6 - 10.3 mg/dL Final    CHOLESTEROL, TOTAL 03/19/2025 178  <200 mg/dL Final    HDL CHOLESTEROL 03/19/2025 51  > OR = 40 mg/dL Final    TRIGLYCERIDES 03/19/2025 177 (H)  <150 mg/dL Final    LDL-CHOLESTEROL 03/19/2025 100 (H)  mg/dL (calc) Final    CHOL/HDLC RATIO 03/19/2025 3.5  <5.0 (calc) Final    NON HDL CHOLESTEROL 03/19/2025 127  <130 mg/dL (calc) Final    TSH W/REFLEX TO FT4 03/19/2025 2.50  0.40 - 4.50 mIU/L Final     Amphetamines 03/19/2025 NEGATIVE  <500 ng/mL Final    Barbiturates 03/19/2025 NEGATIVE  <300 ng/mL Final    Benzodiazepines 03/19/2025 NEGATIVE  <100 ng/mL Final    Cocaine Metabolite 03/19/2025 NEGATIVE  <150 ng/mL Final    Marijuana Metabolite 03/19/2025 NEGATIVE  <20 ng/mL Final    Methadone Metabolite 03/19/2025 NEGATIVE  <100 ng/mL Final    Opiates 03/19/2025 NEGATIVE  <100 ng/mL Final    Oxycodone 03/19/2025 NEGATIVE  <100 ng/mL Final    Phencyclidine 03/19/2025 NEGATIVE  <25 ng/mL Final    Creatinine 03/19/2025 17.0 (L)  > or = 20.0 mg/dL Final    Specific Gravity 03/19/2025 1.007  > or = 1.003 Final    pH 03/19/2025 6.2  4.5 - 9.0 Final    Oxidant 03/19/2025 NEGATIVE  <200 mcg/mL Final    Notes and Comments 03/19/2025    Final   Orders Only on 03/05/2025   Component Date Value Ref Range Status    [facility] GFR AA 03/05/2025 25   Final    [facility] BUN 03/05/2025 57 (H)  9 - 23 mg/dL Final    [facility] Calcium 03/05/2025 9.3  8.7 - 10.4 mg/dL Final    [facility] Corrected Calcium 03/05/2025 9.6  8.5 - 10.5 mg/dL Final    [facility] ALB 03/05/2025 3.7  3.4 - 5.0 g/dL Final    [facility] Glucose 03/05/2025 191 (H)  74 - 106 mg/dL Final    [facility] Phosphorus 03/05/2025 5.2 (H)  2.0 - 5.1 mg/dL Final    [facility] Chloride 03/05/2025 103  98 - 107 mmol/L Final    [facility] Na 03/05/2025 137  135 - 145 mmol/L Final    [facility] BUN/Creat Ratio 03/05/2025 19.0   Final    [facility] Glomerular Filtration R* 03/05/2025 20  mL/min/1.73m? Final    [facility] Creatinine 03/05/2025 3.0 (H)  0.6 - 1.1 mg/dL Final    [facility] CO2, venous 03/05/2025 24.0  20.0 - 31.0 mmol/L Final    [facility] Calculated Osmolality 03/05/2025 295  275 - 295 mOsm/kg Final    [facility] K 03/05/2025 5.6 (H)  3.5 - 5.1 mmol/L Final       Radiology: Reviewed imaging in powerchart.  No results found.    No family history on file.  Social History     Socioeconomic History    Marital status:    Tobacco Use    Smoking status: Never     Smokeless tobacco: Never   Substance and Sexual Activity    Alcohol use: Not Currently     Social Drivers of Health     Financial Resource Strain: Low Risk  (3/27/2024)    Overall Financial Resource Strain (CARDIA)     Difficulty of Paying Living Expenses: Not hard at all   Food Insecurity: No Food Insecurity (12/4/2023)    Received from [facility]    Hunger Vital Sign     Worried About Running Out of Food in the Last Year: Never true     Ran Out of Food in the Last Year: Never true   Transportation Needs: No Transportation Needs (3/27/2024)    PRAPARE - Transportation     Lack of Transportation (Medical): No     Lack of Transportation (Non-Medical): No   Physical Activity: Not on File (2/26/2021)    Received from [facility]    Physical Activity     Physical Activity: 0   Stress: Not on File (2/26/2021)    Received from [facility]    Stress     Stress: 0   Social Connections: Not on File (2/26/2021)    Received from [facility]    Social Connections     Social Connections and Isolation: 0   Intimate Partner Violence: Not At Risk (12/4/2023)    Received from [facility]    Humiliation, Afraid, Rape, and Kick questionnaire     Fear of Current or Ex-Partner: No     Emotionally Abused: No     Physically Abused: No     Sexually Abused: No   Housing Stability: Low Risk  (3/27/2024)    Housing Stability Vital Sign     Unable to Pay for Housing in the Last Year: No     Number of Places Lived in the Last Year: 1     Unstable Housing in the Last Year: No     Past Medical History:   Diagnosis Date    Acute cystitis with hematuria 12/31/2016    Acute cystitis with hematuria    Personal history of other diseases of the circulatory system     History of hypertension    Personal history of other diseases of the circulatory system     History of cardiac disorder    Personal history of other endocrine, nutritional and metabolic disease     History of diabetic neuropathy    Personal history of other endocrine, nutritional and metabolic disease      History of hyperlipidemia    Personal history of other endocrine, nutritional and metabolic disease     History of type 2 diabetes mellitus    Personal history of other specified conditions     History of insomnia     Past Surgical History:   Procedure Laterality Date    OTHER SURGICAL HISTORY  09/17/2019    Bypass    OTHER SURGICAL HISTORY  09/17/2019    Arterial stent placement       Charting was completed using voice recognition technology and may include unintended errors.

## 2025-06-19 NOTE — TELEPHONE ENCOUNTER
Patient in office today and would like to discuss with Dr. De La Paz. Noted in chief complaint in appointment.

## 2025-06-19 NOTE — TELEPHONE ENCOUNTER
Pharmacy is wondering about rx for insulin lispro. They received rx for vials and it was previously dispensed as pens. Please advise if ok to change to pens.

## 2025-06-23 DIAGNOSIS — E87.5 HYPERKALEMIA: ICD-10-CM

## 2025-06-24 LAB
NON-UH HIE BUN/CREAT RATIO: 19.7
NON-UH HIE BUN: 61 MG/DL (ref 9–23)
NON-UH HIE CALCIUM: 9.2 MG/DL (ref 8.7–10.4)
NON-UH HIE CALCULATED OSMOLALITY: 298 MOSM/KG (ref 275–295)
NON-UH HIE CHLORIDE: 103 MMOL/L (ref 98–107)
NON-UH HIE CO2, VENOUS: 25 MMOL/L (ref 20–31)
NON-UH HIE CREATININE: 3.1 MG/DL (ref 0.6–1.1)
NON-UH HIE GFR AA: 24
NON-UH HIE GLOMERULAR FILTRATION RATE: 20 ML/MIN/1.73M?
NON-UH HIE GLUCOSE: 193 MG/DL (ref 74–106)
NON-UH HIE K: 4.4 MMOL/L (ref 3.5–5.1)
NON-UH HIE NA: 138 MMOL/L (ref 135–145)

## 2025-06-25 ENCOUNTER — TELEPHONE (OUTPATIENT)
Dept: PRIMARY CARE | Facility: CLINIC | Age: 75
End: 2025-06-25
Payer: MEDICARE

## 2025-06-26 NOTE — TELEPHONE ENCOUNTER
----- Message from Nydia De La Paz sent at 6/26/2025  1:06 AM EDT -----  K normal.   Renal function stable  ----- Message -----  From: Estela Clinjessi - Lab Results In  Sent: 6/24/2025   4:03 PM EDT  To: Nydia De La Paz MD

## 2025-07-16 ENCOUNTER — PATIENT OUTREACH (OUTPATIENT)
Dept: PRIMARY CARE | Facility: CLINIC | Age: 75
End: 2025-07-16
Payer: MEDICARE

## 2025-07-16 NOTE — PROGRESS NOTES
Discharge Facility: [facility]    Discharge Diagnosis:    Fall Prevention      Heart Failure      Hypokalemia      Kidney Disease: Medicines to Avoid      Pneumonia      Pulmonary Edema: Inpatient: General Info      Respiratory Failure: General Info      SOB (Shortness of Breath)       Admission Date: 7/10/2025   Discharge Date:  7/16/2025     PCP Appointment Date: 7/18/2025     Specialist Appointment Date:     07/10/2025 16:10:35   With: ELVI FARMER, Internal Medicine  Address:  [address] [facility] [address] [address]  [address]  [place] [address]  [phone] Business (1)    When: 1 to 2 weeks   With: LVOE ARTEAGA  Address:  [address] [facility] [address] [facility] [address]  [address]  [place] [address]  [phone] Business (1)    When: 07/24/2025 15:00:00  Comments: THIS IS YOUR HOSPITAL DISCHARGE HEART FAILURE FOLLOW UP APPOINTMENT. PLEASE CALL THE OFFICE IF YOU CANNOT MAKE THIS APPOINTMENT.   With: Heart Failure Education ([facility])  Address:  [address]  [place] [address]  [phone] Business (1)    When: 07/22/2025 13:00:00  Comments: Please CALL [phone] if unable to attend. IF PATIENT IS DISCHARGE TO A NURSING FACILITY OR SKILLED NURSING CARE PLEASE DO NOT ATTEND.   With: weigh yourself every morning after urinating and record readings. Call your doctor if gain 2 pounds or more overnight or gain 5 pounds or more in 1 week  Address: Unknown  When: Unknown   With: Low salt diet - 2300mg per day - 600mg - 700mg per meal  Address: Unknown  When: Unknown     Hospital Encounter and Summary Linked: Yes    Unknown with LEONEL NGUYEN; BEBE HARRY; ELVI FARMER; MARGARITA FARMER      See discharge assessment below for further details     Wrap Up  Wrap Up Additional Comments: Patient educated on weighing  yourself every morning after urinating and record readings. Call your doctor if gain 2 pounds or more overnight or gain 5 pounds or more in 1  week. Patient aware of DC instructions along with medications,F/U appts antwan as instructed . Patient states would like to speak directly with PCP to dicuss medications further, F/U appt. 7/18, Patient encouraged to provide DC Summary along with medications to visit. Patient states unable to afford Farxiga will discuss as well upon visit. Patient encouraged to call providers for any questions concerns or change in condition. (7/16/2025 10:27 AM)    Engagement  Call Start Time: 1027 (7/16/2025 10:27 AM)    Medications  Medications reviewed with patient/caregiver?: Yes (7/16/2025 10:27 AM)  Is the patient having any side effects they believe may be caused by any medication additions or changes?: No (7/16/2025 10:27 AM)  Does the patient have all medications ordered at discharge?: Yes (7/16/2025 10:27 AM)  Care Management Interventions: -- (antwan PCP F/U 7/18) (7/16/2025 10:27 AM)  Prescription Comments: Please see DC Summary , patient request to speak directly to PCP to review meds / paper work (7/16/2025 10:27 AM)  Is the patient taking all medications as directed (includes completed medication regime)?: (S) -- (Pt states has all meds although unable to afford Farxiga will discuss with PCP) (7/16/2025 10:27 AM)  Medication Comments: Patient encouraged to take all meds along with DC paper work to PCP office 7/18 F/U appt. (7/16/2025 10:27 AM)    Appointments  Does the patient have a primary care provider?: Yes (7/16/2025 10:27 AM)  Care Management Interventions: Verified appointment date/time/provider (7/16/2025 10:27 AM)  Care Management Interventions: Advised to schedule with specialist (7/16/2025 10:27 AM)    Self Management  What is the home health agency?: NA (7/16/2025 10:27 AM)  What Durable Medical Equipment (DME) was ordered?: NA (7/16/2025 10:27 AM)    Patient Teaching  Does the patient have access to their discharge instructions?: Yes (7/16/2025 10:27 AM)  Care Management Interventions: Reviewed instructions  with patient (7/16/2025 10:27 AM)  What is the patient's perception of their health status since discharge?: Improving (7/16/2025 10:27 AM)  Is the patient/caregiver able to teach back the hierarchy of who to call/visit for symptoms/problems? PCP, Specialist, Home Health nurse, Urgent Care, ED, 911: Yes (7/16/2025 10:27 AM)

## 2025-07-18 ENCOUNTER — OFFICE VISIT (OUTPATIENT)
Dept: PRIMARY CARE | Facility: CLINIC | Age: 75
End: 2025-07-18
Payer: MEDICARE

## 2025-07-18 VITALS
SYSTOLIC BLOOD PRESSURE: 144 MMHG | BODY MASS INDEX: 25.55 KG/M2 | HEART RATE: 81 BPM | WEIGHT: 159 LBS | HEIGHT: 66 IN | OXYGEN SATURATION: 97 % | DIASTOLIC BLOOD PRESSURE: 78 MMHG

## 2025-07-18 DIAGNOSIS — F51.01 PRIMARY INSOMNIA: ICD-10-CM

## 2025-07-18 DIAGNOSIS — N18.30 STAGE 3 CHRONIC KIDNEY DISEASE, UNSPECIFIED WHETHER STAGE 3A OR 3B CKD (MULTI): ICD-10-CM

## 2025-07-18 DIAGNOSIS — Z76.89 ENCOUNTER FOR SUPPORT AND COORDINATION OF TRANSITION OF CARE: Primary | ICD-10-CM

## 2025-07-18 DIAGNOSIS — E87.5 HYPERKALEMIA: ICD-10-CM

## 2025-07-18 DIAGNOSIS — K21.9 GASTROESOPHAGEAL REFLUX DISEASE, UNSPECIFIED WHETHER ESOPHAGITIS PRESENT: ICD-10-CM

## 2025-07-18 RX ORDER — ZOLPIDEM TARTRATE 10 MG/1
10 TABLET ORAL NIGHTLY
Qty: 30 TABLET | Refills: 1 | Status: SHIPPED | OUTPATIENT
Start: 2025-08-05 | End: 2025-11-03

## 2025-07-18 RX ORDER — SPIRONOLACTONE 25 MG/1
12.5 TABLET ORAL DAILY
COMMUNITY
Start: 2024-02-28

## 2025-07-18 RX ORDER — PANTOPRAZOLE SODIUM 40 MG/1
40 TABLET, DELAYED RELEASE ORAL
Qty: 30 TABLET | Refills: 3 | Status: SHIPPED | OUTPATIENT
Start: 2025-07-18

## 2025-07-18 NOTE — PROGRESS NOTES
Subjective   Patient ID: Fredrick De La Paz is a 74 y.o. male who presents for transition of care.    AMW: 3/19/25; see chart     Assessment/Plan   Preventative Medicine  -UTD on vaccines. Flu shot UTD   -Had colonoscopy done recently per patient. Results to be brought  -PSA checked by previous PCP. States that it was normal but has a hx of BPH.   -PHQ2: 0  -Alcohol screening done  -ACP discussed (>16 minutes): Ex-Wife Corinne is HCA. Code status discussed would like to remain full code.       REBECCA   -Was recently hospitalized at [facility] from 7/10-7/15 for CHF exacerbation, acute hypoxic respiratory failure/ pulmonary edema, and acute on chronic renal failure.  PLAN  -Patient is still not taking medication appropriately. He has mild BLLE edema, but no JVD or respiratory crackles.   -Advised to resume GDMT  -Farxiga 10mg Po daily  -Metoprolol 100mg PO daily  -Aldactone 12.5mg PO daily   -Losartan 25mg PO daily  -Advised to take torsemide at 40mg PO daily.   -Currently patient is not taking farxiga, losartan and is only taking torsemide at 20mg PO daily.   -We discussed these medications extensively at [facility] and they are updated on his [facility] discharge medication list.     Hyperkalemia   -Resolved at [facility].   -Currently on ARB and aldactone  -Avoid high potassium foods.   -Will discuss with nephrology and cardiology   -Blood work to recheck potassium in one week      CHF/HFrEF (EF 25%)  CAD  -Echo on 3/27/24 shows EF 25%  -Was prescribed GDMT but was unable to afford Entresto.  Continues to take [id number] mg p.o. daily  -Has frequent exacerbations  -Had cardiac cath  in March 2024.  No intervention done at that  PLAN  -Losartan continued by nephrology  -continue isosorbide dinitrate BID, hydralazine 10mg BID, metoprolol, torsemide.   - Follow up arranged with cardiology on 07/22  - Low salt diet    Pulmonary Nodule  -Seen in CT chest from March 2024  -Repeat this year (order given). Has not done yet. -Still pending        VALERIE  -Hx of previous remote stenting and balloon angioplasty  -Continue to monitor renal function  -Follows with Dr Casillas (vascular surgery)     T2DM  -A1c 8.5% in June 2025  -Current insulin regimen is Basaglar 15U and sliding scale humalog.   -Follows with endocrine. Unknown when next appointment is  PLAN  -Continue basalgar 17U BID   -Continue humalog SSI  -Low carb diet advised. Currently eats rice/lentils for breakfast/lunch. Eats traditional [place] food for dinner consisting of bread and vegetables.  -Has CGM. Log reviewed. Seems like sugars are staying elevated into the 200s overnight. Will change basaglar to at bedtime dosing   -Continue follow up with Endocrine (Dr Garvey). Has been missing multiple appointments with endocrine.      CKD-III  -Continue ARB  -Getting Farxiga from [facility], due to cost concerns  -Avoid nephrotoxic medications   -Continue follow up with nephrology     Insomnia   -Continue ambien 10mg - refill given  -OARRS reviewed.     HTN  - 172/82, recheck 154/84  -Advised medication compliance and diet control  -Continue losartan, isordil, metoprolol, hydralazine, Norvasc, aldactone  -Low salt diet      Blood work orders given to be done in one week; UACR and BMP    OARRS Report Reviewed and appropriate.  UDS reviewed.  I have personally reviewed the OARRS report.  I have considered the risks of abuse, dependence, addiction and diversion. I believe that it is clinically appropriate for this patient to be prescribed this medication based on documented diagnosis.     Controlled Substance Agreement:   I have printed this form and reviewed each line item with the patient and the patient has verbalized understanding.   Date of the last Controlled Substance Agreement: 2/27/24  Date of UDS To be obtained    I discussed patient's OARRS report as well as the potential concern for overdose on prescribed opioid, sleep aid, gabapentin/Lyrica, and/or benzodiazepines. I explained that Overdose Risk  Scores >460, require a Narcan prescription and places the patient at risk for potential death.      Patient understands that the risk of death can occur when using opioid, benzodiazepines, gabapentin, Lyrica, sleep aids, and illegal drugs. The risk of death especially increases when using the above medications and or illegal drugs in combination. I have reviewed with the patient and the patient is in agreement with the terms of the  Controlled Substance Agreement.      At this point in time, patient is in compliance with the above, and has no signs of dependence or addiction to the above prescribed medications.      HPI    74 male presents for follow-up.   Was recently hospitalized at [facility] from 7/10-7/15 for CHF exacerbation, acute hypoxic respiratory failure/ pulmonary edema, and acute on chronic renal failure. Pt reports he is taking a half dose (one tablet) of torsemide, stopped losartan and is not sure what dose of spironolactone to take, half tablet or one. Following up with cardiology with 7/22/25.  Has mild swelling (+1) in legs, shortness of breath when walks similar to before hospitalization. Feels like he is breathing better. Denies PND.    BP is elevated 172/82, recheck 154/84. Pt currently not taking losartan or spironolactone at advised dosage. Only taking Norvasc, metoprolol, and hydralazine as prescribed. Pt was counseled that this is the reason why blood pressure control is sub-optimal and was advised to take medications as prescribed; including losartan 25 and aldactone 25. Will continue to monitor blood pressure.    Pt has medication bag with him in the office. Currently taking insulin glargine, torsemide 20 bid, amlodipine 10, metoprolol 50, allopurinol 100, asa 81, hydralazine 10, clopidogrel 75, pantoprazole 40, atorvastatin 20, albuterol.    Differences in the discharge meds and currently what pt is taking everyday is pt is currently NOT taking trazodone, isordil, losartan, and is only  taking torsemide once when should be taking medication twice daily. Pt had confusion of the spironolactone and if he should be taking a half tablet of this medication. Clarified with pt that there are no changes to his medication list from discharge: should be taking trazodone 50, isordil 10, losartan 25, torsemide 40, farxiga, aldactone 25. Pt currently get Farxiga from [facility] due to cost concerns but is taking this medication as well. Blood work to recheck potassium in 1 week. Previously has had persistent hyperkalemia with stable CKD.      Plan of care discussed, concerns addressed    ROS otherwise negative    Medication refills given to patient for zolpidem and pantoprazole.    Denies fevers, chills, weight loss, lightheadedness, dizziness, vision changes, sore throat, runny nose, CP, palpitations, n/v/d, abd pain, black/bloody stools, arthralgias, mood disturbance, or new numbness/weakness/tingling in arms/legs/face.      PMH: CKD, HFrEF, CAD, Insomnia, HTN, T2DM, VALERIE  Surgeries: PCI, renal artery stenting, PAD with stenting.     Social hx: Non smoker, denies Etoh drinking, no drugs, he med school lecturer, and is still working online part time    Visit Vitals  Smoking Status Never     PHYSICAL EXAM     Physical Exam     Visit Vitals  Smoking Status Never        General: NAD. NCAT. Aox3   HEENT: PERRLA. EOMI. MMM. Nares patent bl.  Cardiovascular: RRR. S1/S2 wnl. No JVD.   Respiratory: CTABL. No crackles/rales. No respiratory distress.   GI: Soft, NT abdomen.   MSK: ROM x 4. CTLS non-tender  Extremities: Trace to 1+ BLLE edema.   Skin: No rashes or bruises.   Neuro: Aox3. Cranial Nerves grossly intact. Motor/sensory wnl.   Psych: Mood wnl.       REVIEW OF SYSTEMS     ROS in HPI   No Known Allergies    Current Outpatient Medications   Medication Sig Dispense Refill    albuterol (ProAir HFA) 90 mcg/actuation inhaler Inhale 2 puffs every 4 hours if needed for wheezing or shortness of breath. 8.5 g 5    allopurinol  (Zyloprim) 100 mg tablet Take 1 tablet (100 mg) by mouth once daily. (Patient taking differently: Take 1 tablet (100 mg) by mouth 2 times a day.) 90 tablet 3    amLODIPine (Norvasc) 10 mg tablet Take 1 tablet (10 mg) by mouth once daily. 30 tablet 3    aspirin 81 mg EC tablet Take 1 tablet (81 mg) by mouth once daily.      atorvastatin (Lipitor) 40 mg tablet Take 1 tablet (40 mg) by mouth once daily. 90 tablet 3    Basaglar KwikPen U-100 Insulin 100 unit/mL (3 mL) pen Inject 20 Units under the skin once daily at bedtime.      clopidogrel (Plavix) 75 mg tablet Take 1 tablet (75 mg) by mouth once daily. 30 tablet 3    diclofenac sodium (Voltaren) 1 % gel Apply 4.5 inches (4 g) topically 4 times a day. 100 g 1    hydrALAZINE (Apresoline) 10 mg tablet Take 1 tablet (10 mg) by mouth 2 times a day.      hydrOXYzine HCL (Atarax) 50 mg tablet Take 1 tablet (50 mg) by mouth if needed.      insulin lispro (HumaLOG) 100 unit/mL pen Inject 17 Units under the skin 2 times a day. Take as directed per insulin instructions 10 mL 3    isosorbide dinitrate (Isordil) 10 mg tablet Take 1 tablet (10 mg) by mouth 2 times a day.      losartan (Cozaar) 25 mg tablet Take 1 tablet (25 mg) by mouth once daily.      metoprolol succinate XL (Toprol-XL) 100 mg 24 hr tablet Take 1 tablet (100 mg) by mouth once daily.      multivitamin tablet Take 1 tablet by mouth once daily.      pantoprazole (ProtoNix) 40 mg EC tablet Take 1 tablet (40 mg) by mouth once daily in the morning. Take before meals. 30 tablet 3    suvorexant (Belsomra) 10 mg tablet Take 1 tablet (10 mg) by mouth as needed at bedtime for sleep for up to 10 days. 10 tablet 0    torsemide (Demadex) 20 mg tablet Take 2 tablets (40 mg) by mouth once daily. 90 tablet 1    traZODone (Desyrel) 50 mg tablet Take 1 tablet (50 mg) by mouth once daily at bedtime. 30 tablet 0    zolpidem (Ambien) 10 mg tablet Take 1 tablet (10 mg) by mouth once daily at bedtime. 30 tablet 1     No current  facility-administered medications for this visit.       Objective     Orders Only on 06/24/2025   Component Date Value Ref Range Status    [facility] BUN 06/24/2025 61 (H)  9 - 23 mg/dL Final    [facility] Calcium 06/24/2025 9.2  8.7 - 10.4 mg/dL Final    [facility] Glucose 06/24/2025 193 (H)  74 - 106 mg/dL Final    [facility] Glomerular Filtration R* 06/24/2025 20  mL/min/1.73m? Final    [facility] Chloride 06/24/2025 103  98 - 107 mmol/L Final    [facility] Na 06/24/2025 138  135 - 145 mmol/L Final    [facility] BUN/Creat Ratio 06/24/2025 19.7   Final    [facility] Creatinine 06/24/2025 3.1 (H)  0.6 - 1.1 mg/dL Final    [facility] GFR AA 06/24/2025 24   Final    [facility] CO2, venous 06/24/2025 25.0  20.0 - 31.0 mmol/L Final    [facility] K 06/24/2025 4.4  3.5 - 5.1 mmol/L Final    [facility] Calculated Osmolality 06/24/2025 298 (H)  275 - 295 mOsm/kg Final   Orders Only on 06/17/2025   Component Date Value Ref Range Status    [facility] MCH 06/17/2025 29.9  27.0 - 34.0 pg Final    [facility] HCT 06/17/2025 41.0  41.0 - 52.0 % Final    [facility] RBC 06/17/2025 4.48 (L)  4.70 - 6.10 x10 Final    [facility] Nucleated RBC 06/17/2025 0  /100WBC Final    [facility] Instr WBC 06/17/2025 5.7   Final    [facility] MCHC 06/17/2025 32.7  32.0 - 37.0 g/dL Final    [facility] Platelet 06/17/2025 176  150 - 450 x10 Final    [facility] MCV 06/17/2025 91.6  80.0 - 100.0 fL Final    [facility] HGB 06/17/2025 13.4 (L)  13.5 - 17.5 g/dL Final    [facility] WBC 06/17/2025 5.7  4.5 - 11.0 x10 Final    [facility] RDW 06/17/2025 15.1 (H)  11.5 - 14.5 % Final    [facility] MPV 06/17/2025 8.7  7.4 - 10.4 fL Final    [facility] DIFF? 06/17/2025 No^NO   Final    [facility] Lymph Count 06/17/2025 1.32  1.20 - 4.80 x1000 Final    [facility] Basos % 06/17/2025 1.0  % Final    [facility] Baso Count 06/17/2025 0.06  0.00 - 0.20 x1000 Final    [facility] Mono Count 06/17/2025 0.57  0.10 - 1.00 x1000 Final    [facility] Mono % 06/17/2025 10.0   % Final    [facility] Neutrophil Count (ANC) 06/17/2025 3.66  1.40 - 8.80 x1000 Final    [facility] Neutrophil % 06/17/2025 63.8  % Final    [facility] Eos Count 06/17/2025 0.13  0.00 - 0.50 x1000 Final    [facility] Eosin % 06/17/2025 2.3  % Final    [facility] Lymph % 06/17/2025 23.0  % Final    [facility] Glucose 06/17/2025 229 (H)  74 - 106 mg/dL Final    [facility] Glomerular Filtration R* 06/17/2025 18  mL/min/1.73m? Final    [facility] Chloride 06/17/2025 103  98 - 107 mmol/L Final    [facility] Na 06/17/2025 136  135 - 145 mmol/L Final    [facility] BUN/Creat Ratio 06/17/2025 20.9   Final    [facility] Creatinine 06/17/2025 3.3 (H)  0.6 - 1.1 mg/dL Final    [facility] GFR AA 06/17/2025 22   Final    [facility] CO2, venous 06/17/2025 24.0  20.0 - 31.0 mmol/L Final    [facility] K 06/17/2025 5.5 (H)  3.5 - 5.1 mmol/L Final    [facility] Calculated Osmolality 06/17/2025 300 (H)  275 - 295 mOsm/kg Final    [facility] BUN 06/17/2025 69 (H)  9 - 23 mg/dL Final    [facility] Calcium 06/17/2025 9.7  8.7 - 10.4 mg/dL Final    [facility] HGB A1C 06/17/2025 8.5  % Final    [facility] A/G Ratio 06/17/2025 1.1   Final    [facility] Na 06/17/2025 136  135 - 145 mmol/L Final    [facility] BUN/Creat Ratio 06/17/2025 21.2   Final    [facility] Alk Phos 06/17/2025 85  45 - 117 unit/L Final    [facility] GPT 06/17/2025 10  10 - 49 unit/L Final    [facility] Creatinine 06/17/2025 3.3 (H)  0.6 - 1.1 mg/dL Final    [facility] CO2, venous 06/17/2025 24.0  20.0 - 31.0 mmol/L Final    [facility] Total Protein 06/17/2025 8.1  5.7 - 8.2 g/dL Final    [facility] Glomerular Filtration R* 06/17/2025 18  mL/min/1.73m? Final    [facility] Calculated Osmolality 06/17/2025 300 (H)  275 - 295 mOsm/kg Final    [facility] K 06/17/2025 5.5 (H)  3.5 - 5.1 mmol/L Final    [facility] Globulin 06/17/2025 4.0  g/dL Final    [facility] BUN 06/17/2025 70 (H)  9 - 23 mg/dL Final    [facility] Calcium 06/17/2025 9.6  8.7 - 10.4 mg/dL Final    [facility]  ALB 06/17/2025 4.2  3.4 - 5.0 g/dL Final    [facility] Glucose 06/17/2025 228 (H)  74 - 106 mg/dL Final    [facility] GOT 06/17/2025 15  15 - 37 unit/L Final    [facility] GFR AA 06/17/2025 22   Final    [facility] Bilirubin, Total 06/17/2025 0.50  0.30 - 1.20 mg/dL Final    [facility] Chloride 06/17/2025 103  98 - 107 mmol/L Final    [facility] TSH 06/17/2025 2.10  0.55 - 4.78 uIU/ml Final    [facility] GFR AA 06/17/2025 23   Final    [facility] Glucose 06/17/2025 228 (H)  74 - 106 mg/dL Final    [facility] ALB 06/17/2025 4.1  3.4 - 5.0 g/dL Final    [facility] Creatinine 06/17/2025 3.2 (H)  0.6 - 1.1 mg/dL Final    [facility] GFR Estimated 06/17/2025 19   Final    [facility] Glomerular Filtration R* 06/17/2025 19  mL/min/1.73m? Final    [facility] CO2, venous 06/17/2025 24.0  20.0 - 31.0 mmol/L Final    [facility] Corrected Calcium 06/17/2025 NCAL  8.5 - 10.5 mg/dL Final    [facility] Calculated Osmolality 06/17/2025 301 (H)  275 - 295 mOsm/kg Final    [facility] Chloride 06/17/2025 105  98 - 107 mmol/L Final    [facility] Calcium 06/17/2025 9.6  8.7 - 10.4 mg/dL Final    [facility] K 06/17/2025 5.5 (H)  3.5 - 5.1 mmol/L Final    [facility] Phosphorus 06/17/2025 4.1  2.0 - 5.1 mg/dL Final    [facility] Na 06/17/2025 137  135 - 145 mmol/L Final    [facility] BUN 06/17/2025 67 (H)  9 - 23 mg/dL Final    [facility] BUN/Creat Ratio 06/17/2025 20.9   Final    [facility] Microalbumin/Creatinine* 06/17/2025 1,212 (H)  0 - 30 mg MALB/gm CREAT Final    [facility] Creatinine, Urine mg/dl 06/17/2025 59.1  mg/dL Final    [facility] Microalbumin, Urine mg/L 06/17/2025 716.0  mg/L Final   Office Visit on 03/19/2025   Component Date Value Ref Range Status    POC HEMOGLOBIN A1c 03/19/2025 8.3 (A)  4.2 - 6.5 % Final    GLUCOSE 03/19/2025 117  65 - 139 mg/dL Final    UREA NITROGEN (BUN) 03/19/2025 49 (H)  7 - 25 mg/dL Final    CREATININE 03/19/2025 2.38 (H)  0.70 - 1.28 mg/dL Final    EGFR 03/19/2025 28 (L)  > OR = 60 mL/min/1.73m2  Final    BUN/CREATININE RATIO 03/19/2025 21  6 - 22 (calc) Final    SODIUM 03/19/2025 138  135 - 146 mmol/L Final    POTASSIUM 03/19/2025 5.3  3.5 - 5.3 mmol/L Final    CHLORIDE 03/19/2025 105  98 - 110 mmol/L Final    CARBON DIOXIDE 03/19/2025 21  20 - 32 mmol/L Final    ELECTROLYTE BALANCE 03/19/2025 12  7 - 17 mmol/L (calc) Final    CALCIUM 03/19/2025 9.5  8.6 - 10.3 mg/dL Final    CHOLESTEROL, TOTAL 03/19/2025 178  <200 mg/dL Final    HDL CHOLESTEROL 03/19/2025 51  > OR = 40 mg/dL Final    TRIGLYCERIDES 03/19/2025 177 (H)  <150 mg/dL Final    LDL-CHOLESTEROL 03/19/2025 100 (H)  mg/dL (calc) Final    CHOL/HDLC RATIO 03/19/2025 3.5  <5.0 (calc) Final    NON HDL CHOLESTEROL 03/19/2025 127  <130 mg/dL (calc) Final    TSH W/REFLEX TO FT4 03/19/2025 2.50  0.40 - 4.50 mIU/L Final    Amphetamines 03/19/2025 NEGATIVE  <500 ng/mL Final    Barbiturates 03/19/2025 NEGATIVE  <300 ng/mL Final    Benzodiazepines 03/19/2025 NEGATIVE  <100 ng/mL Final    Cocaine Metabolite 03/19/2025 NEGATIVE  <150 ng/mL Final    Marijuana Metabolite 03/19/2025 NEGATIVE  <20 ng/mL Final    Methadone Metabolite 03/19/2025 NEGATIVE  <100 ng/mL Final    Opiates 03/19/2025 NEGATIVE  <100 ng/mL Final    Oxycodone 03/19/2025 NEGATIVE  <100 ng/mL Final    Phencyclidine 03/19/2025 NEGATIVE  <25 ng/mL Final    Creatinine 03/19/2025 17.0 (L)  > or = 20.0 mg/dL Final    Specific Gravity 03/19/2025 1.007  > or = 1.003 Final    pH 03/19/2025 6.2  4.5 - 9.0 Final    Oxidant 03/19/2025 NEGATIVE  <200 mcg/mL Final    Notes and Comments 03/19/2025    Final       Radiology: Reviewed imaging in powerchart.  No results found.    No family history on file.  Social History     Socioeconomic History    Marital status:    Tobacco Use    Smoking status: Never    Smokeless tobacco: Never   Substance and Sexual Activity    Alcohol use: Not Currently     Social Drivers of Health     Financial Resource Strain: Low Risk  (3/27/2024)    Overall Financial Resource  Strain (CARDIA)     Difficulty of Paying Living Expenses: Not hard at all   Food Insecurity: No Food Insecurity (12/4/2023)    Received from [facility]    Hunger Vital Sign     Within the past 12 months, you worried that your food would run out before you got the money to buy more.: Never true     Within the past 12 months, the food you bought just didn't last and you didn't have money to get more.: Never true   Transportation Needs: No Transportation Needs (3/27/2024)    PRAPARE - Transportation     Lack of Transportation (Medical): No     Lack of Transportation (Non-Medical): No   Physical Activity: Not on File (2/26/2021)    Received from [facility]    Physical Activity     Physical Activity: 0   Stress: Not on File (2/26/2021)    Received from [facility]    Stress     Stress: 0   Social Connections: Not on File (2/26/2021)    Received from [facility]    Social Connections     Social Connections and Isolation: 0   Intimate Partner Violence: Not At Risk (12/4/2023)    Received from [facility]    Humiliation, Afraid, Rape, and Kick questionnaire     Within the last year, have you been afraid of your partner or ex-partner?: No     Within the last year, have you been humiliated or emotionally abused in other ways by your partner or ex-partner?: No     Within the last year, have you been kicked, hit, slapped, or otherwise physically hurt by your partner or ex-partner?: No     Within the last year, have you been raped or forced to have any kind of sexual activity by your partner or ex-partner?: No   Housing Stability: Low Risk  (3/27/2024)    Housing Stability Vital Sign     Unable to Pay for Housing in the Last Year: No     Number of Places Lived in the Last Year: 1     Unstable Housing in the Last Year: No     Past Medical History:   Diagnosis Date    Acute cystitis with hematuria 12/31/2016    Acute cystitis with hematuria    Personal history of other diseases of the circulatory system     History of hypertension    Personal  history of other diseases of the circulatory system     History of cardiac disorder    Personal history of other endocrine, nutritional and metabolic disease     History of diabetic neuropathy    Personal history of other endocrine, nutritional and metabolic disease     History of hyperlipidemia    Personal history of other endocrine, nutritional and metabolic disease     History of type 2 diabetes mellitus    Personal history of other specified conditions     History of insomnia     Past Surgical History:   Procedure Laterality Date    OTHER SURGICAL HISTORY  09/17/2019    Bypass    OTHER SURGICAL HISTORY  09/17/2019    Arterial stent placement       Charting was completed using voice recognition technology and may include unintended errors.

## 2025-08-06 ENCOUNTER — PATIENT OUTREACH (OUTPATIENT)
Dept: PRIMARY CARE | Facility: CLINIC | Age: 75
End: 2025-08-06
Payer: MEDICARE

## 2025-08-06 NOTE — PROGRESS NOTES
Discharge Facility: [facility]  Discharge Diagnosis: Acute CHF  Admission Date: 8/2/25  Discharge Date: 8/5/25    PCP Appointment Date: 8/7/25  Specialist Appointment Date: Unknown  Hospital Encounter and Summary Linked: Yes    Unknown      Pt has an appointment within 2 days of discharge    Jim Aponte LPN

## 2025-08-07 ENCOUNTER — APPOINTMENT (OUTPATIENT)
Dept: PRIMARY CARE | Facility: CLINIC | Age: 75
End: 2025-08-07
Payer: MEDICARE

## 2025-09-25 ENCOUNTER — APPOINTMENT (OUTPATIENT)
Dept: PRIMARY CARE | Facility: CLINIC | Age: 75
End: 2025-09-25
Payer: MEDICARE

## 2025-10-21 ENCOUNTER — APPOINTMENT (OUTPATIENT)
Dept: PRIMARY CARE | Facility: CLINIC | Age: 75
End: 2025-10-21
Payer: MEDICARE